# Patient Record
Sex: FEMALE | Race: WHITE | NOT HISPANIC OR LATINO | Employment: FULL TIME | RURAL
[De-identification: names, ages, dates, MRNs, and addresses within clinical notes are randomized per-mention and may not be internally consistent; named-entity substitution may affect disease eponyms.]

---

## 2020-06-15 ENCOUNTER — HISTORICAL (OUTPATIENT)
Dept: ADMINISTRATIVE | Facility: HOSPITAL | Age: 45
End: 2020-06-15

## 2020-06-18 LAB
LAB AP CLINICAL INFORMATION: NORMAL
LAB AP GENERAL CAT - HISTORICAL: NORMAL
LAB AP INTERPRETATION/RESULT - HISTORICAL: NEGATIVE
LAB AP SPECIMEN ADEQUACY - HISTORICAL: NORMAL
LAB AP SPECIMEN SUBMITTED - HISTORICAL: NORMAL

## 2020-11-20 ENCOUNTER — HISTORICAL (OUTPATIENT)
Dept: ADMINISTRATIVE | Facility: HOSPITAL | Age: 45
End: 2020-11-20

## 2021-03-14 VITALS — BODY MASS INDEX: 45.54 KG/M2 | WEIGHT: 257 LBS | HEIGHT: 63 IN

## 2021-03-14 RX ORDER — ATORVASTATIN CALCIUM 20 MG/1
20 TABLET, FILM COATED ORAL DAILY
COMMUNITY
End: 2021-11-05 | Stop reason: SDUPTHER

## 2021-03-14 RX ORDER — OMEPRAZOLE 40 MG/1
40 CAPSULE, DELAYED RELEASE ORAL DAILY
COMMUNITY
End: 2021-10-25

## 2021-03-14 RX ORDER — PAROXETINE HYDROCHLORIDE 20 MG/1
20 TABLET, FILM COATED ORAL NIGHTLY
COMMUNITY
End: 2022-05-11 | Stop reason: SDUPTHER

## 2021-03-14 RX ORDER — HYDROCODONE BITARTRATE AND ACETAMINOPHEN 10; 325 MG/1; MG/1
1 TABLET ORAL 2 TIMES DAILY
COMMUNITY

## 2021-03-14 RX ORDER — ALBUTEROL SULFATE 90 UG/1
2 AEROSOL, METERED RESPIRATORY (INHALATION) EVERY 6 HOURS PRN
COMMUNITY
End: 2021-04-05 | Stop reason: SDUPTHER

## 2021-03-14 RX ORDER — SUCRALFATE 1 G/1
1 TABLET ORAL 2 TIMES DAILY
COMMUNITY
End: 2022-08-05 | Stop reason: SDUPTHER

## 2021-03-14 RX ORDER — CYCLOBENZAPRINE HCL 10 MG
10 TABLET ORAL 3 TIMES DAILY PRN
COMMUNITY

## 2021-03-14 RX ORDER — MINERAL OIL
180 ENEMA (ML) RECTAL DAILY
COMMUNITY
End: 2022-11-14 | Stop reason: SDUPTHER

## 2021-03-14 RX ORDER — BUDESONIDE AND FORMOTEROL FUMARATE DIHYDRATE 160; 4.5 UG/1; UG/1
2 AEROSOL RESPIRATORY (INHALATION) EVERY 12 HOURS
COMMUNITY
End: 2023-04-06 | Stop reason: SDUPTHER

## 2021-03-14 RX ORDER — ONDANSETRON 4 MG/1
8 TABLET, FILM COATED ORAL 3 TIMES DAILY PRN
COMMUNITY
End: 2022-01-28 | Stop reason: ALTCHOICE

## 2021-03-14 RX ORDER — LISINOPRIL 5 MG/1
20 TABLET ORAL DAILY
COMMUNITY
End: 2021-10-11

## 2021-03-14 RX ORDER — POTASSIUM CHLORIDE 750 MG/1
20 CAPSULE, EXTENDED RELEASE ORAL 2 TIMES DAILY
COMMUNITY
End: 2023-03-03

## 2021-03-14 RX ORDER — HYDROCHLOROTHIAZIDE 25 MG/1
12.5 TABLET ORAL DAILY
COMMUNITY
End: 2021-09-10

## 2021-04-05 ENCOUNTER — OFFICE VISIT (OUTPATIENT)
Dept: PULMONOLOGY | Facility: CLINIC | Age: 46
End: 2021-04-05
Payer: COMMERCIAL

## 2021-04-05 VITALS
HEART RATE: 84 BPM | DIASTOLIC BLOOD PRESSURE: 90 MMHG | RESPIRATION RATE: 24 BRPM | OXYGEN SATURATION: 95 % | SYSTOLIC BLOOD PRESSURE: 120 MMHG | WEIGHT: 257 LBS | HEIGHT: 63 IN | BODY MASS INDEX: 45.54 KG/M2

## 2021-04-05 DIAGNOSIS — J45.30 MILD PERSISTENT ASTHMA WITHOUT COMPLICATION: ICD-10-CM

## 2021-04-05 DIAGNOSIS — G47.33 OSA (OBSTRUCTIVE SLEEP APNEA): ICD-10-CM

## 2021-04-05 DIAGNOSIS — E66.09 OBESITY DUE TO EXCESS CALORIES WITH SERIOUS COMORBIDITY, UNSPECIFIED CLASSIFICATION: ICD-10-CM

## 2021-04-05 PROCEDURE — 99215 OFFICE O/P EST HI 40 MIN: CPT | Mod: PBBFAC | Performed by: INTERNAL MEDICINE

## 2021-04-05 PROCEDURE — 99213 OFFICE O/P EST LOW 20 MIN: CPT | Mod: S$PBB,,, | Performed by: INTERNAL MEDICINE

## 2021-04-05 PROCEDURE — 99999 PR PBB SHADOW E&M-EST. PATIENT-LVL V: ICD-10-PCS | Mod: PBBFAC,,, | Performed by: INTERNAL MEDICINE

## 2021-04-05 PROCEDURE — 99213 PR OFFICE/OUTPT VISIT, EST, LEVL III, 20-29 MIN: ICD-10-PCS | Mod: S$PBB,,, | Performed by: INTERNAL MEDICINE

## 2021-04-05 PROCEDURE — 99999 PR PBB SHADOW E&M-EST. PATIENT-LVL V: CPT | Mod: PBBFAC,,, | Performed by: INTERNAL MEDICINE

## 2021-04-05 RX ORDER — ALBUTEROL SULFATE 90 UG/1
2 AEROSOL, METERED RESPIRATORY (INHALATION) EVERY 6 HOURS PRN
Qty: 18 G | Refills: 5 | Status: SHIPPED | OUTPATIENT
Start: 2021-04-05 | End: 2023-04-06 | Stop reason: SDUPTHER

## 2021-04-05 RX ORDER — FAMOTIDINE 40 MG/1
40 TABLET, FILM COATED ORAL 2 TIMES DAILY
COMMUNITY
End: 2023-03-08 | Stop reason: ALTCHOICE

## 2021-07-13 ENCOUNTER — OFFICE VISIT (OUTPATIENT)
Dept: FAMILY MEDICINE | Facility: CLINIC | Age: 46
End: 2021-07-13
Payer: COMMERCIAL

## 2021-07-13 VITALS
RESPIRATION RATE: 18 BRPM | WEIGHT: 261 LBS | BODY MASS INDEX: 46.25 KG/M2 | DIASTOLIC BLOOD PRESSURE: 71 MMHG | HEIGHT: 63 IN | HEART RATE: 81 BPM | OXYGEN SATURATION: 95 % | SYSTOLIC BLOOD PRESSURE: 139 MMHG

## 2021-07-13 DIAGNOSIS — B07.0 PLANTAR WART OF RIGHT FOOT: ICD-10-CM

## 2021-07-13 DIAGNOSIS — G47.33 OSA (OBSTRUCTIVE SLEEP APNEA): ICD-10-CM

## 2021-07-13 DIAGNOSIS — I10 ESSENTIAL HYPERTENSION: ICD-10-CM

## 2021-07-13 DIAGNOSIS — R60.0 BILATERAL LOWER EXTREMITY EDEMA: Primary | ICD-10-CM

## 2021-07-13 DIAGNOSIS — K21.9 GASTROESOPHAGEAL REFLUX DISEASE WITHOUT ESOPHAGITIS: ICD-10-CM

## 2021-07-13 DIAGNOSIS — J45.30 MILD PERSISTENT ASTHMA WITHOUT COMPLICATION: ICD-10-CM

## 2021-07-13 DIAGNOSIS — R73.03 PRE-DIABETES: ICD-10-CM

## 2021-07-13 DIAGNOSIS — E66.09 OBESITY DUE TO EXCESS CALORIES WITH SERIOUS COMORBIDITY, UNSPECIFIED CLASSIFICATION: ICD-10-CM

## 2021-07-13 LAB
ALBUMIN SERPL BCP-MCNC: 3.5 G/DL (ref 3.5–5)
ALBUMIN/GLOB SERPL: 0.9 {RATIO}
ALP SERPL-CCNC: 131 U/L (ref 37–98)
ALT SERPL W P-5'-P-CCNC: 53 U/L (ref 13–56)
ANION GAP SERPL CALCULATED.3IONS-SCNC: 12 MMOL/L (ref 7–16)
AST SERPL W P-5'-P-CCNC: 49 U/L (ref 15–37)
BASOPHILS # BLD AUTO: 0.1 K/UL (ref 0–0.2)
BASOPHILS NFR BLD AUTO: 1.2 % (ref 0–1)
BILIRUB SERPL-MCNC: 0.4 MG/DL (ref 0–1.2)
BUN SERPL-MCNC: 11 MG/DL (ref 7–18)
BUN/CREAT SERPL: 13 (ref 6–20)
CALCIUM SERPL-MCNC: 9.4 MG/DL (ref 8.5–10.1)
CHLORIDE SERPL-SCNC: 102 MMOL/L (ref 98–107)
CHOLEST SERPL-MCNC: 138 MG/DL (ref 0–200)
CHOLEST/HDLC SERPL: 2.3 {RATIO}
CO2 SERPL-SCNC: 30 MMOL/L (ref 21–32)
CREAT SERPL-MCNC: 0.87 MG/DL (ref 0.55–1.02)
CREAT UR-MCNC: 289 MG/DL (ref 28–219)
DIFFERENTIAL METHOD BLD: ABNORMAL
EOSINOPHIL # BLD AUTO: 0.23 K/UL (ref 0–0.5)
EOSINOPHIL NFR BLD AUTO: 2.8 % (ref 1–4)
ERYTHROCYTE [DISTWIDTH] IN BLOOD BY AUTOMATED COUNT: 13 % (ref 11.5–14.5)
EST. AVERAGE GLUCOSE BLD GHB EST-MCNC: 84 MG/DL
GLOBULIN SER-MCNC: 4 G/DL (ref 2–4)
GLUCOSE SERPL-MCNC: 112 MG/DL (ref 74–106)
HBA1C MFR BLD HPLC: 5.1 % (ref 4.5–6.6)
HCT VFR BLD AUTO: 44.3 % (ref 38–47)
HDLC SERPL-MCNC: 60 MG/DL (ref 40–60)
HGB BLD-MCNC: 15.2 G/DL (ref 12–16)
IMM GRANULOCYTES # BLD AUTO: 0.02 K/UL (ref 0–0.04)
IMM GRANULOCYTES NFR BLD: 0.2 % (ref 0–0.4)
LDLC SERPL CALC-MCNC: 55 MG/DL
LDLC/HDLC SERPL: 0.9 {RATIO}
LYMPHOCYTES # BLD AUTO: 2.46 K/UL (ref 1–4.8)
LYMPHOCYTES NFR BLD AUTO: 30.1 % (ref 27–41)
MCH RBC QN AUTO: 32.1 PG (ref 27–31)
MCHC RBC AUTO-ENTMCNC: 34.3 G/DL (ref 32–36)
MCV RBC AUTO: 93.5 FL (ref 80–96)
MICROALBUMIN UR-MCNC: 4.1 MG/DL (ref 0–2.8)
MICROALBUMIN/CREAT RATIO PNL UR: 14.2 MG/G (ref 0–30)
MONOCYTES # BLD AUTO: 0.86 K/UL (ref 0–0.8)
MONOCYTES NFR BLD AUTO: 10.5 % (ref 2–6)
MPC BLD CALC-MCNC: 12 FL (ref 9.4–12.4)
NEUTROPHILS # BLD AUTO: 4.5 K/UL (ref 1.8–7.7)
NEUTROPHILS NFR BLD AUTO: 55.2 % (ref 53–65)
NONHDLC SERPL-MCNC: 78 MG/DL
NRBC # BLD AUTO: 0 X10E3/UL
NRBC, AUTO (.00): 0 %
PLATELET # BLD AUTO: 193 K/UL (ref 150–400)
POTASSIUM SERPL-SCNC: 3.5 MMOL/L (ref 3.5–5.1)
PROT SERPL-MCNC: 7.5 G/DL (ref 6.4–8.2)
RBC # BLD AUTO: 4.74 M/UL (ref 4.2–5.4)
SODIUM SERPL-SCNC: 140 MMOL/L (ref 136–145)
TRIGL SERPL-MCNC: 114 MG/DL (ref 35–150)
VLDLC SERPL-MCNC: 23 MG/DL
WBC # BLD AUTO: 8.17 K/UL (ref 4.5–11)

## 2021-07-13 PROCEDURE — 82570 ASSAY OF URINE CREATININE: CPT | Mod: ,,, | Performed by: CLINICAL MEDICAL LABORATORY

## 2021-07-13 PROCEDURE — 99214 PR OFFICE/OUTPT VISIT, EST, LEVL IV, 30-39 MIN: ICD-10-PCS | Mod: ,,, | Performed by: NURSE PRACTITIONER

## 2021-07-13 PROCEDURE — 83036 HEMOGLOBIN GLYCOSYLATED A1C: CPT | Mod: ,,, | Performed by: CLINICAL MEDICAL LABORATORY

## 2021-07-13 PROCEDURE — 80053 COMPREHEN METABOLIC PANEL: CPT | Mod: ,,, | Performed by: CLINICAL MEDICAL LABORATORY

## 2021-07-13 PROCEDURE — 80061 LIPID PANEL: CPT | Mod: ,,, | Performed by: CLINICAL MEDICAL LABORATORY

## 2021-07-13 PROCEDURE — 83036 HEMOGLOBIN A1C: ICD-10-PCS | Mod: ,,, | Performed by: CLINICAL MEDICAL LABORATORY

## 2021-07-13 PROCEDURE — 99214 OFFICE O/P EST MOD 30 MIN: CPT | Mod: ,,, | Performed by: NURSE PRACTITIONER

## 2021-07-13 PROCEDURE — 80053 COMPREHENSIVE METABOLIC PANEL: ICD-10-PCS | Mod: ,,, | Performed by: CLINICAL MEDICAL LABORATORY

## 2021-07-13 PROCEDURE — 85025 CBC WITH DIFFERENTIAL: ICD-10-PCS | Mod: ,,, | Performed by: CLINICAL MEDICAL LABORATORY

## 2021-07-13 PROCEDURE — 82043 UR ALBUMIN QUANTITATIVE: CPT | Mod: ,,, | Performed by: CLINICAL MEDICAL LABORATORY

## 2021-07-13 PROCEDURE — 80061 LIPID PANEL: ICD-10-PCS | Mod: ,,, | Performed by: CLINICAL MEDICAL LABORATORY

## 2021-07-13 PROCEDURE — 85025 COMPLETE CBC W/AUTO DIFF WBC: CPT | Mod: ,,, | Performed by: CLINICAL MEDICAL LABORATORY

## 2021-07-13 PROCEDURE — 82570 MICROALBUMIN / CREATININE RATIO URINE: ICD-10-PCS | Mod: ,,, | Performed by: CLINICAL MEDICAL LABORATORY

## 2021-07-13 PROCEDURE — 82043 MICROALBUMIN / CREATININE RATIO URINE: ICD-10-PCS | Mod: ,,, | Performed by: CLINICAL MEDICAL LABORATORY

## 2021-07-14 ENCOUNTER — TELEPHONE (OUTPATIENT)
Dept: FAMILY MEDICINE | Facility: CLINIC | Age: 46
End: 2021-07-14

## 2021-07-26 ENCOUNTER — OFFICE VISIT (OUTPATIENT)
Dept: SLEEP MEDICINE | Facility: CLINIC | Age: 46
End: 2021-07-26
Payer: COMMERCIAL

## 2021-07-26 VITALS
OXYGEN SATURATION: 94 % | BODY MASS INDEX: 47.27 KG/M2 | SYSTOLIC BLOOD PRESSURE: 150 MMHG | HEART RATE: 82 BPM | HEIGHT: 63 IN | WEIGHT: 266.81 LBS | DIASTOLIC BLOOD PRESSURE: 99 MMHG

## 2021-07-26 DIAGNOSIS — E66.09 OBESITY DUE TO EXCESS CALORIES WITH SERIOUS COMORBIDITY, UNSPECIFIED CLASSIFICATION: ICD-10-CM

## 2021-07-26 DIAGNOSIS — I10 ESSENTIAL HYPERTENSION: ICD-10-CM

## 2021-07-26 DIAGNOSIS — G47.33 OSA ON CPAP: Primary | ICD-10-CM

## 2021-07-26 PROCEDURE — 99212 PR OFFICE/OUTPT VISIT, EST, LEVL II, 10-19 MIN: ICD-10-PCS | Mod: S$PBB,,, | Performed by: FAMILY MEDICINE

## 2021-07-26 PROCEDURE — 99214 OFFICE O/P EST MOD 30 MIN: CPT | Mod: PBBFAC | Performed by: FAMILY MEDICINE

## 2021-07-26 PROCEDURE — 99212 OFFICE O/P EST SF 10 MIN: CPT | Mod: S$PBB,,, | Performed by: FAMILY MEDICINE

## 2021-08-17 ENCOUNTER — INFUSION (OUTPATIENT)
Dept: INFECTIOUS DISEASES | Facility: HOSPITAL | Age: 46
End: 2021-08-17
Attending: NURSE PRACTITIONER
Payer: COMMERCIAL

## 2021-08-17 ENCOUNTER — OFFICE VISIT (OUTPATIENT)
Dept: FAMILY MEDICINE | Facility: CLINIC | Age: 46
End: 2021-08-17
Payer: COMMERCIAL

## 2021-08-17 VITALS
OXYGEN SATURATION: 96 % | WEIGHT: 260 LBS | RESPIRATION RATE: 22 BRPM | HEART RATE: 81 BPM | BODY MASS INDEX: 46.07 KG/M2 | TEMPERATURE: 98 F | SYSTOLIC BLOOD PRESSURE: 158 MMHG | HEIGHT: 63 IN | DIASTOLIC BLOOD PRESSURE: 93 MMHG

## 2021-08-17 VITALS
BODY MASS INDEX: 46.07 KG/M2 | SYSTOLIC BLOOD PRESSURE: 138 MMHG | OXYGEN SATURATION: 91 % | WEIGHT: 260 LBS | DIASTOLIC BLOOD PRESSURE: 73 MMHG | RESPIRATION RATE: 18 BRPM | HEIGHT: 63 IN | HEART RATE: 81 BPM

## 2021-08-17 DIAGNOSIS — U07.1 COVID-19: Primary | ICD-10-CM

## 2021-08-17 DIAGNOSIS — Z20.822 ENCOUNTER FOR LABORATORY TESTING FOR COVID-19 VIRUS: ICD-10-CM

## 2021-08-17 LAB
CTP QC/QA: YES
FLUAV AG NPH QL: NEGATIVE
FLUBV AG NPH QL: NEGATIVE
SARS-COV-2 AG RESP QL IA.RAPID: POSITIVE

## 2021-08-17 PROCEDURE — 87428 SARSCOV & INF VIR A&B AG IA: CPT | Mod: QW,,, | Performed by: NURSE PRACTITIONER

## 2021-08-17 PROCEDURE — 87428 POCT SARS-COV2 (COVID) WITH FLU ANTIGEN: ICD-10-PCS | Mod: QW,,, | Performed by: NURSE PRACTITIONER

## 2021-08-17 PROCEDURE — 99214 OFFICE O/P EST MOD 30 MIN: CPT | Mod: ,,, | Performed by: NURSE PRACTITIONER

## 2021-08-17 PROCEDURE — 99214 PR OFFICE/OUTPT VISIT, EST, LEVL IV, 30-39 MIN: ICD-10-PCS | Mod: ,,, | Performed by: NURSE PRACTITIONER

## 2021-08-17 PROCEDURE — 63600175 PHARM REV CODE 636 W HCPCS: Performed by: NURSE PRACTITIONER

## 2021-08-17 PROCEDURE — M0243 CASIRIVI AND IMDEVI INFUSION: HCPCS | Performed by: NURSE PRACTITIONER

## 2021-08-17 PROCEDURE — 25000003 PHARM REV CODE 250: Performed by: NURSE PRACTITIONER

## 2021-08-17 RX ORDER — ONDANSETRON 4 MG/1
4 TABLET, ORALLY DISINTEGRATING ORAL ONCE AS NEEDED
Status: DISCONTINUED | OUTPATIENT
Start: 2021-08-17 | End: 2022-01-28 | Stop reason: ALTCHOICE

## 2021-08-17 RX ORDER — ALBUTEROL SULFATE 90 UG/1
2 AEROSOL, METERED RESPIRATORY (INHALATION)
Status: DISCONTINUED | OUTPATIENT
Start: 2021-08-17 | End: 2022-11-14

## 2021-08-17 RX ORDER — ACETAMINOPHEN 325 MG/1
650 TABLET ORAL ONCE AS NEEDED
Status: DISCONTINUED | OUTPATIENT
Start: 2021-08-17 | End: 2022-01-28 | Stop reason: ALTCHOICE

## 2021-08-17 RX ORDER — SODIUM CHLORIDE 0.9 % (FLUSH) 0.9 %
10 SYRINGE (ML) INJECTION
Status: DISCONTINUED | OUTPATIENT
Start: 2021-08-17 | End: 2023-08-16

## 2021-08-17 RX ORDER — DIPHENHYDRAMINE HYDROCHLORIDE 50 MG/ML
25 INJECTION INTRAMUSCULAR; INTRAVENOUS ONCE AS NEEDED
Status: DISCONTINUED | OUTPATIENT
Start: 2021-08-17 | End: 2022-01-28 | Stop reason: ALTCHOICE

## 2021-08-17 RX ORDER — EPINEPHRINE 0.3 MG/.3ML
0.3 INJECTION SUBCUTANEOUS
Status: DISCONTINUED | OUTPATIENT
Start: 2021-08-17 | End: 2022-01-28 | Stop reason: ALTCHOICE

## 2021-08-17 RX ADMIN — CASIRIVIMAB AND IMDEVIMAB 600 MG: 600; 600 INJECTION, SOLUTION, CONCENTRATE INTRAVENOUS at 11:08

## 2021-09-10 ENCOUNTER — OFFICE VISIT (OUTPATIENT)
Dept: FAMILY MEDICINE | Facility: CLINIC | Age: 46
End: 2021-09-10
Payer: COMMERCIAL

## 2021-09-10 VITALS
HEART RATE: 79 BPM | RESPIRATION RATE: 18 BRPM | DIASTOLIC BLOOD PRESSURE: 62 MMHG | WEIGHT: 266 LBS | BODY MASS INDEX: 47.13 KG/M2 | OXYGEN SATURATION: 98 % | SYSTOLIC BLOOD PRESSURE: 146 MMHG | HEIGHT: 63 IN

## 2021-09-10 DIAGNOSIS — I73.9 PERIPHERAL VASCULAR DISEASE: ICD-10-CM

## 2021-09-10 DIAGNOSIS — I10 ESSENTIAL HYPERTENSION: Primary | ICD-10-CM

## 2021-09-10 PROCEDURE — 99214 PR OFFICE/OUTPT VISIT, EST, LEVL IV, 30-39 MIN: ICD-10-PCS | Mod: ,,, | Performed by: NURSE PRACTITIONER

## 2021-09-10 PROCEDURE — 99214 OFFICE O/P EST MOD 30 MIN: CPT | Mod: ,,, | Performed by: NURSE PRACTITIONER

## 2021-09-13 DIAGNOSIS — R60.0 LOCALIZED EDEMA: Primary | ICD-10-CM

## 2021-09-13 RX ORDER — FUROSEMIDE 20 MG/1
TABLET ORAL
Qty: 60 TABLET | Refills: 11 | Status: SHIPPED | OUTPATIENT
Start: 2021-09-13 | End: 2024-02-27

## 2021-10-11 ENCOUNTER — OFFICE VISIT (OUTPATIENT)
Dept: FAMILY MEDICINE | Facility: CLINIC | Age: 46
End: 2021-10-11
Payer: COMMERCIAL

## 2021-10-11 VITALS
HEART RATE: 90 BPM | RESPIRATION RATE: 18 BRPM | HEIGHT: 63 IN | OXYGEN SATURATION: 98 % | WEIGHT: 264 LBS | DIASTOLIC BLOOD PRESSURE: 88 MMHG | SYSTOLIC BLOOD PRESSURE: 128 MMHG | BODY MASS INDEX: 46.78 KG/M2

## 2021-10-11 DIAGNOSIS — Z23 NEED FOR INFLUENZA VACCINATION: ICD-10-CM

## 2021-10-11 DIAGNOSIS — I10 PRIMARY HYPERTENSION: Primary | ICD-10-CM

## 2021-10-11 DIAGNOSIS — I73.9 PERIPHERAL VASCULAR DISEASE: ICD-10-CM

## 2021-10-11 PROCEDURE — 99213 PR OFFICE/OUTPT VISIT, EST, LEVL III, 20-29 MIN: ICD-10-PCS | Mod: 25,,, | Performed by: NURSE PRACTITIONER

## 2021-10-11 PROCEDURE — 90686 FLU VACCINE (QUAD) GREATER THAN OR EQUAL TO 3YO PRESERVATIVE FREE IM: ICD-10-PCS | Mod: ,,, | Performed by: NURSE PRACTITIONER

## 2021-10-11 PROCEDURE — 90686 IIV4 VACC NO PRSV 0.5 ML IM: CPT | Mod: ,,, | Performed by: NURSE PRACTITIONER

## 2021-10-11 PROCEDURE — 99213 OFFICE O/P EST LOW 20 MIN: CPT | Mod: 25,,, | Performed by: NURSE PRACTITIONER

## 2021-10-11 PROCEDURE — 90471 IMMUNIZATION ADMIN: CPT | Mod: ,,, | Performed by: NURSE PRACTITIONER

## 2021-10-11 PROCEDURE — 90471 FLU VACCINE (QUAD) GREATER THAN OR EQUAL TO 3YO PRESERVATIVE FREE IM: ICD-10-PCS | Mod: ,,, | Performed by: NURSE PRACTITIONER

## 2021-10-11 RX ORDER — LISINOPRIL 20 MG/1
20 TABLET ORAL DAILY
Qty: 90 TABLET | Refills: 3 | Status: SHIPPED | OUTPATIENT
Start: 2021-10-11 | End: 2023-07-12 | Stop reason: SDUPTHER

## 2021-10-11 RX ORDER — LISINOPRIL 5 MG/1
20 TABLET ORAL DAILY
Qty: 30 TABLET | Refills: 2 | Status: CANCELLED | OUTPATIENT
Start: 2021-10-11

## 2021-10-25 ENCOUNTER — OFFICE VISIT (OUTPATIENT)
Dept: SLEEP MEDICINE | Facility: CLINIC | Age: 46
End: 2021-10-25
Attending: FAMILY MEDICINE
Payer: COMMERCIAL

## 2021-10-25 VITALS
SYSTOLIC BLOOD PRESSURE: 159 MMHG | DIASTOLIC BLOOD PRESSURE: 93 MMHG | WEIGHT: 261 LBS | BODY MASS INDEX: 46.25 KG/M2 | OXYGEN SATURATION: 96 % | HEART RATE: 75 BPM | HEIGHT: 63 IN

## 2021-10-25 DIAGNOSIS — G47.33 OSA ON CPAP: Primary | ICD-10-CM

## 2021-10-25 PROCEDURE — 99212 PR OFFICE/OUTPT VISIT, EST, LEVL II, 10-19 MIN: ICD-10-PCS | Mod: S$PBB,,, | Performed by: FAMILY MEDICINE

## 2021-10-25 PROCEDURE — 99212 OFFICE O/P EST SF 10 MIN: CPT | Mod: S$PBB,,, | Performed by: FAMILY MEDICINE

## 2021-10-25 PROCEDURE — 99215 OFFICE O/P EST HI 40 MIN: CPT | Mod: PBBFAC | Performed by: FAMILY MEDICINE

## 2021-11-03 DIAGNOSIS — Z12.31 SCREENING MAMMOGRAM FOR BREAST CANCER: Primary | ICD-10-CM

## 2021-11-05 ENCOUNTER — OFFICE VISIT (OUTPATIENT)
Dept: FAMILY MEDICINE | Facility: CLINIC | Age: 46
End: 2021-11-05
Payer: COMMERCIAL

## 2021-11-05 VITALS
WEIGHT: 259 LBS | SYSTOLIC BLOOD PRESSURE: 145 MMHG | OXYGEN SATURATION: 98 % | HEIGHT: 63 IN | HEART RATE: 81 BPM | BODY MASS INDEX: 45.89 KG/M2 | RESPIRATION RATE: 18 BRPM | DIASTOLIC BLOOD PRESSURE: 81 MMHG

## 2021-11-05 DIAGNOSIS — E66.01 CLASS 3 SEVERE OBESITY DUE TO EXCESS CALORIES WITH SERIOUS COMORBIDITY AND BODY MASS INDEX (BMI) OF 45.0 TO 49.9 IN ADULT: ICD-10-CM

## 2021-11-05 DIAGNOSIS — N76.0 ACUTE VAGINITIS: ICD-10-CM

## 2021-11-05 DIAGNOSIS — G47.33 OSA ON CPAP: ICD-10-CM

## 2021-11-05 DIAGNOSIS — Z13.1 SCREENING FOR DIABETES MELLITUS: ICD-10-CM

## 2021-11-05 DIAGNOSIS — Z12.72 SCREENING FOR VAGINAL CANCER: Primary | ICD-10-CM

## 2021-11-05 DIAGNOSIS — Z13.220 SCREENING FOR LIPOID DISORDERS: ICD-10-CM

## 2021-11-05 LAB
CHOLEST SERPL-MCNC: 146 MG/DL (ref 0–200)
CHOLEST/HDLC SERPL: 2.1 {RATIO}
EST. AVERAGE GLUCOSE BLD GHB EST-MCNC: 104 MG/DL
GLUCOSE SERPL-MCNC: 160 MG/DL (ref 74–106)
HBA1C MFR BLD HPLC: 5.7 % (ref 4.5–6.6)
HDLC SERPL-MCNC: 70 MG/DL (ref 40–60)
LDLC SERPL CALC-MCNC: 52 MG/DL
LDLC/HDLC SERPL: 0.7 {RATIO}
NONHDLC SERPL-MCNC: 76 MG/DL
TRIGL SERPL-MCNC: 121 MG/DL (ref 35–150)
VLDLC SERPL-MCNC: 24 MG/DL

## 2021-11-05 PROCEDURE — 88142 CYTOPATH C/V THIN LAYER: CPT | Mod: GCY | Performed by: NURSE PRACTITIONER

## 2021-11-05 PROCEDURE — 80061 LIPID PANEL: CPT | Mod: GZ,,, | Performed by: CLINICAL MEDICAL LABORATORY

## 2021-11-05 PROCEDURE — 82947 GLUCOSE, FASTING: ICD-10-PCS | Mod: ,,, | Performed by: CLINICAL MEDICAL LABORATORY

## 2021-11-05 PROCEDURE — 87624 HUMAN PAPILLOMAVIRUS (HPV): ICD-10-PCS | Mod: ,,, | Performed by: CLINICAL MEDICAL LABORATORY

## 2021-11-05 PROCEDURE — 99396 PR PREVENTIVE VISIT,EST,40-64: ICD-10-PCS | Mod: ,,, | Performed by: NURSE PRACTITIONER

## 2021-11-05 PROCEDURE — 82947 ASSAY GLUCOSE BLOOD QUANT: CPT | Mod: ,,, | Performed by: CLINICAL MEDICAL LABORATORY

## 2021-11-05 PROCEDURE — 83036 HEMOGLOBIN A1C: ICD-10-PCS | Mod: GZ,,, | Performed by: CLINICAL MEDICAL LABORATORY

## 2021-11-05 PROCEDURE — 83036 HEMOGLOBIN GLYCOSYLATED A1C: CPT | Mod: GZ,,, | Performed by: CLINICAL MEDICAL LABORATORY

## 2021-11-05 PROCEDURE — 80061 LIPID PANEL: ICD-10-PCS | Mod: GZ,,, | Performed by: CLINICAL MEDICAL LABORATORY

## 2021-11-05 PROCEDURE — 87624 HPV HI-RISK TYP POOLED RSLT: CPT | Mod: ,,, | Performed by: CLINICAL MEDICAL LABORATORY

## 2021-11-05 PROCEDURE — 99396 PREV VISIT EST AGE 40-64: CPT | Mod: ,,, | Performed by: NURSE PRACTITIONER

## 2021-11-05 RX ORDER — FLUCONAZOLE 150 MG/1
150 TABLET ORAL DAILY
Qty: 1 TABLET | Refills: 0 | Status: SHIPPED | OUTPATIENT
Start: 2021-11-05 | End: 2021-11-06

## 2021-11-05 RX ORDER — ATORVASTATIN CALCIUM 20 MG/1
20 TABLET, FILM COATED ORAL DAILY
Qty: 90 TABLET | Refills: 3 | Status: SHIPPED | OUTPATIENT
Start: 2021-11-05 | End: 2022-11-14 | Stop reason: CLARIF

## 2021-11-08 DIAGNOSIS — R73.03 PREDIABETES: ICD-10-CM

## 2021-11-08 DIAGNOSIS — E66.01 CLASS 3 SEVERE OBESITY DUE TO EXCESS CALORIES WITH SERIOUS COMORBIDITY AND BODY MASS INDEX (BMI) OF 40.0 TO 44.9 IN ADULT: Primary | ICD-10-CM

## 2021-11-08 RX ORDER — SEMAGLUTIDE 1.34 MG/ML
0.25 INJECTION, SOLUTION SUBCUTANEOUS
COMMUNITY
End: 2021-11-08 | Stop reason: SDUPTHER

## 2021-11-08 RX ORDER — SEMAGLUTIDE 1.34 MG/ML
0.25 INJECTION, SOLUTION SUBCUTANEOUS
Qty: 1 PEN | Refills: 0 | Status: SHIPPED | OUTPATIENT
Start: 2021-11-08 | End: 2021-12-07 | Stop reason: CLARIF

## 2021-11-10 LAB
GH SERPL-MCNC: NORMAL NG/ML
INSULIN SERPL-ACNC: NORMAL U[IU]/ML
LAB AP CLINICAL INFORMATION: NORMAL
LAB AP GYN INTERPRETATION: NEGATIVE
LAB AP PAP DISCLAIMER COMMENTS: NORMAL
RENIN PLAS-CCNC: NORMAL NG/ML/H

## 2021-11-12 LAB
HPV 16: NEGATIVE
HPV 18: NEGATIVE
HPV OTHER: NEGATIVE

## 2021-12-07 ENCOUNTER — OFFICE VISIT (OUTPATIENT)
Dept: FAMILY MEDICINE | Facility: CLINIC | Age: 46
End: 2021-12-07
Payer: COMMERCIAL

## 2021-12-07 VITALS
DIASTOLIC BLOOD PRESSURE: 69 MMHG | BODY MASS INDEX: 46.07 KG/M2 | SYSTOLIC BLOOD PRESSURE: 131 MMHG | WEIGHT: 260 LBS | RESPIRATION RATE: 18 BRPM | OXYGEN SATURATION: 98 % | HEART RATE: 87 BPM | HEIGHT: 63 IN

## 2021-12-07 DIAGNOSIS — R30.0 DYSURIA: Primary | ICD-10-CM

## 2021-12-07 LAB
BILIRUB SERPL-MCNC: NORMAL MG/DL
BLOOD URINE, POC: NORMAL
COLOR, POC UA: NORMAL
GLUCOSE UR QL STRIP: NORMAL
KETONES UR QL STRIP: NORMAL
LEUKOCYTE ESTERASE URINE, POC: NORMAL
NITRITE, POC UA: NORMAL
PH, POC UA: 6.5
PROTEIN, POC: NORMAL
SPECIFIC GRAVITY, POC UA: 1.01
UROBILINOGEN, POC UA: 1

## 2021-12-07 PROCEDURE — 81003 URINALYSIS AUTO W/O SCOPE: CPT | Mod: QW,,, | Performed by: NURSE PRACTITIONER

## 2021-12-07 PROCEDURE — 87086 CULTURE, URINE: ICD-10-PCS | Mod: ,,, | Performed by: CLINICAL MEDICAL LABORATORY

## 2021-12-07 PROCEDURE — 4010F PR ACE/ARB THEARPY RXD/TAKEN: ICD-10-PCS | Mod: CPTII,,, | Performed by: NURSE PRACTITIONER

## 2021-12-07 PROCEDURE — 3066F PR DOCUMENTATION OF TREATMENT FOR NEPHROPATHY: ICD-10-PCS | Mod: CPTII,,, | Performed by: NURSE PRACTITIONER

## 2021-12-07 PROCEDURE — 3061F PR NEG MICROALBUMINURIA RESULT DOCUMENTED/REVIEW: ICD-10-PCS | Mod: CPTII,,, | Performed by: NURSE PRACTITIONER

## 2021-12-07 PROCEDURE — 87086 URINE CULTURE/COLONY COUNT: CPT | Mod: ,,, | Performed by: CLINICAL MEDICAL LABORATORY

## 2021-12-07 PROCEDURE — 81003 POCT URINALYSIS W/O SCOPE: ICD-10-PCS | Mod: QW,,, | Performed by: NURSE PRACTITIONER

## 2021-12-07 PROCEDURE — 99213 PR OFFICE/OUTPT VISIT, EST, LEVL III, 20-29 MIN: ICD-10-PCS | Mod: ,,, | Performed by: NURSE PRACTITIONER

## 2021-12-07 PROCEDURE — 99213 OFFICE O/P EST LOW 20 MIN: CPT | Mod: ,,, | Performed by: NURSE PRACTITIONER

## 2021-12-07 PROCEDURE — 4010F ACE/ARB THERAPY RXD/TAKEN: CPT | Mod: CPTII,,, | Performed by: NURSE PRACTITIONER

## 2021-12-07 PROCEDURE — 3061F NEG MICROALBUMINURIA REV: CPT | Mod: CPTII,,, | Performed by: NURSE PRACTITIONER

## 2021-12-07 PROCEDURE — 3066F NEPHROPATHY DOC TX: CPT | Mod: CPTII,,, | Performed by: NURSE PRACTITIONER

## 2021-12-07 RX ORDER — PHENAZOPYRIDINE HYDROCHLORIDE 200 MG/1
200 TABLET, FILM COATED ORAL 3 TIMES DAILY PRN
Qty: 12 TABLET | Refills: 2 | Status: SHIPPED | OUTPATIENT
Start: 2021-12-07 | End: 2021-12-17

## 2021-12-09 LAB — UA COMPLETE W REFLEX CULTURE PNL UR: NORMAL

## 2021-12-14 ENCOUNTER — TELEPHONE (OUTPATIENT)
Dept: FAMILY MEDICINE | Facility: CLINIC | Age: 46
End: 2021-12-14
Payer: COMMERCIAL

## 2021-12-14 ENCOUNTER — HOSPITAL ENCOUNTER (OUTPATIENT)
Dept: RADIOLOGY | Facility: HOSPITAL | Age: 46
Discharge: HOME | End: 2021-12-14
Attending: SURGERY
Payer: COMMERCIAL

## 2021-12-14 ENCOUNTER — OFFICE VISIT (OUTPATIENT)
Dept: SURGERY | Facility: CLINIC | Age: 46
End: 2021-12-14
Payer: COMMERCIAL

## 2021-12-14 DIAGNOSIS — E11.9 DIABETES MELLITUS WITHOUT COMPLICATION: Primary | ICD-10-CM

## 2021-12-14 DIAGNOSIS — E04.1 THYROID NODULE: ICD-10-CM

## 2021-12-14 DIAGNOSIS — E04.1 THYROID NODULE: Primary | ICD-10-CM

## 2021-12-14 PROCEDURE — 99214 OFFICE O/P EST MOD 30 MIN: CPT | Mod: PBBFAC | Performed by: SURGERY

## 2021-12-14 PROCEDURE — 3066F NEPHROPATHY DOC TX: CPT | Mod: CPTII,,, | Performed by: SURGERY

## 2021-12-14 PROCEDURE — 99214 OFFICE O/P EST MOD 30 MIN: CPT | Mod: S$PBB,,, | Performed by: SURGERY

## 2021-12-14 PROCEDURE — 76536 US THYROID: ICD-10-PCS | Mod: 26,,, | Performed by: RADIOLOGY

## 2021-12-14 PROCEDURE — 4010F PR ACE/ARB THEARPY RXD/TAKEN: ICD-10-PCS | Mod: CPTII,,, | Performed by: SURGERY

## 2021-12-14 PROCEDURE — 4010F ACE/ARB THERAPY RXD/TAKEN: CPT | Mod: CPTII,,, | Performed by: SURGERY

## 2021-12-14 PROCEDURE — 3061F PR NEG MICROALBUMINURIA RESULT DOCUMENTED/REVIEW: ICD-10-PCS | Mod: CPTII,,, | Performed by: SURGERY

## 2021-12-14 PROCEDURE — 99214 PR OFFICE/OUTPT VISIT, EST, LEVL IV, 30-39 MIN: ICD-10-PCS | Mod: S$PBB,,, | Performed by: SURGERY

## 2021-12-14 PROCEDURE — 76536 US EXAM OF HEAD AND NECK: CPT | Mod: 26,,, | Performed by: RADIOLOGY

## 2021-12-14 PROCEDURE — 76536 US EXAM OF HEAD AND NECK: CPT | Mod: TC

## 2021-12-14 PROCEDURE — 3061F NEG MICROALBUMINURIA REV: CPT | Mod: CPTII,,, | Performed by: SURGERY

## 2021-12-14 PROCEDURE — 3066F PR DOCUMENTATION OF TREATMENT FOR NEPHROPATHY: ICD-10-PCS | Mod: CPTII,,, | Performed by: SURGERY

## 2021-12-14 RX ORDER — METFORMIN HYDROCHLORIDE 500 MG/1
500 TABLET ORAL 2 TIMES DAILY WITH MEALS
Qty: 180 TABLET | Refills: 3 | Status: SHIPPED | OUTPATIENT
Start: 2021-12-14 | End: 2023-01-04

## 2022-01-28 ENCOUNTER — OFFICE VISIT (OUTPATIENT)
Dept: FAMILY MEDICINE | Facility: CLINIC | Age: 47
End: 2022-01-28
Payer: COMMERCIAL

## 2022-01-28 VITALS
OXYGEN SATURATION: 96 % | BODY MASS INDEX: 46.07 KG/M2 | HEART RATE: 82 BPM | DIASTOLIC BLOOD PRESSURE: 86 MMHG | WEIGHT: 260 LBS | SYSTOLIC BLOOD PRESSURE: 147 MMHG | RESPIRATION RATE: 18 BRPM | TEMPERATURE: 98 F | HEIGHT: 63 IN

## 2022-01-28 DIAGNOSIS — R07.89 CHEST WALL PAIN: ICD-10-CM

## 2022-01-28 DIAGNOSIS — Z11.52 ENCOUNTER FOR SCREENING LABORATORY TESTING FOR SEVERE ACUTE RESPIRATORY SYNDROME CORONAVIRUS 2 (SARS-COV-2): Primary | ICD-10-CM

## 2022-01-28 LAB
CTP QC/QA: YES
SARS-COV-2 AG RESP QL IA.RAPID: NEGATIVE

## 2022-01-28 PROCEDURE — 3077F SYST BP >= 140 MM HG: CPT | Mod: CPTII,,, | Performed by: NURSE PRACTITIONER

## 2022-01-28 PROCEDURE — 99213 OFFICE O/P EST LOW 20 MIN: CPT | Mod: ,,, | Performed by: NURSE PRACTITIONER

## 2022-01-28 PROCEDURE — 3077F PR MOST RECENT SYSTOLIC BLOOD PRESSURE >= 140 MM HG: ICD-10-PCS | Mod: CPTII,,, | Performed by: NURSE PRACTITIONER

## 2022-01-28 PROCEDURE — 1159F PR MEDICATION LIST DOCUMENTED IN MEDICAL RECORD: ICD-10-PCS | Mod: CPTII,,, | Performed by: NURSE PRACTITIONER

## 2022-01-28 PROCEDURE — 3008F PR BODY MASS INDEX (BMI) DOCUMENTED: ICD-10-PCS | Mod: CPTII,,, | Performed by: NURSE PRACTITIONER

## 2022-01-28 PROCEDURE — 3079F PR MOST RECENT DIASTOLIC BLOOD PRESSURE 80-89 MM HG: ICD-10-PCS | Mod: CPTII,,, | Performed by: NURSE PRACTITIONER

## 2022-01-28 PROCEDURE — 87426 SARS CORONAVIRUS 2 ANTIGEN POCT: ICD-10-PCS | Mod: QW,,, | Performed by: NURSE PRACTITIONER

## 2022-01-28 PROCEDURE — 1159F MED LIST DOCD IN RCRD: CPT | Mod: CPTII,,, | Performed by: NURSE PRACTITIONER

## 2022-01-28 PROCEDURE — 3008F BODY MASS INDEX DOCD: CPT | Mod: CPTII,,, | Performed by: NURSE PRACTITIONER

## 2022-01-28 PROCEDURE — 99213 PR OFFICE/OUTPT VISIT, EST, LEVL III, 20-29 MIN: ICD-10-PCS | Mod: ,,, | Performed by: NURSE PRACTITIONER

## 2022-01-28 PROCEDURE — 3079F DIAST BP 80-89 MM HG: CPT | Mod: CPTII,,, | Performed by: NURSE PRACTITIONER

## 2022-01-28 PROCEDURE — 87426 SARSCOV CORONAVIRUS AG IA: CPT | Mod: QW,,, | Performed by: NURSE PRACTITIONER

## 2022-01-28 RX ORDER — METHOCARBAMOL 500 MG/1
500 TABLET, FILM COATED ORAL 3 TIMES DAILY
Qty: 30 TABLET | Refills: 0 | Status: SHIPPED | OUTPATIENT
Start: 2022-01-28 | End: 2022-02-07

## 2022-01-28 NOTE — PROGRESS NOTES
SUNI Cruz   [facility]  [address]   [place] [address]     PATIENT NAME: Glendy Engle  : 1975  DATE: 22  MRN: [id number]      Billing Provider: SUNI Cruz  Level of Service: CT OFFICE/OUTPT VISIT, EST, LEVL III, 20-29 MIN  Patient PCP Information     Provider PCP Type    SUNI Cruz General          Reason for Visit / Chief Complaint: Chest Congestion       Update PCP  Update Chief Complaint         History of Present Illness / Problem Focused Workflow     Glendy Engle is a 46 y.o. female presents to the clinic  With onset last pm with  Chest discomfort, headache, and feels a lttle more short of breath.  She has been taking her GERD meds as directed.  She notes that the birds she is picking up are heavier now than prevoiusly.    Visit conducted in patients car due to covid with limited exam      Review of Systems     Review of Systems   Constitutional: Negative for fatigue.   HENT: Negative for nasal congestion and sore throat.    Respiratory: Positive for chest tightness and shortness of breath. Negative for cough.    Cardiovascular: Negative for chest pain, palpitations and leg swelling.   Gastrointestinal: Negative for nausea, vomiting and reflux.   Neurological: Positive for headaches. Negative for weakness and memory loss.   Psychiatric/Behavioral: Negative for confusion and sleep disturbance.        Medical / Social / Family History     Past Medical History:   Diagnosis Date    Abnormal pulmonary function test 2019    Abnormal radiograph     Asthma     GERD (gastroesophageal reflux disease)     Hypertension     SHERLYN on CPAP     Thyroid nodule        Past Surgical History:   Procedure Laterality Date    CHOLECYSTECTOMY      COLONOSCOPY      ESOPHAGOGASTRODUODENOSCOPY      HYSTERECTOMY      LEG SURGERY      vein surgery on right leg    REDUCTION OF BOTH BREASTS      VASCULAR SURGERY      Rt GSV Laser Ablation Phlebectomy: Dr. Sergey Esteves    "      Social History  Ms.  reports that she has never smoked. She has never used smokeless tobacco. She reports previous alcohol use. She reports that she does not use drugs.    Family History  Ms.'s family history includes Cancer in her father and maternal aunt; Diabetes in her maternal grandmother and paternal grandmother; Heart disease in her mother and paternal grandfather; Heart failure in her mother; Hypertension in her mother.    Medications and Allergies     Medications  No outpatient medications have been marked as taking for the 1/28/22 encounter (Office Visit) with SUNI Cruz.     Current Facility-Administered Medications for the 1/28/22 encounter (Office Visit) with Kelli Paez FNP   Medication Dose Route Frequency Provider Last Rate Last Admin    albuterol inhaler 2 puff  2 puff Inhalation Q20 Min PRN Kelli Philippe, FNP        sodium chloride 0.9% flush 10 mL  10 mL Intravenous PRN Kelli Philippe, FNP        [DISCONTINUED] acetaminophen tablet 650 mg  650 mg Oral Once PRN Kelli Philippe, FNP        [DISCONTINUED] diphenhydrAMINE injection 25 mg  25 mg Intravenous Once PRN Kelli Philippe, FNP        [DISCONTINUED] EPINEPHrine (EPIPEN) 0.3 mg/0.3 mL pen injection 0.3 mg  0.3 mg Intramuscular PRN Kelli Philippe, FNP        [DISCONTINUED] methylPREDNISolone sodium succinate injection 40 mg  40 mg Intravenous Once PRN Kelli Philippe, FNP        [DISCONTINUED] ondansetron disintegrating tablet 4 mg  4 mg Oral Once PRN Kelli Philippe, FNP        [DISCONTINUED] sodium chloride 0.9% 500 mL flush bag   Intravenous PRN Kelli Philippe, FNP           Allergies  Review of patient's allergies indicates:   Allergen Reactions    Bactrim [sulfamethoxazole-trimethoprim] Hives       Physical Examination     Vitals:    01/28/22 1435   BP: (!) 147/86   Pulse: 82   Resp: 18   Temp: 98.2 °F (36.8 °C)   SpO2: 96%   Weight: 117.9 kg (260 lb)   Height: 5' 3" (1.6 m)      Physical Exam  Constitutional:       Appearance: She is obese. "   Cardiovascular:      Rate and Rhythm: Normal rate and regular rhythm.   Pulmonary:      Effort: Pulmonary effort is normal.      Breath sounds: Normal breath sounds.   Musculoskeletal:      Comments: Very tender to palp right and left chest wall    Lymphadenopathy:      Cervical: No cervical adenopathy.   Skin:     General: Skin is warm and dry.   Neurological:      Mental Status: She is alert and oriented to person, place, and time.          Assessment and Plan (including Health Maintenance)      Problem List  Smart Sets  Document Outside HM   :    Plan:  Advised to continue with GERD meds, monitor bp at home.  Discussed that this is most likely chest wall strain/inflammation from picking up heavier birds. Recommended tylenol and robaxin for pain. Follow up if symptoms worsen        Health Maintenance Due   Topic Date Due    Hepatitis C Screening  Never done    Pneumococcal Vaccines (Age 0-64) (1 of 2 - PPSV23) Never done    HIV Screening  Never done       Problem List Items Addressed This Visit    None     Visit Diagnoses     Encounter for screening laboratory testing for severe acute respiratory syndrome coronavirus 2 (SARS-CoV-2)    -  Primary    Relevant Orders    SARS Coronavirus 2 Antigen, POCT (Completed)    Chest wall pain            Encounter for screening laboratory testing for severe acute respiratory syndrome coronavirus 2 (SARS-CoV-2)  -     SARS Coronavirus 2 Antigen, POCT    Chest wall pain       Health Maintenance Topics with due status: Not Due       Topic Last Completion Date    TETANUS VACCINE 05/30/2017    Colonoscopy 07/08/2020    Mammogram 11/15/2021       Procedures     Future Appointments   Date Time Provider Department Center   4/6/2022  1:30 PM Hugo Alvarado MD [facility]   11/14/2022  9:00 AM SUNI Cruz [facility]   12/13/2022  9:00 AM [facility] VAS [address] [facility]         No follow-ups on file.     Signature:  SUNI Cruz    Date  of encounter: 1/28/22

## 2022-02-07 PROBLEM — Z13.1 SCREENING FOR DIABETES MELLITUS: Status: RESOLVED | Noted: 2021-11-05 | Resolved: 2022-02-07

## 2022-02-11 ENCOUNTER — OFFICE VISIT (OUTPATIENT)
Dept: FAMILY MEDICINE | Facility: CLINIC | Age: 47
End: 2022-02-11
Payer: COMMERCIAL

## 2022-02-11 VITALS
HEIGHT: 63 IN | BODY MASS INDEX: 46.25 KG/M2 | RESPIRATION RATE: 18 BRPM | OXYGEN SATURATION: 98 % | HEART RATE: 73 BPM | SYSTOLIC BLOOD PRESSURE: 139 MMHG | WEIGHT: 261 LBS | DIASTOLIC BLOOD PRESSURE: 69 MMHG

## 2022-02-11 DIAGNOSIS — Z11.52 ENCOUNTER FOR SCREENING LABORATORY TESTING FOR SEVERE ACUTE RESPIRATORY SYNDROME CORONAVIRUS 2 (SARS-COV-2): ICD-10-CM

## 2022-02-11 DIAGNOSIS — R51.9 HEADACHE IN BACK OF HEAD: Primary | ICD-10-CM

## 2022-02-11 PROBLEM — R30.0 DYSURIA: Status: RESOLVED | Noted: 2021-12-07 | Resolved: 2022-02-11

## 2022-02-11 PROBLEM — N76.0 ACUTE VAGINITIS: Status: RESOLVED | Noted: 2021-11-05 | Resolved: 2022-02-11

## 2022-02-11 PROCEDURE — 1159F MED LIST DOCD IN RCRD: CPT | Mod: CPTII,,, | Performed by: NURSE PRACTITIONER

## 2022-02-11 PROCEDURE — 3008F PR BODY MASS INDEX (BMI) DOCUMENTED: ICD-10-PCS | Mod: CPTII,,, | Performed by: NURSE PRACTITIONER

## 2022-02-11 PROCEDURE — 99214 PR OFFICE/OUTPT VISIT, EST, LEVL IV, 30-39 MIN: ICD-10-PCS | Mod: 25,,, | Performed by: NURSE PRACTITIONER

## 2022-02-11 PROCEDURE — 3008F BODY MASS INDEX DOCD: CPT | Mod: CPTII,,, | Performed by: NURSE PRACTITIONER

## 2022-02-11 PROCEDURE — 96372 THER/PROPH/DIAG INJ SC/IM: CPT | Mod: ,,, | Performed by: NURSE PRACTITIONER

## 2022-02-11 PROCEDURE — 3078F DIAST BP <80 MM HG: CPT | Mod: CPTII,,, | Performed by: NURSE PRACTITIONER

## 2022-02-11 PROCEDURE — 99214 OFFICE O/P EST MOD 30 MIN: CPT | Mod: 25,,, | Performed by: NURSE PRACTITIONER

## 2022-02-11 PROCEDURE — 3075F PR MOST RECENT SYSTOLIC BLOOD PRESS GE 130-139MM HG: ICD-10-PCS | Mod: CPTII,,, | Performed by: NURSE PRACTITIONER

## 2022-02-11 PROCEDURE — 1159F PR MEDICATION LIST DOCUMENTED IN MEDICAL RECORD: ICD-10-PCS | Mod: CPTII,,, | Performed by: NURSE PRACTITIONER

## 2022-02-11 PROCEDURE — 3075F SYST BP GE 130 - 139MM HG: CPT | Mod: CPTII,,, | Performed by: NURSE PRACTITIONER

## 2022-02-11 PROCEDURE — 96372 PR INJECTION,THERAP/PROPH/DIAG2ST, IM OR SUBCUT: ICD-10-PCS | Mod: ,,, | Performed by: NURSE PRACTITIONER

## 2022-02-11 PROCEDURE — 3078F PR MOST RECENT DIASTOLIC BLOOD PRESSURE < 80 MM HG: ICD-10-PCS | Mod: CPTII,,, | Performed by: NURSE PRACTITIONER

## 2022-02-11 RX ORDER — KETOROLAC TROMETHAMINE 30 MG/ML
30 INJECTION, SOLUTION INTRAMUSCULAR; INTRAVENOUS
Status: COMPLETED | OUTPATIENT
Start: 2022-02-11 | End: 2022-02-11

## 2022-02-11 RX ADMIN — KETOROLAC TROMETHAMINE 30 MG: 30 INJECTION, SOLUTION INTRAMUSCULAR; INTRAVENOUS at 03:02

## 2022-02-11 NOTE — PROGRESS NOTES
SUNI Cruz   [facility]  [address]   [place] [address]     PATIENT NAME: Glendy Engle  : 1975  DATE: 22  MRN: [id number]      Billing Provider: SUNI Cruz  Level of Service:   Patient PCP Information     Provider PCP Type    SUNI Cruz General          Reason for Visit / Chief Complaint: Headache       Update PCP  Update Chief Complaint         History of Present Illness / Problem Focused Workflow     Glendy Engle is a 46 y.o. female presents to the clinic  With 2 day history of persistent headache that is at crown and feels like something thumping and is different that normal headaches.  She has had some nausea  And is taking zofran.  She notes that bright light makes her head  Hurt worse but no problems with  Loud noises.    She has had little sinus congestion  Off an on for the past week. No fever but has had  Some chills and dry cough.  Unable to sleep due to headache and is not using cpap due to recall. 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  She has  History of chronic back pain and follows with pain management.  She was negative for Covid 2 weeks ago.      Review of Systems     Review of Systems   Constitutional: Negative for fatigue.   HENT: Negative for nasal congestion and sore throat.    Respiratory: Negative for cough, chest tightness and shortness of breath.    Cardiovascular: Negative for chest pain, palpitations and leg swelling.   Gastrointestinal: Positive for nausea. Negative for vomiting and reflux.   Neurological: Positive for headaches. Negative for weakness and memory loss.   Psychiatric/Behavioral: Negative for confusion and sleep disturbance.        Medical / Social / Family History     Past Medical History:  "  Diagnosis Date    Abnormal pulmonary function test 12/2019    Abnormal radiograph     Asthma     GERD (gastroesophageal reflux disease)     Hypertension     SHERLYN on CPAP     Thyroid nodule        Past Surgical History:   Procedure Laterality Date    CHOLECYSTECTOMY      COLONOSCOPY      ESOPHAGOGASTRODUODENOSCOPY      HYSTERECTOMY      LEG SURGERY      vein surgery on right leg    REDUCTION OF BOTH BREASTS      VASCULAR SURGERY      Rt GSV Laser Ablation Phlebectomy: Dr. Sergey Esteves  2017       Social History  Ms.  reports that she has never smoked. She has never used smokeless tobacco. She reports previous alcohol use. She reports that she does not use drugs.    Family History  Ms.'s family history includes Cancer in her father and maternal aunt; Diabetes in her maternal grandmother and paternal grandmother; Heart disease in her mother and paternal grandfather; Heart failure in her mother; Hypertension in her mother.    Medications and Allergies     Medications  No outpatient medications have been marked as taking for the 2/11/22 encounter (Office Visit) with SUNI Cruz.     Current Facility-Administered Medications for the 2/11/22 encounter (Office Visit) with SUNI Cruz   Medication Dose Route Frequency Provider Last Rate Last Admin    albuterol inhaler 2 puff  2 puff Inhalation Q20 Min PRN SUNI Cruz        sodium chloride 0.9% flush 10 mL  10 mL Intravenous PRN SUNI Cruz           Allergies  Review of patient's allergies indicates:   Allergen Reactions    Bactrim [sulfamethoxazole-trimethoprim] Hives       Physical Examination     Vitals:    02/11/22 1428   BP: 139/69   Pulse: 73   Resp: 18   SpO2: 98%   Weight: 118.4 kg (261 lb)   Height: 5' 3" (1.6 m)      Physical Exam  Constitutional:       Appearance: She is obese.   Cardiovascular:      Rate and Rhythm: Normal rate and regular rhythm.      Pulses: Normal pulses.   Pulmonary:      Breath sounds: Normal breath sounds. "   Musculoskeletal:      Comments: Tender to palp along crown of head and neck and along shoulder muscles.  NO discomfort with range of motion of neck.   Skin:     General: Skin is warm and dry.   Neurological:      General: No focal deficit present.      Mental Status: She is alert and oriented to person, place, and time.          Assessment and Plan (including Health Maintenance)      Problem List  Smart Sets  Document Outside HM   :    Plan:  Will continue with current meds.  Take muscle relaxer  Twice daily, stretching exercises, heat to  Neck muscles prn.  Will get shot of toradol  For pain today and continue with current pain meds.        Health Maintenance Due   Topic Date Due    Hepatitis C Screening  Never done    Pneumococcal Vaccines (Age 0-64) (1 of 2 - PPSV23) Never done    HIV Screening  Never done       Problem List Items Addressed This Visit    None       There are no diagnoses linked to this encounter.   Health Maintenance Topics with due status: Not Due       Topic Last Completion Date    TETANUS VACCINE 05/30/2017    Colonoscopy 07/08/2020    Mammogram 11/15/2021       Procedures     Future Appointments   Date Time Provider Department Center   4/6/2022  1:30 PM Hugo Alvarado MD [facility]   11/14/2022  9:00 AM SUNI Cruz [facility]   12/13/2022  9:00 AM [facility] [address] [facility]         No follow-ups on file.     Signature:  SUNI Cruz    Date of encounter: 2/11/22

## 2022-04-05 ENCOUNTER — HOSPITAL ENCOUNTER (EMERGENCY)
Facility: HOSPITAL | Age: 47
Discharge: HOME | End: 2022-04-05
Payer: COMMERCIAL

## 2022-04-05 VITALS
HEIGHT: 63 IN | BODY MASS INDEX: 44.83 KG/M2 | HEART RATE: 94 BPM | DIASTOLIC BLOOD PRESSURE: 94 MMHG | WEIGHT: 253 LBS | TEMPERATURE: 98 F | OXYGEN SATURATION: 96 % | RESPIRATION RATE: 20 BRPM | SYSTOLIC BLOOD PRESSURE: 158 MMHG

## 2022-04-05 DIAGNOSIS — R10.9 FLANK PAIN: Primary | ICD-10-CM

## 2022-04-05 LAB
BILIRUB UR QL STRIP: NEGATIVE
CLARITY UR: CLEAR
COLOR UR: YELLOW
GLUCOSE UR STRIP-MCNC: NEGATIVE MG/DL
HCG UR QL IA.RAPID: NEGATIVE
KETONES UR STRIP-SCNC: NEGATIVE MG/DL
LEUKOCYTE ESTERASE UR QL STRIP: NEGATIVE
NITRITE UR QL STRIP: NEGATIVE
PH UR STRIP: 6 PH UNITS
PROT UR QL STRIP: NEGATIVE
RBC # UR STRIP: NEGATIVE /UL
SP GR UR STRIP: 1.02
UROBILINOGEN UR STRIP-ACNC: 0.2 MG/DL

## 2022-04-05 PROCEDURE — 63600175 PHARM REV CODE 636 W HCPCS: Performed by: REGISTERED NURSE

## 2022-04-05 PROCEDURE — 99283 PR EMERGENCY DEPT VISIT,LEVEL III: ICD-10-PCS | Mod: ,,, | Performed by: REGISTERED NURSE

## 2022-04-05 PROCEDURE — 81025 URINE PREGNANCY TEST: CPT | Performed by: REGISTERED NURSE

## 2022-04-05 PROCEDURE — 96372 THER/PROPH/DIAG INJ SC/IM: CPT

## 2022-04-05 PROCEDURE — 81003 URINALYSIS AUTO W/O SCOPE: CPT | Performed by: REGISTERED NURSE

## 2022-04-05 PROCEDURE — 99283 EMERGENCY DEPT VISIT LOW MDM: CPT | Mod: ,,, | Performed by: REGISTERED NURSE

## 2022-04-05 PROCEDURE — 99284 EMERGENCY DEPT VISIT MOD MDM: CPT

## 2022-04-05 RX ORDER — KETOROLAC TROMETHAMINE 30 MG/ML
30 INJECTION, SOLUTION INTRAMUSCULAR; INTRAVENOUS
Status: COMPLETED | OUTPATIENT
Start: 2022-04-05 | End: 2022-04-05

## 2022-04-05 RX ORDER — PANTOPRAZOLE SODIUM 40 MG/1
40 TABLET, DELAYED RELEASE ORAL DAILY
COMMUNITY
End: 2023-03-08 | Stop reason: ALTCHOICE

## 2022-04-05 RX ORDER — CIPROFLOXACIN 500 MG/1
500 TABLET ORAL 2 TIMES DAILY
Qty: 20 TABLET | Refills: 0 | Status: SHIPPED | OUTPATIENT
Start: 2022-04-05 | End: 2022-04-15

## 2022-04-05 RX ORDER — PHENAZOPYRIDINE HYDROCHLORIDE 100 MG/1
100 TABLET, FILM COATED ORAL 3 TIMES DAILY PRN
COMMUNITY
End: 2023-01-27

## 2022-04-05 RX ORDER — NITROFURANTOIN 25; 75 MG/1; MG/1
100 CAPSULE ORAL
COMMUNITY
End: 2022-04-05 | Stop reason: ALTCHOICE

## 2022-04-05 RX ADMIN — KETOROLAC TROMETHAMINE 30 MG: 30 INJECTION, SOLUTION INTRAMUSCULAR; INTRAVENOUS at 11:04

## 2022-04-06 ENCOUNTER — OFFICE VISIT (OUTPATIENT)
Dept: PULMONOLOGY | Facility: CLINIC | Age: 47
End: 2022-04-06
Payer: COMMERCIAL

## 2022-04-06 ENCOUNTER — TELEPHONE (OUTPATIENT)
Dept: EMERGENCY MEDICINE | Facility: HOSPITAL | Age: 47
End: 2022-04-06
Payer: COMMERCIAL

## 2022-04-06 VITALS
SYSTOLIC BLOOD PRESSURE: 126 MMHG | WEIGHT: 253 LBS | OXYGEN SATURATION: 94 % | DIASTOLIC BLOOD PRESSURE: 80 MMHG | HEIGHT: 63 IN | HEART RATE: 75 BPM | RESPIRATION RATE: 16 BRPM | BODY MASS INDEX: 44.83 KG/M2

## 2022-04-06 DIAGNOSIS — J45.30 MILD PERSISTENT ASTHMA WITHOUT COMPLICATION: ICD-10-CM

## 2022-04-06 PROCEDURE — 99215 OFFICE O/P EST HI 40 MIN: CPT | Mod: PBBFAC | Performed by: INTERNAL MEDICINE

## 2022-04-06 PROCEDURE — 3074F PR MOST RECENT SYSTOLIC BLOOD PRESSURE < 130 MM HG: ICD-10-PCS | Mod: CPTII,,, | Performed by: INTERNAL MEDICINE

## 2022-04-06 PROCEDURE — 3008F PR BODY MASS INDEX (BMI) DOCUMENTED: ICD-10-PCS | Mod: CPTII,,, | Performed by: INTERNAL MEDICINE

## 2022-04-06 PROCEDURE — 99213 OFFICE O/P EST LOW 20 MIN: CPT | Mod: S$PBB,,, | Performed by: INTERNAL MEDICINE

## 2022-04-06 PROCEDURE — 1159F PR MEDICATION LIST DOCUMENTED IN MEDICAL RECORD: ICD-10-PCS | Mod: CPTII,,, | Performed by: INTERNAL MEDICINE

## 2022-04-06 PROCEDURE — 3079F DIAST BP 80-89 MM HG: CPT | Mod: CPTII,,, | Performed by: INTERNAL MEDICINE

## 2022-04-06 PROCEDURE — 3074F SYST BP LT 130 MM HG: CPT | Mod: CPTII,,, | Performed by: INTERNAL MEDICINE

## 2022-04-06 PROCEDURE — 3079F PR MOST RECENT DIASTOLIC BLOOD PRESSURE 80-89 MM HG: ICD-10-PCS | Mod: CPTII,,, | Performed by: INTERNAL MEDICINE

## 2022-04-06 PROCEDURE — 3008F BODY MASS INDEX DOCD: CPT | Mod: CPTII,,, | Performed by: INTERNAL MEDICINE

## 2022-04-06 PROCEDURE — 1159F MED LIST DOCD IN RCRD: CPT | Mod: CPTII,,, | Performed by: INTERNAL MEDICINE

## 2022-04-06 PROCEDURE — 99213 PR OFFICE/OUTPT VISIT, EST, LEVL III, 20-29 MIN: ICD-10-PCS | Mod: S$PBB,,, | Performed by: INTERNAL MEDICINE

## 2022-04-06 NOTE — ED PROVIDER NOTES
Encounter Date: 4/5/2022       History     Chief Complaint   Patient presents with    Flank Pain    Abdominal Pain     Bilateral flank pain radiating to low abd.  Dx'ed with UTI on 4/2/22 but pain is unresponsive to antibiotics     47y-old  female received ambulatory to room 2 with complaint of bilateral flank pain rated as a 8 on 0-10 point scale. Patient also c/o dysuria, and urinary frequency. No other complaints voiced. Patient states that symptoms started on 04/01/2022 and she was seen at a local urgent care on 04/02/2022 and was started on macrobid.         Review of patient's allergies indicates:   Allergen Reactions    Bactrim [sulfamethoxazole-trimethoprim] Hives     Past Medical History:   Diagnosis Date    Abnormal pulmonary function test 12/2019    Abnormal radiograph     Asthma     GERD (gastroesophageal reflux disease)     Hypertension     SHERLYN on CPAP     Thyroid nodule      Past Surgical History:   Procedure Laterality Date    CHOLECYSTECTOMY      COLONOSCOPY      ESOPHAGOGASTRODUODENOSCOPY      HYSTERECTOMY      LEG SURGERY      vein surgery on right leg    REDUCTION OF BOTH BREASTS      VASCULAR SURGERY      Rt GSV Laser Ablation Phlebectomy: Dr. Sergey Esteves  2017     Family History   Problem Relation Age of Onset    Heart failure Mother     Heart disease Mother     Hypertension Mother     Cancer Father     Cancer Maternal Aunt     Diabetes Maternal Grandmother     Diabetes Paternal Grandmother     Heart disease Paternal Grandfather      Social History     Tobacco Use    Smoking status: Never Smoker    Smokeless tobacco: Never Used   Substance Use Topics    Alcohol use: Not Currently    Drug use: Never     Review of Systems   Constitutional: Negative.    HENT: Negative.    Eyes: Negative.    Respiratory: Negative.    Cardiovascular: Negative.    Gastrointestinal: Negative.    Endocrine: Negative.    Genitourinary: Positive for decreased urine volume, difficulty  urinating, dysuria, flank pain, frequency and urgency.   Skin: Negative.    Allergic/Immunologic: Negative.    Neurological: Negative.    Hematological: Negative.    Psychiatric/Behavioral: Negative.        Physical Exam     Initial Vitals [04/05/22 2150]   BP Pulse Resp Temp SpO2   (!) 159/121 97 20 98.2 °F (36.8 °C) (!) 93 %      MAP       --         Physical Exam    Constitutional: She appears well-developed and well-nourished. She is not diaphoretic. No distress.   HENT:   Head: Normocephalic and atraumatic.   Right Ear: External ear normal.   Left Ear: External ear normal.   Nose: Nose normal.   Mouth/Throat: Oropharynx is clear and moist. No oropharyngeal exudate.   Neck: Neck supple.   Normal range of motion.  Cardiovascular: Normal rate, regular rhythm, normal heart sounds and intact distal pulses. Exam reveals no gallop and no friction rub.    No murmur heard.  Pulmonary/Chest: Breath sounds normal.   Abdominal: Abdomen is soft. Bowel sounds are normal. There is abdominal tenderness (Pelvic/bladder).   Musculoskeletal:         General: Normal range of motion.      Cervical back: Normal, normal range of motion and neck supple.      Thoracic back: Tenderness present.      Comments: CV tenderness worse on the left side.      Neurological: She is alert and oriented to person, place, and time. GCS score is 15. GCS eye subscore is 4. GCS verbal subscore is 5. GCS motor subscore is 6.   Skin: Skin is warm and dry. Capillary refill takes less than 2 seconds.   Psychiatric: She has a normal mood and affect. Her behavior is normal. Judgment and thought content normal.         Medical Screening Exam   See Full Note    ED Course   Procedures  Labs Reviewed   URINALYSIS, REFLEX TO URINE CULTURE - Normal   HCG QUALITATIVE URINE - Normal          Imaging Results    None          Medications - No data to display  Medical Decision Making:   ED Management:  Urinalysis negative for infectious process. Patient does have CV  tenderness that is worse on Left side. I discussed the lab findings with patient at length. I also discussed the possibility that patient may be experiencing interstitial cystitis. Will treat with Cipro for possible pyelo and refer to urology for further evaluation.                    Clinical Impression:   Final diagnoses:  [R10.9] Flank pain (Primary)          ED Disposition Condition    Discharge Stable        ED Prescriptions     Medication Sig Dispense Start Date End Date Auth. Provider    ciprofloxacin HCl (CIPRO) 500 MG tablet Take 1 tablet (500 mg total) by mouth 2 (two) times daily. for 10 days 20 tablet 4/5/2022 4/15/2022 ELICIA Jensen        Follow-up Information    None          ELICIA Jensen  04/05/22 5129

## 2022-04-06 NOTE — ASSESSMENT & PLAN NOTE
Asthma has been under great control over the last year patient is getting her flu vaccinations as well as her COVID vaccination she is doing well without complaints or problems.  Markedly stable.  Needs to lose weight increase activity

## 2022-04-06 NOTE — TELEPHONE ENCOUNTER
Pt states that she is still hurting, but less severe than last night. Encouraged pt to follow up with PCP for continued treatment.

## 2022-04-06 NOTE — PROGRESS NOTES
Subjective:       Patient ID: Glendy Engle is a 47 y.o. female.    Chief Complaint: Asthma (1 year follow up)    Asthma  She complains of shortness of breath. This is a recurrent problem. The current episode started more than 1 year ago. Asthma causes daytime symptoms monthly. Asthma causes nighttime symptoms never. The problem has been gradually improving. Pertinent negatives include no chest pain, ear pain or headaches. Her symptoms are aggravated by exercise. Her past medical history is significant for asthma.     Past Medical History:   Diagnosis Date    Abnormal pulmonary function test 12/2019    Abnormal radiograph     Asthma     GERD (gastroesophageal reflux disease)     Hypertension     SHERLYN on CPAP     Thyroid nodule      Past Surgical History:   Procedure Laterality Date    CHOLECYSTECTOMY      COLONOSCOPY      ESOPHAGOGASTRODUODENOSCOPY      HYSTERECTOMY      LEG SURGERY      vein surgery on right leg    REDUCTION OF BOTH BREASTS      VASCULAR SURGERY      Rt GSV Laser Ablation Phlebectomy: Dr. Sergey Esteves  2017     Family History   Problem Relation Age of Onset    Heart failure Mother     Heart disease Mother     Hypertension Mother     Cancer Father     Cancer Maternal Aunt     Diabetes Maternal Grandmother     Diabetes Paternal Grandmother     Heart disease Paternal Grandfather      Review of patient's allergies indicates:   Allergen Reactions    Bactrim [sulfamethoxazole-trimethoprim] Hives      Social History     Tobacco Use    Smoking status: Never Smoker    Smokeless tobacco: Never Used   Substance Use Topics    Alcohol use: Not Currently    Drug use: Never      Review of Systems   Constitutional: Negative for chills, activity change and night sweats.   HENT: Negative for congestion and ear pain.    Eyes: Negative for redness and itching.   Respiratory: Positive for shortness of breath.    Cardiovascular: Negative for chest pain and palpitations.   Musculoskeletal:  Negative for arthralgias and back pain.   Skin: Negative for rash.   Gastrointestinal: Negative for abdominal pain and abdominal distention.   Neurological: Negative for dizziness and headaches.   Hematological: Negative for adenopathy. Does not bruise/bleed easily.   Psychiatric/Behavioral: Negative for confusion. The patient is not nervous/anxious.        Objective:      Physical Exam   Constitutional: She is oriented to person, place, and time. She appears well-developed and well-nourished.   HENT:   Head: Normocephalic.   Nose: Nose normal.   Mouth/Throat: Oropharynx is clear and moist.   Neck: No JVD present. No thyromegaly present.   Cardiovascular: Normal rate, regular rhythm, normal heart sounds and intact distal pulses.   Pulmonary/Chest: Normal expansion, hyperinflation, symmetric chest wall expansion, effort normal and breath sounds normal.   Abdominal: Soft. Bowel sounds are normal.   Musculoskeletal:         General: Normal range of motion.      Cervical back: Normal range of motion and neck supple.   Lymphadenopathy: No supraclavicular adenopathy is present.     She has no cervical adenopathy.     She has no axillary adenopathy.   Neurological: She is alert and oriented to person, place, and time. She has normal reflexes.   Skin: Skin is warm and dry.   Psychiatric: She has a normal mood and affect. Her behavior is normal.     Personal Diagnostic Review  none pertinent    No flowsheet data found.      Assessment:       1. Mild persistent asthma without complication        Outpatient Encounter Medications as of 4/6/2022   Medication Sig Dispense Refill    albuterol (PROVENTIL/VENTOLIN HFA) 90 mcg/actuation inhaler Inhale 2 puffs into the lungs every 6 (six) hours as needed for Wheezing. Rescue 18 g 5    atorvastatin (LIPITOR) 20 MG tablet Take 1 tablet (20 mg total) by mouth once daily. 90 tablet 3    budesonide-formoterol 160-4.5 mcg (SYMBICORT) 160-4.5 mcg/actuation HFAA Inhale 2 puffs into the  lungs every 12 (twelve) hours. Controller      ciprofloxacin HCl (CIPRO) 500 MG tablet Take 1 tablet (500 mg total) by mouth 2 (two) times daily. for 10 days 20 tablet 0    cyclobenzaprine (FLEXERIL) 10 MG tablet Take 10 mg by mouth 3 (three) times daily as needed for Muscle spasms.      famotidine (PEPCID) 40 MG tablet Take 40 mg by mouth 2 (two) times daily.      fexofenadine (ALLEGRA) 180 MG tablet Take 180 mg by mouth once daily.      furosemide (LASIX) 20 MG tablet richar 1-2 tabs daily as needed for swelling.  Take potassium with Lasix/tm 60 tablet 11    HYDROcodone-acetaminophen (NORCO)  mg per tablet Take 1 tablet by mouth 2 (two) times daily.      lisinopriL (PRINIVIL,ZESTRIL) 20 MG tablet Take 1 tablet (20 mg total) by mouth once daily. 90 tablet 3    metFORMIN (GLUCOPHAGE) 500 MG tablet Take 1 tablet (500 mg total) by mouth 2 (two) times daily with meals. 180 tablet 3    pantoprazole (PROTONIX) 40 MG tablet Take 40 mg by mouth once daily.      paroxetine (PAXIL) 20 MG tablet Take 20 mg by mouth nightly.      phenazopyridine (PYRIDIUM) 100 MG tablet Take 100 mg by mouth 3 (three) times daily as needed for Pain.      potassium chloride (MICRO-K) 10 MEQ CpSR Take 20 mEq by mouth 2 (two) times daily. 10 mEq    2 caps   BID      sucralfate (CARAFATE) 1 gram tablet Take 1 g by mouth 2 (two) times daily. AM & HS       Facility-Administered Encounter Medications as of 4/6/2022   Medication Dose Route Frequency Provider Last Rate Last Admin    albuterol inhaler 2 puff  2 puff Inhalation Q20 Min PRN Kelli Philippe, FNP        sodium chloride 0.9% flush 10 mL  10 mL Intravenous PRN Kelli Philippe, FNP         No orders of the defined types were placed in this encounter.      Plan:       Problem List Items Addressed This Visit        Pulmonary    Mild persistent asthma without complication     Asthma has been under great control over the last year patient is getting her flu vaccinations as well as her COVID  vaccination she is doing well without complaints or problems.  Markedly stable.  Needs to lose weight increase activity

## 2022-05-11 ENCOUNTER — OFFICE VISIT (OUTPATIENT)
Dept: FAMILY MEDICINE | Facility: CLINIC | Age: 47
End: 2022-05-11
Payer: COMMERCIAL

## 2022-05-11 VITALS
HEIGHT: 63 IN | RESPIRATION RATE: 18 BRPM | BODY MASS INDEX: 45 KG/M2 | WEIGHT: 254 LBS | HEART RATE: 76 BPM | OXYGEN SATURATION: 98 % | SYSTOLIC BLOOD PRESSURE: 127 MMHG | DIASTOLIC BLOOD PRESSURE: 74 MMHG

## 2022-05-11 DIAGNOSIS — M54.16 LUMBAR BACK PAIN WITH RADICULOPATHY AFFECTING RIGHT LOWER EXTREMITY: ICD-10-CM

## 2022-05-11 DIAGNOSIS — N95.1 HOT FLASHES DUE TO MENOPAUSE: Primary | ICD-10-CM

## 2022-05-11 PROBLEM — G47.33 OSA ON CPAP: Chronic | Status: ACTIVE | Noted: 2021-04-05

## 2022-05-11 PROBLEM — J45.30 MILD PERSISTENT ASTHMA WITHOUT COMPLICATION: Chronic | Status: ACTIVE | Noted: 2021-04-05

## 2022-05-11 PROBLEM — E66.09 OBESITY DUE TO EXCESS CALORIES WITH SERIOUS COMORBIDITY: Chronic | Status: ACTIVE | Noted: 2021-04-05

## 2022-05-11 PROBLEM — I73.9 PERIPHERAL VASCULAR DISEASE: Chronic | Status: ACTIVE | Noted: 2021-09-10

## 2022-05-11 PROCEDURE — 3074F PR MOST RECENT SYSTOLIC BLOOD PRESSURE < 130 MM HG: ICD-10-PCS | Mod: CPTII,,, | Performed by: NURSE PRACTITIONER

## 2022-05-11 PROCEDURE — 1159F PR MEDICATION LIST DOCUMENTED IN MEDICAL RECORD: ICD-10-PCS | Mod: CPTII,,, | Performed by: NURSE PRACTITIONER

## 2022-05-11 PROCEDURE — 3074F SYST BP LT 130 MM HG: CPT | Mod: CPTII,,, | Performed by: NURSE PRACTITIONER

## 2022-05-11 PROCEDURE — 1159F MED LIST DOCD IN RCRD: CPT | Mod: CPTII,,, | Performed by: NURSE PRACTITIONER

## 2022-05-11 PROCEDURE — 3008F BODY MASS INDEX DOCD: CPT | Mod: CPTII,,, | Performed by: NURSE PRACTITIONER

## 2022-05-11 PROCEDURE — 3078F PR MOST RECENT DIASTOLIC BLOOD PRESSURE < 80 MM HG: ICD-10-PCS | Mod: CPTII,,, | Performed by: NURSE PRACTITIONER

## 2022-05-11 PROCEDURE — 3008F PR BODY MASS INDEX (BMI) DOCUMENTED: ICD-10-PCS | Mod: CPTII,,, | Performed by: NURSE PRACTITIONER

## 2022-05-11 PROCEDURE — 99214 PR OFFICE/OUTPT VISIT, EST, LEVL IV, 30-39 MIN: ICD-10-PCS | Mod: ,,, | Performed by: NURSE PRACTITIONER

## 2022-05-11 PROCEDURE — 99214 OFFICE O/P EST MOD 30 MIN: CPT | Mod: ,,, | Performed by: NURSE PRACTITIONER

## 2022-05-11 PROCEDURE — 3078F DIAST BP <80 MM HG: CPT | Mod: CPTII,,, | Performed by: NURSE PRACTITIONER

## 2022-05-11 RX ORDER — PAROXETINE HYDROCHLORIDE 20 MG/1
20 TABLET, FILM COATED ORAL NIGHTLY
Qty: 90 TABLET | Refills: 1 | Status: SHIPPED | OUTPATIENT
Start: 2022-05-11 | End: 2022-08-05 | Stop reason: SDUPTHER

## 2022-05-11 NOTE — PROGRESS NOTES
"   SUNI Cruz   [facility]  [address]   [place] [address]     PATIENT NAME: Glendy Engle  : 1975  DATE: 22  MRN: [id number]      Billing Provider: SUNI Cruz  Level of Service:   Patient PCP Information     Provider PCP Type    SUNI Cruz General          Reason for Visit / Chief Complaint: Follow-up and Blood Pressure Check       Update PCP  Update Chief Complaint         History of Present Illness / Problem Focused Workflow     Glendy Engle is a 47 y.o. female presents to the clinic  With persistent lower abdominal pain x 3 weeks. She went to Urgent care and was treated for UTI with Macrodantin and 3 days later the abdominal and back pain was worse and went to [place] [facility] and   Told they thougth she may have IC and no meds changed and advised to continue with AZO and gave her a pain shot.  Pain got a little better and then on  the pain in her lower abdomen/back got much worse an she went to [place] [facility].  She had CT scan and no stones and urine was clear.  She was supposed to be referrred to the Urology clinic but has not heard from them yet.    Since the weekend her pain has gotten better but sometimes will have pain pop up in her right  Kidney area and radiates around into her right side and is much worse. She has chronic lower back pain and is under care of Shanthi March NP in [place] and is taking Norco 10/325 three times daily with minimal relief.  She has her next appt one month.  Her last MRI was " a long time ago".      Review of Systems     Review of Systems   Constitutional: Negative for fatigue.   HENT: Negative for nasal congestion and sore throat.    Respiratory: Negative for cough, chest tightness and shortness of breath.    Cardiovascular: Negative for chest pain, palpitations and leg swelling.   Gastrointestinal: Positive for abdominal pain. Negative for nausea, vomiting and reflux.   Musculoskeletal: Positive for back pain.   Neurological: " Negative for weakness and memory loss.   Psychiatric/Behavioral: Negative for confusion and sleep disturbance.        Medical / Social / Family History     Past Medical History:   Diagnosis Date    Abnormal pulmonary function test 12/2019    Abnormal radiograph     Asthma     GERD (gastroesophageal reflux disease)     Hypertension     SHERLYN on CPAP     Thyroid nodule        Past Surgical History:   Procedure Laterality Date    CHOLECYSTECTOMY      COLONOSCOPY      ESOPHAGOGASTRODUODENOSCOPY      HYSTERECTOMY      LEG SURGERY      vein surgery on right leg    REDUCTION OF BOTH BREASTS      VASCULAR SURGERY      Rt GSV Laser Ablation Phlebectomy: Dr. Sergey Esteves  2017       Social History  Ms.  reports that she has never smoked. She has never used smokeless tobacco. She reports previous alcohol use. She reports that she does not use drugs.    Family History  Ms.'s family history includes Cancer in her father and maternal aunt; Diabetes in her maternal grandmother and paternal grandmother; Heart disease in her mother and paternal grandfather; Heart failure in her mother; Hypertension in her mother.    Medications and Allergies     Medications  Outpatient Medications Marked as Taking for the 5/11/22 encounter (Office Visit) with SUNI Cruz   Medication Sig Dispense Refill    albuterol (PROVENTIL/VENTOLIN HFA) 90 mcg/actuation inhaler Inhale 2 puffs into the lungs every 6 (six) hours as needed for Wheezing. Rescue 18 g 5    atorvastatin (LIPITOR) 20 MG tablet Take 1 tablet (20 mg total) by mouth once daily. 90 tablet 3    budesonide-formoterol 160-4.5 mcg (SYMBICORT) 160-4.5 mcg/actuation HFAA Inhale 2 puffs into the lungs every 12 (twelve) hours. Controller      cyclobenzaprine (FLEXERIL) 10 MG tablet Take 10 mg by mouth 3 (three) times daily as needed for Muscle spasms.      famotidine (PEPCID) 40 MG tablet Take 40 mg by mouth 2 (two) times daily.      fexofenadine (ALLEGRA) 180 MG tablet Take 180  "mg by mouth once daily.      furosemide (LASIX) 20 MG tablet richar 1-2 tabs daily as needed for swelling.  Take potassium with Lasix/tm 60 tablet 11    HYDROcodone-acetaminophen (NORCO)  mg per tablet Take 1 tablet by mouth 2 (two) times daily.      lisinopriL (PRINIVIL,ZESTRIL) 20 MG tablet Take 1 tablet (20 mg total) by mouth once daily. 90 tablet 3    metFORMIN (GLUCOPHAGE) 500 MG tablet Take 1 tablet (500 mg total) by mouth 2 (two) times daily with meals. 180 tablet 3    pantoprazole (PROTONIX) 40 MG tablet Take 40 mg by mouth once daily.      paroxetine (PAXIL) 20 MG tablet Take 20 mg by mouth nightly.      phenazopyridine (PYRIDIUM) 100 MG tablet Take 100 mg by mouth 3 (three) times daily as needed for Pain.      potassium chloride (MICRO-K) 10 MEQ CpSR Take 20 mEq by mouth 2 (two) times daily. 10 mEq    2 caps   BID      sucralfate (CARAFATE) 1 gram tablet Take 1 g by mouth 2 (two) times daily. AM & HS       Current Facility-Administered Medications for the 5/11/22 encounter (Office Visit) with SUNI Cruz   Medication Dose Route Frequency Provider Last Rate Last Admin    albuterol inhaler 2 puff  2 puff Inhalation Q20 Min PRN SUNI Cruz        sodium chloride 0.9% flush 10 mL  10 mL Intravenous PRN SUNI Cruz           Allergies  Review of patient's allergies indicates:   Allergen Reactions    Bactrim [sulfamethoxazole-trimethoprim] Hives       Physical Examination     Vitals:    05/11/22 1351   BP: 127/74   Pulse: 76   Resp: 18   SpO2: 98%   Weight: 115.2 kg (254 lb)   Height: 5' 3" (1.6 m)      Physical Exam  Constitutional:       Appearance: She is obese.   Cardiovascular:      Rate and Rhythm: Normal rate and regular rhythm.      Pulses: Normal pulses.      Heart sounds: Normal heart sounds.   Pulmonary:      Effort: Pulmonary effort is normal.      Breath sounds: Normal breath sounds.   Musculoskeletal:      Comments: Very tender to palp along lumbar spine but notes the " pain feels differenet when palpated right lower back from SI joint around and up into flank.  She has discomfort with hyperextension, rotation to the right. She has minimal discomfort with flexion at 75  Degrees but pain is worse with coming erect.  She has positive right straight leg raise.   Neurological:      Mental Status: She is alert.          Assessment and Plan (including Health Maintenance)      Problem List  Smart Sets  Document Outside HM   :    Plan:  Discussed with patient that the most likely etiology is her back--recommend updating MRI and will attempt to get this approved to document source of pain.   Continue with  Current pain meds.   Follow up prn.        Health Maintenance Due   Topic Date Due    Hepatitis C Screening  Never done    HIV Screening  Never done       Problem List Items Addressed This Visit    None       There are no diagnoses linked to this encounter.   Health Maintenance Topics with due status: Not Due       Topic Last Completion Date    TETANUS VACCINE 05/30/2017    Colorectal Cancer Screening 07/08/2020    Lipid Panel 11/05/2021    Mammogram 11/15/2021       Procedures     Future Appointments   Date Time Provider Department Center   11/14/2022  9:00 AM SUNI Cruz [facility]   12/13/2022  9:00 AM [facility] ILEANA CARLOS [facility]    4/6/2023  1:00 PM Hugo Alvarado MD [facility]  [facility]        No follow-ups on file.     Signature:  SUNI Cruz    Date of encounter: 5/11/22

## 2022-05-23 ENCOUNTER — TELEPHONE (OUTPATIENT)
Dept: FAMILY MEDICINE | Facility: CLINIC | Age: 47
End: 2022-05-23
Payer: COMMERCIAL

## 2022-05-23 NOTE — TELEPHONE ENCOUNTER
Received notice from Sarah Marsh that patient's insurance will not pay for MRI of spine until she has physical therapy.  Will notify pt and advise her to discuss with her  Pain treatment team and refer to physical therapy with Mehdi Forde if desired/tm

## 2022-05-25 DIAGNOSIS — M54.16 LUMBAR RADICULOPATHY: Primary | ICD-10-CM

## 2022-06-03 ENCOUNTER — CLINICAL SUPPORT (OUTPATIENT)
Dept: REHABILITATION | Facility: HOSPITAL | Age: 47
End: 2022-06-03
Payer: COMMERCIAL

## 2022-06-03 DIAGNOSIS — M54.16 LUMBAR BACK PAIN WITH RADICULOPATHY AFFECTING RIGHT LOWER EXTREMITY: Primary | ICD-10-CM

## 2022-06-03 DIAGNOSIS — M54.16 LUMBAR RADICULOPATHY: ICD-10-CM

## 2022-06-03 PROCEDURE — 97161 PT EVAL LOW COMPLEX 20 MIN: CPT | Mod: PN

## 2022-06-03 PROCEDURE — 97110 THERAPEUTIC EXERCISES: CPT | Mod: PN

## 2022-06-03 NOTE — PLAN OF CARE
[facility]   Physical Therapy Initial Evaluation    Name: Glendy Engle  Clinic Number: [id number]    Therapy Diagnosis:   Encounter Diagnoses   Name Primary?    Lumbar radiculopathy     Lumbar back pain with radiculopathy affecting right lower extremity Yes     Physician: Kelli Paez FNP    Physician Orders: PT Eval and Treat    Medical Diagnosis from Referral: lumbar radiculopathy   Evaluation Date: 6/3/2022  Authorization Period Expiration: 7/2/2022  Plan of Care Expiration: kendra approved 10 visits plus eval until 8/2/2022  Visit # / Visits authorized: 1/11    Time In: 1350  Time Out: 1440  Total Appointment Time (timed & untimed codes): 50 minutes    Precautions: Standard    Subjective   Date of onset: pt states about 3 months ago she went to emergency room but has had chronic pain for a while. Pt states she has been having right sciatica down to her mid thigh. Pt voices increased pain with sitting and with internal rotation on the right side ( all symptoms of right si joint dysfunciton).        History of current condition - Glendy reports: hx below      Medical History:   Past Medical History:   Diagnosis Date    Abnormal pulmonary function test 12/2019    Abnormal radiograph     Asthma     GERD (gastroesophageal reflux disease)     Hypertension     SHERLYN on CPAP     Thyroid nodule        Surgical History:   Glendy Engle  has a past surgical history that includes Esophagogastroduodenoscopy; Colonoscopy; Hysterectomy; Vascular surgery; Cholecystectomy; Leg Surgery; and Reduction of both breasts.    Medications:   Glendy has a current medication list which includes the following prescription(s): albuterol, atorvastatin, budesonide-formoterol 160-4.5 mcg, cyclobenzaprine, famotidine, fexofenadine, furosemide, hydrocodone-acetaminophen, lisinopril, metformin, pantoprazole, paroxetine, phenazopyridine, potassium chloride, and sucralfate, and the following Facility-Administered  Medications: albuterol and sodium chloride 0.9%.    Allergies:   Review of patient's allergies indicates:   Allergen Reactions    Bactrim [sulfamethoxazole-trimethoprim] Hives        Imaging, none:  Pt needs an mri (awaiting insurance approval )    Prior Therapy: none   Social History:   lives with their spouse  Occupation: picks up eggs for local farmer   Prior Level of Function: indepdnent   Current Level of Function: pain with activity     Pain:  Current 7/10, worst 10/10, best 5/10   Location: right low back right si joint   Description: Aching, Dull, Burning, Throbbing, Grabbing and Tight  Aggravating Factors: Sitting, Standing and Lifting  Easing Factors: rest    Pts goals: be able to work pain free and sit without pain     Objective     Posture:  1. Standing lordosis: Increased  2. Sitting lordosis: Increased  3. Iliac crest height: right increased  4. PSIS height: right increased  5. Pelvic rotation/torsion: yes  6. Scoliosis: no  7. Lateral shift: right  8. Comments:    Forward bend:  Back bend:   Lateral trunk lean: right 30 left 15      MANUAL MUSCLE TEST  Right left   Hip flexion       L1-2 MMT number: 4/5 MMT strength: 4/5   Hip abduction  L4,5,  S1 MMT strength: 4/5 MMT strength: 4/5   Knee extension  L3 MMT strength: 4/5 MMT strength: 4/5   Knee flexion       L3,4 MMT strength: 4/5 MMT strength: 4/5   Ankle dorsiflexion   L4 MMT strength: 4/5 MMT strength: 4/5   Ext hallucis longus L5 MMT strength: 4/5 MMT strength: 4/5   Ankle plantar flexion S1 MMT strength: 4/5 MMT strength: 4/5      ROM/flexibility right left   Hip flexion (120) 90 95   Internal rotation (45) 20 45   External rotation (45) 45 45   Hamstring 90/90 (-10) -20 -20                    Special test Right  Left    SLR test < 60 degrees Negative Negative   SLR test > 60 degrees Negative Negative   Sitting slump test Negative Negative   Piriformis test Negative Negative   ESTEPHANIA test Negative Negative   SI forward bend Positive Negative    SI distraction Positive Negative   SI compression Positive Negative     Gait assessment:   Step length: decreased right     Assistive device: none     Palpation: right si joint pain     Dermatome:      Limitation/Restriction for FOTO lumbar  Survey    Therapist reviewed FOTO scores for Glendy Engle on 6/3/2022.   FOTO documents entered into [employer] - see Media section.    Limitation Score: 44%         TREATMENT       Glendy received the treatments listed below:  THERAPEUTIC EXERCISES to develop strength, endurance, ROM, flexibility, posture and core stabilization for 20 minutes including home ex program     Home Exercises and Patient Education Provided    Education provided:   - home ex program     Written Home Exercises Provided: yes.  Exercises were reviewed and Glendy was able to demonstrate them prior to the end of the session.  Glendy demonstrated good  understanding of the education provided.     See EMR under Media for exercises provided 6/3/2022.    Assessment   Glendy is a 47 y.o. female referred to outpatient Physical Therapy with a medical diagnosis of lumbar radiculopathy . Pt presents with decreased range of motion and strength.     Pt prognosis is Excellent.   Pt will benefit from skilled outpatient Physical Therapy to address the deficits stated above and in the chart below, provide pt/family education, and to maximize pt's level of independence.     Plan of care discussed with patient: Yes  Pt's spiritual, cultural and educational needs considered and patient is agreeable to the plan of care and goals as stated below:     Anticipated Barriers for therapy: decreased flexibility and right si joint dysfunction       Goals:  Short Term Goals: 4 weeks   Pt will be independent with home ex program  Pt will increase bilateral hip flexion to 110 degrees  Increase bilateral hamstring length to -10 degrees   Increase lower ext strength to 5/5   Decrease pain to 5/10    Long Term Goals: 6 weeks   Be  able to stoop and bend pain free  Decrease pain to 2/10  Return to work with pain less than 2/10    Plan   Plan of care Certification: 6/3/2022 to 7/2/2022.    Outpatient Physical Therapy 2 times weekly for 4 weeks to include the following interventions: Patient Education and Therapeutic Exercise, modalities as needed. .     Mehdi Jain, PT

## 2022-06-06 ENCOUNTER — CLINICAL SUPPORT (OUTPATIENT)
Dept: REHABILITATION | Facility: HOSPITAL | Age: 47
End: 2022-06-06
Payer: COMMERCIAL

## 2022-06-06 DIAGNOSIS — M54.16 LUMBAR RADICULOPATHY: Primary | ICD-10-CM

## 2022-06-06 DIAGNOSIS — M54.16 LUMBAR BACK PAIN WITH RADICULOPATHY AFFECTING RIGHT LOWER EXTREMITY: ICD-10-CM

## 2022-06-06 PROCEDURE — 97110 THERAPEUTIC EXERCISES: CPT | Mod: PN

## 2022-06-06 PROCEDURE — 97014 ELECTRIC STIMULATION THERAPY: CPT | Mod: PN

## 2022-06-06 NOTE — PROGRESS NOTES
Physical Therapy Treatment Note     Name: Glendy Engle  Clinic Number: [id number]    Therapy Diagnosis:   Encounter Diagnoses   Name Primary?    Lumbar radiculopathy Yes    Lumbar back pain with radiculopathy affecting right lower extremity      Physician: Kelli Paez FNP    Visit Date: 6/6/2022         Physician Orders: PT Eval and Treat    Medical Diagnosis from Referral: lumbar radiculopathy   Evaluation Date: 6/3/2022  Authorization Period Expiration: 7/2/2022  Plan of Care Expiration: sametter approved 10 visits plus eval until 8/2/2022  Visit # / Visits authorized: 2/11  Time In: 1055  Time Out: 1138  Total Billable Time: 43 minutes    Precautions: Standard       Subjective     Pt reports: pt states she is a lot better. .  She was compliant with home exercise program.       Pain: 4/10  Location: right back  Right si joint     Objective     Glendy received therapeutic exercises to develop strength, endurance, ROM, flexibility, posture and core stabilization for 30 minutes including:    Bike x 5 min  Slant board x 2 min   Pt double support leg press x 20 reps   Pt double support calf press x 20 reps   Bilateral double knee to chest x 5 reps  Bilateral single knee to chest x 5 reps   Bilateral piriformis stretch x 5 reps   physioball hamstring curls  X 20 reps  physioball trunk rotation x 20 reps   Bilateral hamstring contract -relax x 5 reps     Range of motion       Hip flexion right     100      Left  105  Hamstring right   -30        Left -30                 Glendy received the following supervised modalities after being cleared for contradictions: IFC Electrical Stimulation:  Glendy received IFC Electrical Stimulation for pain control applied to the low back . Pt received stimulation at 100  % scan at a frequency of 18 for 20 minutes. Glendy tolderated treatment well without any adverse effects.      Glendy received hot pack for 20 minutes to low back .           Home Exercises Provided and  Patient Education Provided     Education provided: cont home ex program    Written Home Exercises Provided: yes.  Exercises were reviewed and Glendy was able to demonstrate them prior to the end of the session.  Glendy demonstrated good  understanding of the education provided.     See EMR under Media for exercises provided prior visit.    Assessment     Pt improving with range of motion   Glendy Is progressing well towards her goals.   Pt prognosis is Excellent.     Pt will continue to benefit from skilled outpatient physical therapy to address the deficits listed in the problem list box on initial evaluation, provide pt/family education and to maximize pt's level of independence in the home and community environment.     Pt's spiritual, cultural and educational needs considered and pt agreeable to plan of care and goals.     Anticipated barriers to physical therapy: decreased range of motion and muscle tightness     Anticipated Barriers for therapy: decreased flexibility and right si joint dysfunction         Goals:  Short Term Goals: 4 weeks   Pt will be independent with home ex program  Pt will increase bilateral hip flexion to 110 degrees  Increase bilateral hamstring length to -10 degrees   Increase lower ext strength to 5/5   Decrease pain to 5/10     Long Term Goals: 6 weeks   Be able to stoop and bend pain free  Decrease pain to 2/10  Return to work with pain less than 2/10     Plan   Plan of care Certification: 6/3/2022 to 7/2/2022.     Outpatient Physical Therapy 2 times weekly for 4 weeks to include the following interventions: Patient Education and Therapeutic Exercise, modalities as needed        Mehdi Jain, PT  6/6/2022

## 2022-06-08 ENCOUNTER — CLINICAL SUPPORT (OUTPATIENT)
Dept: REHABILITATION | Facility: HOSPITAL | Age: 47
End: 2022-06-08
Payer: COMMERCIAL

## 2022-06-08 DIAGNOSIS — M25.69 DECREASED RANGE OF MOTION OF TRUNK AND BACK: ICD-10-CM

## 2022-06-08 DIAGNOSIS — M54.16 LUMBAR RADICULOPATHY: Primary | ICD-10-CM

## 2022-06-08 PROCEDURE — 97014 ELECTRIC STIMULATION THERAPY: CPT | Mod: PN,CQ

## 2022-06-08 PROCEDURE — 97110 THERAPEUTIC EXERCISES: CPT | Mod: PN,CQ

## 2022-06-08 NOTE — PROGRESS NOTES
Physical Therapy Treatment Note     Name: Glendy Engle  Clinic Number: [id number]    Therapy Diagnosis:   Encounter Diagnoses   Name Primary?    Lumbar radiculopathy Yes    Decreased range of motion of trunk and back      Physician: Kelli Paez FNP    Visit Date: 6/8/2022    Physician Orders: PT Eval and Treat    Medical Diagnosis from Referral: lumbar radiculopathy   Evaluation Date: 6/3/2022  Authorization Period Expiration: 7/2/2022  Plan of Care Expiration: saritar approved 10 visits plus eval until 8/2/2022  Visit # / Visits authorized: 3/11  PTA VISIT # 1    Time In: 1056  Time Out: 1140  Total Billable Time: 44 minutes    Precautions: Standard     Plan of care reviewed with Mehdi Jain PT.   Subjective     Pt reports: pt states I'm hurting a little more today.  She was compliant with home exercise program.     Pain: 7/10  Location: right back  Right si joint     Objective     Glendy received therapeutic exercises to develop strength, endurance, ROM, flexibility, posture and core stabilization for 31  minutes including:    Bike x 6 min  Slant board x 2 min   Bilateral hamstrings stretch on step x 5  Bilateral side bend stretch x 5  Hip adduction with bolster x 10  Bilateral double knee to chest x 5 reps  Bilateral single knee to chest x 5 reps   Bilateral piriformis stretch x 5 reps   physioball hamstring curls  X 20 reps  physioball trunk rotation x 20 reps     Range of motion     Hip flexion right     112      Left  113  Hamstring right   -28        Left -18    Glendy received the following supervised modalities after being cleared for contradictions: IFC Electrical Stimulation:  Glendy received IFC Electrical Stimulation for pain control applied to the low back . Pt received stimulation at 100  % scan at a frequency of 15 for 15 minutes. Glendy tolderated treatment well without any adverse effects.      Glendy received hot pack for 15 minutes to low back .    Home Exercises Provided and  Patient Education Provided     Education provided: cont home ex program    Written Home Exercises Provided: yes.  Exercises were reviewed and Glendy was able to demonstrate them prior to the end of the session.  Glendy demonstrated good  understanding of the education provided.     See EMR under Media for exercises provided prior visit.    Assessment     Pt had improved range of motion in all planes, voicing decreased pain 3/10 after treatment.   Glendy Is progressing well towards her goals.   Pt prognosis is Excellent.     Pt will continue to benefit from skilled outpatient physical therapy to address the deficits listed in the problem list box on initial evaluation, provide pt/family education and to maximize pt's level of independence in the home and community environment.     Pt's spiritual, cultural and educational needs considered and pt agreeable to plan of care and goals.     Anticipated barriers to physical therapy: decreased range of motion and muscle tightness     Anticipated Barriers for therapy: decreased flexibility and right si joint dysfunction        Goals:  Short Term Goals: 4 weeks   Pt will be independent with home ex program-met  Pt will increase bilateral hip flexion to 110 degrees  Increase bilateral hamstring length to -10 degrees   Increase lower ext strength to 5/5   Decrease pain to 5/10     Long Term Goals: 6 weeks   Be able to stoop and bend pain free  Decrease pain to 2/10  Return to work with pain less than 2/10     Plan   Plan of care Certification: 6/3/2022 to 7/2/2022.     Outpatient Physical Therapy 2 times weekly for 4 weeks to include the following interventions: Patient Education and Therapeutic Exercise, modalities as needed        Nano Barney, PTA  6/8/2022

## 2022-06-13 ENCOUNTER — CLINICAL SUPPORT (OUTPATIENT)
Dept: REHABILITATION | Facility: HOSPITAL | Age: 47
End: 2022-06-13
Payer: COMMERCIAL

## 2022-06-13 DIAGNOSIS — M54.16 LUMBAR RADICULOPATHY: Primary | ICD-10-CM

## 2022-06-13 DIAGNOSIS — M25.69 DECREASED RANGE OF MOTION OF TRUNK AND BACK: ICD-10-CM

## 2022-06-13 PROCEDURE — 97110 THERAPEUTIC EXERCISES: CPT | Mod: PN,CQ

## 2022-06-13 PROCEDURE — 97014 ELECTRIC STIMULATION THERAPY: CPT | Mod: PN,CQ

## 2022-06-13 NOTE — PROGRESS NOTES
Physical Therapy Treatment Note     Name: Glendy Engle  Clinic Number: [id number]    Therapy Diagnosis:   Encounter Diagnoses   Name Primary?    Lumbar radiculopathy Yes    Decreased range of motion of trunk and back      Physician: Kelli Paez FNP    Visit Date: 6/13/2022    Physician Orders: PT Eval and Treat    Medical Diagnosis from Referral: lumbar radiculopathy   Evaluation Date: 6/3/2022  Authorization Period Expiration: 7/2/2022  Plan of Care Expiration: saritar approved 10 visits plus eval until 8/2/2022  Visit # / Visits authorized: 4/11  PTA VISIT # 2    Time In: 1056  Time Out: 1140  Total Billable Time: 44 minutes    Precautions: Standard     Plan of care reviewed with Mehdi Jain PT.   Subjective     Pt reports: My elbow is bothering me a lot today, back is a little  better.  She was compliant with home exercise program.     Pain: 5/10  Location: right back  Right si joint     Objective     Glendy received therapeutic exercises to develop strength, endurance, ROM, flexibility, posture and core stabilization for 29  minutes including:    Bike x 5 min  Slant board x 2 min   Bilateral hamstrings stretch on step x 5  Bilateral side bend stretch x 5  Hip adduction with bolster x 10  Bilateral double knee to chest x 5 reps  Bilateral single knee to chest x 5 reps   Bilateral piriformis stretch x 5 reps   physioball hamstring curls  X 20 reps  physioball trunk rotation x 20 reps     Range of motion     Hip flexion right     112      Left  112  Hamstring right     -22       Left -19    Glendy received the following supervised modalities after being cleared for contradictions: IFC Electrical Stimulation:  Glendy received IFC Electrical Stimulation for pain control applied to the low back . Pt received stimulation at 100  % scan at a frequency of 15 for 15 minutes. Glendy tolderated treatment well without any adverse effects.      Glendy received hot pack for 15 minutes to low back .    Home  Exercises Provided and Patient Education Provided     Education provided: cont home ex program    Written Home Exercises Provided: yes.  Exercises were reviewed and Glendy was able to demonstrate them prior to the end of the session.  Glendy demonstrated good  understanding of the education provided.     See EMR under Media for exercises provided prior visit.    Assessment     Pt voicing decreased pain 1/10 after treatment.  Glendy Is progressing well towards her goals.   Pt prognosis is Excellent.     Pt will continue to benefit from skilled outpatient physical therapy to address the deficits listed in the problem list box on initial evaluation, provide pt/family education and to maximize pt's level of independence in the home and community environment.     Pt's spiritual, cultural and educational needs considered and pt agreeable to plan of care and goals.     Anticipated barriers to physical therapy: decreased range of motion and muscle tightness     Anticipated Barriers for therapy: decreased flexibility and right si joint dysfunction        Goals:  Short Term Goals: 4 weeks   Pt will be independent with home ex program-met  Pt will increase bilateral hip flexion to 110 degrees  Increase bilateral hamstring length to -10 degrees   Increase lower ext strength to 5/5   Decrease pain to 5/10     Long Term Goals: 6 weeks   Be able to stoop and bend pain free  Decrease pain to 2/10  Return to work with pain less than 2/10     Plan   Plan of care Certification: 6/3/2022 to 7/2/2022.     Outpatient Physical Therapy 2 times weekly for 4 weeks to include the following interventions: Patient Education and Therapeutic Exercise, modalities as needed        Nano Barney, PTA  6/13/2022

## 2022-06-14 ENCOUNTER — OFFICE VISIT (OUTPATIENT)
Dept: FAMILY MEDICINE | Facility: CLINIC | Age: 47
End: 2022-06-14
Payer: COMMERCIAL

## 2022-06-14 VITALS
BODY MASS INDEX: 44.12 KG/M2 | WEIGHT: 249 LBS | DIASTOLIC BLOOD PRESSURE: 62 MMHG | RESPIRATION RATE: 18 BRPM | HEIGHT: 63 IN | TEMPERATURE: 98 F | SYSTOLIC BLOOD PRESSURE: 117 MMHG | HEART RATE: 76 BPM | OXYGEN SATURATION: 100 %

## 2022-06-14 DIAGNOSIS — M77.12 LATERAL EPICONDYLITIS OF LEFT ELBOW: Primary | ICD-10-CM

## 2022-06-14 PROCEDURE — 3074F PR MOST RECENT SYSTOLIC BLOOD PRESSURE < 130 MM HG: ICD-10-PCS | Mod: CPTII,,, | Performed by: NURSE PRACTITIONER

## 2022-06-14 PROCEDURE — 3008F BODY MASS INDEX DOCD: CPT | Mod: CPTII,,, | Performed by: NURSE PRACTITIONER

## 2022-06-14 PROCEDURE — 3078F PR MOST RECENT DIASTOLIC BLOOD PRESSURE < 80 MM HG: ICD-10-PCS | Mod: CPTII,,, | Performed by: NURSE PRACTITIONER

## 2022-06-14 PROCEDURE — 1159F MED LIST DOCD IN RCRD: CPT | Mod: CPTII,,, | Performed by: NURSE PRACTITIONER

## 2022-06-14 PROCEDURE — 3078F DIAST BP <80 MM HG: CPT | Mod: CPTII,,, | Performed by: NURSE PRACTITIONER

## 2022-06-14 PROCEDURE — 1159F PR MEDICATION LIST DOCUMENTED IN MEDICAL RECORD: ICD-10-PCS | Mod: CPTII,,, | Performed by: NURSE PRACTITIONER

## 2022-06-14 PROCEDURE — 99213 OFFICE O/P EST LOW 20 MIN: CPT | Mod: ,,, | Performed by: NURSE PRACTITIONER

## 2022-06-14 PROCEDURE — 99213 PR OFFICE/OUTPT VISIT, EST, LEVL III, 20-29 MIN: ICD-10-PCS | Mod: ,,, | Performed by: NURSE PRACTITIONER

## 2022-06-14 PROCEDURE — 3074F SYST BP LT 130 MM HG: CPT | Mod: CPTII,,, | Performed by: NURSE PRACTITIONER

## 2022-06-14 PROCEDURE — 3008F PR BODY MASS INDEX (BMI) DOCUMENTED: ICD-10-PCS | Mod: CPTII,,, | Performed by: NURSE PRACTITIONER

## 2022-06-14 RX ORDER — PREDNISONE 10 MG/1
10 TABLET ORAL DAILY
Qty: 5 TABLET | Refills: 0 | Status: SHIPPED | OUTPATIENT
Start: 2022-06-14 | End: 2022-08-05 | Stop reason: ALTCHOICE

## 2022-06-14 NOTE — PROGRESS NOTES
SUNI Cruz   [facility]  [id number]   [place] [address]     PATIENT NAME: Glendy Engle  : 1975  DATE: 22  MRN: [id number]      Billing Provider: SUNI Cruz  Level of Service:   Patient PCP Information     Provider PCP Type    SUNI Cruz General          Reason for Visit / Chief Complaint: Elbow Pain       Update PCP  Update Chief Complaint         History of Present Illness / Problem Focused Workflow     Glendy Engle is a 47 y.o. 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 She has not tried ice packs.  She is leaving tomorrow on a train trip to [place] to attend . .   She has diabetes and checking glucose 1-2 times daily and ranged 119-129 with rare elevation.  She has not had steroid injection in several months.   She is not able to take NSAIDs due to GI.      Review of Systems     Review of Systems   Musculoskeletal:        Left elbow  Tender to palp  At lateral epicondyle with pain increased with internal and external rotation.        Medical / Social / Family History     Past Medical History:   Diagnosis Date    Abnormal pulmonary function test 2019    Abnormal radiograph     Asthma     GERD (gastroesophageal reflux disease)     Hypertension     SHERLYN on CPAP     Thyroid nodule        Past Surgical History:   Procedure Laterality Date    CHOLECYSTECTOMY      COLONOSCOPY      ESOPHAGOGASTRODUODENOSCOPY      HYSTERECTOMY      LEG SURGERY      vein surgery on right leg    REDUCTION OF BOTH BREASTS      VASCULAR SURGERY      Rt GSV Laser Ablation Phlebectomy: Dr. Sergey Esteves  2017       Social History  Ms.  reports that she has never smoked. She has never used smokeless tobacco. She reports previous alcohol use. She reports that she  does not use drugs.    Family History  Ms.'s family history includes Cancer in her father and maternal aunt; Diabetes in her maternal grandmother and paternal grandmother; Heart disease in her mother and paternal grandfather; Heart failure in her mother; Hypertension in her mother.    Medications and Allergies     Medications  Outpatient Medications Marked as Taking for the 6/14/22 encounter (Office Visit) with SUNI Cruz   Medication Sig Dispense Refill    albuterol (PROVENTIL/VENTOLIN HFA) 90 mcg/actuation inhaler Inhale 2 puffs into the lungs every 6 (six) hours as needed for Wheezing. Rescue 18 g 5    atorvastatin (LIPITOR) 20 MG tablet Take 1 tablet (20 mg total) by mouth once daily. 90 tablet 3    budesonide-formoterol 160-4.5 mcg (SYMBICORT) 160-4.5 mcg/actuation HFAA Inhale 2 puffs into the lungs every 12 (twelve) hours. Controller      cyclobenzaprine (FLEXERIL) 10 MG tablet Take 10 mg by mouth 3 (three) times daily as needed for Muscle spasms.      famotidine (PEPCID) 40 MG tablet Take 40 mg by mouth 2 (two) times daily.      fexofenadine (ALLEGRA) 180 MG tablet Take 180 mg by mouth once daily.      furosemide (LASIX) 20 MG tablet richar 1-2 tabs daily as needed for swelling.  Take potassium with Lasix/tm 60 tablet 11    HYDROcodone-acetaminophen (NORCO)  mg per tablet Take 1 tablet by mouth 2 (two) times daily.      lisinopriL (PRINIVIL,ZESTRIL) 20 MG tablet Take 1 tablet (20 mg total) by mouth once daily. 90 tablet 3    metFORMIN (GLUCOPHAGE) 500 MG tablet Take 1 tablet (500 mg total) by mouth 2 (two) times daily with meals. 180 tablet 3    pantoprazole (PROTONIX) 40 MG tablet Take 40 mg by mouth once daily.      paroxetine (PAXIL) 20 MG tablet Take 1 tablet (20 mg total) by mouth nightly. 90 tablet 1    phenazopyridine (PYRIDIUM) 100 MG tablet Take 100 mg by mouth 3 (three) times daily as needed for Pain.      potassium chloride (MICRO-K) 10 MEQ CpSR Take 20 mEq by mouth 2 (two)  "times daily. 10 mEq    2 caps   BID      sucralfate (CARAFATE) 1 gram tablet Take 1 g by mouth 2 (two) times daily. AM & HS       Current Facility-Administered Medications for the 6/14/22 encounter (Office Visit) with SUNI Cruz   Medication Dose Route Frequency Provider Last Rate Last Admin    albuterol inhaler 2 puff  2 puff Inhalation Q20 Min PRN SUNI Cruz        sodium chloride 0.9% flush 10 mL  10 mL Intravenous PRN SUNI Cruz           Allergies  Review of patient's allergies indicates:   Allergen Reactions    Bactrim [sulfamethoxazole-trimethoprim] Hives       Physical Examination     Vitals:    06/14/22 1419   BP: 117/62   BP Location: Left arm   Patient Position: Sitting   Pulse: 76   Resp: 18   Temp: 98.2 °F (36.8 °C)   SpO2: 100%   Weight: 112.9 kg (249 lb)   Height: 5' 3" (1.6 m)      Physical Exam  Cardiovascular:      Rate and Rhythm: Normal rate and regular rhythm.   Pulmonary:      Effort: Pulmonary effort is normal.      Breath sounds: Normal breath sounds.   Musculoskeletal:      Comments: Left elbow with  Mild redness noted and is very tender to palp over lateral epicondyle with pain increaed with range of motion   Skin:     General: Skin is dry.   Neurological:      Mental Status: She is oriented to person, place, and time.   Psychiatric:         Mood and Affect: Mood normal.          Assessment and Plan (including Health Maintenance)      Problem List  Smart Sets  Document Outside HM   :    Plan:         Health Maintenance Due   Topic Date Due    Hepatitis C Screening  Never done    HIV Screening  Never done       Problem List Items Addressed This Visit    None       There are no diagnoses linked to this encounter.   Health Maintenance Topics with due status: Not Due       Topic Last Completion Date    TETANUS VACCINE 05/30/2017    Colorectal Cancer Screening 07/08/2020    Lipid Panel 11/05/2021    Mammogram 11/15/2021       Procedures     Future Appointments   Date Time " Provider Department Center   6/15/2022 11:00 AM Nano Barney PTA [facility]   6/27/2022 11:00 AM Mehdi Jain PT [facility]   6/29/2022 11:00 AM Nano Barney PTA [facility]   7/5/2022 11:00 AM Nano Barney PTA [facility]   11/14/2022  9:00 AM SUNI Cruz [facility]   12/13/2022  9[facility]    4/6/2023  1:00 PM [facility] Alvarado MD [facility]        No follow-ups on file.     Signature:  SUNI Cruz    Date of encounter: 6/14/22

## 2022-06-15 ENCOUNTER — CLINICAL SUPPORT (OUTPATIENT)
Dept: REHABILITATION | Facility: HOSPITAL | Age: 47
End: 2022-06-15
Payer: COMMERCIAL

## 2022-06-15 DIAGNOSIS — M25.69 DECREASED RANGE OF MOTION OF TRUNK AND BACK: ICD-10-CM

## 2022-06-15 DIAGNOSIS — M54.16 LUMBAR RADICULOPATHY: Primary | ICD-10-CM

## 2022-06-15 PROCEDURE — 97110 THERAPEUTIC EXERCISES: CPT | Mod: PN,CQ

## 2022-06-15 PROCEDURE — 97014 ELECTRIC STIMULATION THERAPY: CPT | Mod: PN,CQ

## 2022-06-15 NOTE — PROGRESS NOTES
Physical Therapy Treatment Note     Name: Glendy Engle  Clinic Number: [id number]    Therapy Diagnosis:   Encounter Diagnoses   Name Primary?    Lumbar radiculopathy Yes    Decreased range of motion of trunk and back      Physician: Kelli Paez FNP    Visit Date: 6/15/2022    Physician Orders: PT Eval and Treat    Medical Diagnosis from Referral: lumbar radiculopathy   Evaluation Date: 6/3/2022  Authorization Period Expiration: 2022  Plan of Care Expiration: kendra approved 10 visits plus eval until 2022  Visit # / Visits authorized:   PTA VISIT # 3    Time In: 1020  Time Out: 1105  Total Billable Time: 45 minutes    Precautions: Standard     Plan of care reviewed with Mehdi Jain PT.   Subjective     Pt reports: I'm feeling better today. We're leaving for  out of town today so I'll be gone next week.  She was compliant with home exercise program.     Pain: 3/10  Location: right back  Right si joint     Objective     Glendy received therapeutic exercises to develop strength, endurance, ROM, flexibility, posture and core stabilization for 30  minutes including:    Bike x 5 min  Slant board x 2 min   Bilateral hamstrings stretch on step x 5  Bilateral side bend stretch x 5  Hip adduction bridging with bolster x 10  Bilateral double knee to chest x 5 reps  Bilateral single knee to chest x 5 reps   Bilateral piriformis stretch x 5 reps   physioball hamstring curls  X 20 reps  physioball trunk rotation x 20 reps     Range of motion     Hip flexion right     112      Left  112  Hamstring right     -20       Left -18    Glendy received the following supervised modalities after being cleared for contradictions: IFC Electrical Stimulation:  Glendy received IFC Electrical Stimulation for pain control applied to the low back . Pt received stimulation at 100  % scan at a frequency of 15 for 15 minutes. Glendy tolderated treatment well without any adverse effects.      Glendy received hot pack  for 15 minutes to low back .    Home Exercises Provided and Patient Education Provided     Education provided: cont home ex program    Written Home Exercises Provided: yes.  Exercises were reviewed and Glendy was able to demonstrate them prior to the end of the session.  Glendy demonstrated good  understanding of the education provided.     See EMR under Media for exercises provided prior visit.    Assessment     Pt voicing decreased pain 0/10 after treatment.  Glendy Is progressing well towards her goals.   Pt prognosis is Excellent.     Pt will continue to benefit from skilled outpatient physical therapy to address the deficits listed in the problem list box on initial evaluation, provide pt/family education and to maximize pt's level of independence in the home and community environment.     Pt's spiritual, cultural and educational needs considered and pt agreeable to plan of care and goals.    Anticipated Barriers for therapy: decreased flexibility and right si joint dysfunction        Goals:  Short Term Goals: 4 weeks   Pt will be independent with home ex program-met  Pt will increase bilateral hip flexion to 110 degrees-met  Increase bilateral hamstring length to -10 degrees   Increase lower ext strength to 5/5   Decrease pain to 5/10     Long Term Goals: 6 weeks   Be able to stoop and bend pain free  Decrease pain to 2/10  Return to work with pain less than 2/10     Plan   Plan of care Certification: 6/3/2022 to 7/2/2022.     Outpatient Physical Therapy 2 times weekly for 4 weeks to include the following interventions: Patient Education and Therapeutic Exercise, modalities as needed        Nano Barney, PTA  6/15/2022

## 2022-06-27 ENCOUNTER — CLINICAL SUPPORT (OUTPATIENT)
Dept: REHABILITATION | Facility: HOSPITAL | Age: 47
End: 2022-06-27
Payer: COMMERCIAL

## 2022-06-27 DIAGNOSIS — M25.69 DECREASED RANGE OF MOTION OF TRUNK AND BACK: ICD-10-CM

## 2022-06-27 DIAGNOSIS — M54.16 LUMBAR BACK PAIN WITH RADICULOPATHY AFFECTING RIGHT LOWER EXTREMITY: ICD-10-CM

## 2022-06-27 DIAGNOSIS — M54.16 LUMBAR RADICULOPATHY: Primary | ICD-10-CM

## 2022-06-27 PROCEDURE — 97110 THERAPEUTIC EXERCISES: CPT | Mod: PN

## 2022-06-27 PROCEDURE — 97035 APP MDLTY 1+ULTRASOUND EA 15: CPT | Mod: PN

## 2022-06-27 NOTE — PROGRESS NOTES
Physical Therapy Treatment Note     Name: Glendy Engle  Clinic Number: [id number]    Therapy Diagnosis:   Encounter Diagnoses   Name Primary?    Lumbar radiculopathy Yes    Decreased range of motion of trunk and back     Lumbar back pain with radiculopathy affecting right lower extremity      Physician: Kelli Paez FNP    Visit Date: 6/27/2022    Physician Orders: PT Eval and Treat    Medical Diagnosis from Referral: lumbar radiculopathy   Evaluation Date: 6/3/2022  Authorization Period Expiration: 7/2/2022  Plan of Care Expiration: ambetter approved 10 visits plus eval until 8/2/2022  Visit # / Visits authorized: 6/11  PTA VISIT #     Time In: 1050  Time Out: 1135  Total Billable Time: 45 minutes    Precautions: Standard     Plan of care reviewed with Mehdi Jain PT.   Subjective     Pt reports: I'm sore today. It was a long train ride back so I'm just tired from it  She was compliant with home exercise program.     Pain: 7/10  Location: right back  Right si joint     Objective     Glendy received therapeutic exercises to develop strength, endurance, ROM, flexibility, posture and core stabilization for 30  minutes including:    Bike x 5 min at level 2  Slant board x 2 min   Bilateral hamstrings stretch on step x 3  Bilateral side bend stretch x 5  Double Leg squats on Total gym x 15  Hip adduction bridging with bolster x 10  Bilateral double knee to chest x 5 reps  Bilateral single knee to chest x 5 reps   Bilateral piriformis stretch x 5 reps   physioball hamstring curls  X 20 reps  physioball trunk rotation x 20 reps     Range of motion     Hip flexion right     115      Left  112  Hamstring right     -25       Left -23    Glendy received the following supervised modalities after being cleared for contradictions: IFC Electrical Stimulation:  Glendy received IFC Electrical Stimulation for pain control applied to the low back . Pt received stimulation at 100  % scan at a frequency of 15 for 15 minutes.  Glendy tolderated treatment well without any adverse effects.      Glendy received hot pack for 15 minutes to low back .    Home Exercises Provided and Patient Education Provided     Education provided: cont home ex program    Written Home Exercises Provided: yes.  Exercises were reviewed and Glendy was able to demonstrate them prior to the end of the session.  Glendy demonstrated good  understanding of the education provided.     See EMR under Media for exercises provided prior visit.    Assessment     Pt is improving with her hip flexion. Pt reports a pain of 4/10 after treatment.   Glendy Is progressing well towards her goals.   Pt prognosis is Excellent.     Pt will continue to benefit from skilled outpatient physical therapy to address the deficits listed in the problem list box on initial evaluation, provide pt/family education and to maximize pt's level of independence in the home and community environment.     Pt's spiritual, cultural and educational needs considered and pt agreeable to plan of care and goals.    Anticipated Barriers for therapy: decreased flexibility and right si joint dysfunction        Goals:  Short Term Goals: 4 weeks   Pt will be independent with home ex program-met  Pt will increase bilateral hip flexion to 110 degrees-met  Increase bilateral hamstring length to -10 degrees   Increase lower ext strength to 5/5   Decrease pain to 5/10     Long Term Goals: 6 weeks   Be able to stoop and bend pain free  Decrease pain to 2/10  Return to work with pain less than 2/10     Plan   Plan of care Certification: 6/3/2022 to 7/2/2022.     Outpatient Physical Therapy 2 times weekly for 4 weeks to include the following interventions: Patient Education and Therapeutic Exercise, modalities as needed        Mehdi Jain, PT  6/27/2022

## 2022-06-29 ENCOUNTER — CLINICAL SUPPORT (OUTPATIENT)
Dept: REHABILITATION | Facility: HOSPITAL | Age: 47
End: 2022-06-29
Payer: COMMERCIAL

## 2022-06-29 DIAGNOSIS — M25.69 DECREASED RANGE OF MOTION OF TRUNK AND BACK: ICD-10-CM

## 2022-06-29 DIAGNOSIS — M54.16 LUMBAR RADICULOPATHY: Primary | ICD-10-CM

## 2022-06-29 PROCEDURE — 97014 ELECTRIC STIMULATION THERAPY: CPT | Mod: PN,CQ

## 2022-06-29 PROCEDURE — 97110 THERAPEUTIC EXERCISES: CPT | Mod: PN,CQ

## 2022-06-29 NOTE — PROGRESS NOTES
Physical Therapy Treatment Note     Name: Glendy Engle  Clinic Number: [id number]    Therapy Diagnosis:   Encounter Diagnoses   Name Primary?    Lumbar radiculopathy Yes    Decreased range of motion of trunk and back      Physician: Kelli Paez FNP    Visit Date: 6/29/2022    Physician Orders: PT Eval and Treat    Medical Diagnosis from Referral: lumbar radiculopathy   Evaluation Date: 6/3/2022  Authorization Period Expiration: 7/2/2022  Plan of Care Expiration: kendra approved 10 visits plus eval until 8/2/2022  Visit # / Visits authorized: 7/11  PTA VISIT # 1    Time In: 1055  Time Out: 1145  Total Billable Time: 50 minutes    Precautions: Standard     Plan of care reviewed with Mehdi Jain PT.   Subjective     Pt reports: I'm a little better today  She was compliant with home exercise program.     Pain: 4/10  Location: right back  Right si joint     Objective     Glendy received therapeutic exercises to develop strength, endurance, ROM, flexibility, posture and core stabilization for 35  minutes including:    Bike x 5 min at level 2  Slant board x 2 min   Bilateral ITB stretch x 5  Bilateral hamstrings stretch on step x 3  Bilateral side bend stretch x 5  Double Leg squats on Total gym x 15  Double support calf raises total gym x 10  Hip adduction bridging with bolster x 10  Bilateral single knee to chest x 5 reps   Bilateral piriformis stretch x 15rep  Swissball trunk rotation x 15      Range of motion     Hip flexion right     115      Left  113  Hamstring right     -24       Left -23    Glendy received the following supervised modalities after being cleared for contradictions: IFC Electrical Stimulation:  Glendy received IFC Electrical Stimulation for pain control applied to the low back . Pt received stimulation at 100  % scan at a frequency of 16 for 15 minutes. Glendy tolderated treatment well without any adverse effects.      Glendy received hot pack for 15 minutes to low back .    Home  Exercises Provided and Patient Education Provided     Education provided: cont home ex program    Written Home Exercises Provided: yes.  Exercises were reviewed and Glendy was able to demonstrate them prior to the end of the session.  Glendy demonstrated good  understanding of the education provided.     See EMR under Media for exercises provided prior visit.    Assessment     Pt instructed to include iliotibial band stretch with home exercise program.  Glendy Is progressing well towards her goals.   Pt prognosis is Excellent.     Pt will continue to benefit from skilled outpatient physical therapy to address the deficits listed in the problem list box on initial evaluation, provide pt/family education and to maximize pt's level of independence in the home and community environment.     Pt's spiritual, cultural and educational needs considered and pt agreeable to plan of care and goals.    Anticipated Barriers for therapy: decreased flexibility and right si joint dysfunction        Goals:  Short Term Goals: 4 weeks   Pt will be independent with home ex program-met  Pt will increase bilateral hip flexion to 110 degrees-met  Increase bilateral hamstring length to -10 degrees   Increase lower ext strength to 5/5   Decrease pain to 5/10     Long Term Goals: 6 weeks   Be able to stoop and bend pain free  Decrease pain to 2/10  Return to work with pain less than 2/10     Plan   Plan of care Certification: 6/3/2022 to 7/2/2022.     Outpatient Physical Therapy 2 times weekly for 4 weeks to include the following interventions: Patient Education and Therapeutic Exercise, modalities as needed    Nano Barney, PTA  6/29/2022

## 2022-07-05 ENCOUNTER — CLINICAL SUPPORT (OUTPATIENT)
Dept: REHABILITATION | Facility: HOSPITAL | Age: 47
End: 2022-07-05
Payer: COMMERCIAL

## 2022-07-05 DIAGNOSIS — M25.69 DECREASED RANGE OF MOTION OF TRUNK AND BACK: ICD-10-CM

## 2022-07-05 DIAGNOSIS — M54.16 LUMBAR RADICULOPATHY: Primary | ICD-10-CM

## 2022-07-05 PROCEDURE — 97014 ELECTRIC STIMULATION THERAPY: CPT | Mod: PN,CQ

## 2022-07-05 PROCEDURE — 97110 THERAPEUTIC EXERCISES: CPT | Mod: PN,CQ

## 2022-07-05 NOTE — PROGRESS NOTES
Physical Therapy Treatment Note     Name: Glendy Engle  Clinic Number: [id number]    Therapy Diagnosis:   Encounter Diagnoses   Name Primary?    Lumbar radiculopathy Yes    Decreased range of motion of trunk and back      Physician: Kelli Paez FNP    Visit Date: 7/5/2022    Physician Orders: PT Eval and Treat    Medical Diagnosis from Referral: lumbar radiculopathy   Evaluation Date: 6/3/2022  Authorization Period Expiration: 7/2/2022  Plan of Care Expiration: kendra approved 10 visits plus eval until 8/2/2022  Visit # / Visits authorized: 8/11  PTA VISIT # 2    Time In: 1055  Time Out: 1145  Total Billable Time: 50 minutes    Precautions: Standard     Plan of care reviewed with Mehdi Jain PT.   Subjective     Pt reports: I'm ok today  She was compliant with home exercise program.     Pain: 4/10  Location: right back  Right si joint     Objective     Glendy received therapeutic exercises to develop strength, endurance, ROM, flexibility, posture and core stabilization for 35  minutes including:    Bike x 5 min at level 2  Slant board x 2 min   Bilateral ITB stretch x 5  Bilateral hamstrings stretch on step x 3  Bilateral side bend stretch x 5  Double Leg squats on Total gym x 15  Double support calf raises total gym x 10  Hip adduction bridging with bolster x 10  Bilateral single knee to chest x 5 reps   Bilateral piriformis stretch x 15rep  Swissball trunk rotation x 15    Range of motion     Hip flexion right     115      Left  115  Hamstring right     -22      Left -21    Glendy received the following supervised modalities after being cleared for contradictions: IFC Electrical Stimulation:  Glendy received IFC Electrical Stimulation for pain control applied to the low back . Pt received stimulation at 100  % scan at a frequency of 16 for 15 minutes. Glendy tolderated treatment well without any adverse effects.      Glendy received hot pack for 15 minutes to low back .    Home Exercises Provided  and Patient Education Provided     Education provided: cont home ex program    Written Home Exercises Provided: yes.  Exercises were reviewed and Glendy was able to demonstrate them prior to the end of the session.  Glendy demonstrated good  understanding of the education provided.     See EMR under Media for exercises provided prior visit.    Assessment     Pt requires instruction to perform ITB stretch correctly, patient voicing decreased pain 0/10.  Glendy Is progressing well towards her goals.   Pt prognosis is Excellent.     Pt will continue to benefit from skilled outpatient physical therapy to address the deficits listed in the problem list box on initial evaluation, provide pt/family education and to maximize pt's level of independence in the home and community environment.     Pt's spiritual, cultural and educational needs considered and pt agreeable to plan of care and goals.    Anticipated Barriers for therapy: compliance with home exercise program        Goals:  Short Term Goals: 4 weeks   Pt will be independent with home ex program-met  Pt will increase bilateral hip flexion to 110 degrees-met  Increase bilateral hamstring length to -10 degrees   Increase lower ext strength to 5/5   Decrease pain to 5/10-met     Long Term Goals: 6 weeks   Be able to stoop and bend pain free  Decrease pain to 2/10  Return to work with pain less than 2/10     Plan   Plan of care Certification: 6/3/2022 to 7/2/2022.     Outpatient Physical Therapy 2 times weekly for 4 weeks to include the following interventions: Patient Education and Therapeutic Exercise, modalities as needed    Nano Barney, PTA  7/5/2022

## 2022-07-05 NOTE — PLAN OF CARE
Physical Therapy Treatment Note      Name: Glendy Engle  Clinic Number: [id number]     Therapy Diagnosis:        Encounter Diagnoses   Name Primary?    Lumbar radiculopathy Yes    Decreased range of motion of trunk and back      Lumbar back pain with radiculopathy affecting right lower extremity        Physician: Kelli Paez FNP     Visit Date: 6/27/2022     Physician Orders: PT Eval and Treat    Medical Diagnosis from Referral: lumbar radiculopathy   Evaluation Date: 6/3/2022  Authorization Period Expiration: 7/2/2022  Plan of Care Expiration: ambetter approved 10 visits plus eval until 8/2/2022  Visit # / Visits authorized: 6/11  PTA VISIT #      Time In: 1050  Time Out: 1135  Total Billable Time: 45 minutes     Precautions: Standard     Plan of care reviewed with Mehdi Jain PT.   Subjective      Pt reports: I'm sore today. It was a long train ride back so I'm just tired from it  She was compliant with home exercise program.     Pain: 7/10  Location: right back  Right si joint      Objective      Glendy received therapeutic exercises to develop strength, endurance, ROM, flexibility, posture and core stabilization for 30  minutes including:     Bike x 5 min at level 2  Slant board x 2 min   Bilateral hamstrings stretch on step x 3  Bilateral side bend stretch x 5  Double Leg squats on Total gym x 15  Hip adduction bridging with bolster x 10  Bilateral double knee to chest x 5 reps  Bilateral single knee to chest x 5 reps   Bilateral piriformis stretch x 5 reps   physioball hamstring curls  X 20 reps  physioball trunk rotation x 20 reps      Range of motion     Hip flexion right     115      Left  112  Hamstring right     -25       Left -23     Glendy received the following supervised modalities after being cleared for contradictions: IFC Electrical Stimulation:  Glendy received IFC Electrical Stimulation for pain control applied to the low back . Pt received stimulation at 100  % scan at a frequency of  15 for 15 minutes. Glendy tolderated treatment well without any adverse effects.       Glendy received hot pack for 15 minutes to low back .     Home Exercises Provided and Patient Education Provided      Education provided: cont home ex program     Written Home Exercises Provided: yes.  Exercises were reviewed and Glendy was able to demonstrate them prior to the end of the session.  Glendy demonstrated good  understanding of the education provided.      See EMR under Media for exercises provided prior visit.     Assessment      Pt is improving with her hip flexion. Pt reports a pain of 4/10 after treatment.   Glendy Is progressing well towards her goals.   Pt prognosis is Excellent.      Pt will continue to benefit from skilled outpatient physical therapy to address the deficits listed in the problem list box on initial evaluation, provide pt/family education and to maximize pt's level of independence in the home and community environment.      Pt's spiritual, cultural and educational needs considered and pt agreeable to plan of care and goals.     Anticipated Barriers for therapy: decreased flexibility and right si joint dysfunction         Goals:  Short Term Goals: 4 weeks   Pt will be independent with home ex program-met  Pt will increase bilateral hip flexion to 110 degrees-met  Increase bilateral hamstring length to -10 degrees   Increase lower ext strength to 5/5   Decrease pain to 5/10     Long Term Goals: 6 weeks   Be able to stoop and bend pain free  Decrease pain to 2/10  Return to work with pain less than 2/10     Reasons for Recertification of Therapy: Cont PT     Plan     Updated Certification Period: 7/5/2022 to 8/4/2022  Recommended Treatment Plan: 2 times per week for 2 weeks: Patient Education, Therapeutic Exercise and modalities as needed   Other Recommendations: cont PT    Mehdi Jain, PT  7/5/2022      I CERTIFY THE NEED FOR THESE SERVICES FURNISHED UNDER THIS PLAN OF TREATMENT AND WHILE  UNDER  CARE.    Physician's comments:      Physician's Signature: ___________________________________________________

## 2022-07-07 ENCOUNTER — CLINICAL SUPPORT (OUTPATIENT)
Dept: REHABILITATION | Facility: HOSPITAL | Age: 47
End: 2022-07-07
Payer: COMMERCIAL

## 2022-07-07 DIAGNOSIS — M54.16 LUMBAR RADICULOPATHY: Primary | ICD-10-CM

## 2022-07-07 DIAGNOSIS — M25.69 DECREASED RANGE OF MOTION OF TRUNK AND BACK: ICD-10-CM

## 2022-07-07 PROCEDURE — 97110 THERAPEUTIC EXERCISES: CPT | Mod: PN,CQ

## 2022-07-07 PROCEDURE — 97014 ELECTRIC STIMULATION THERAPY: CPT | Mod: PN,CQ

## 2022-07-07 NOTE — PROGRESS NOTES
Physical Therapy Treatment Note     Name: Glendy Engle  Clinic Number: [id number]    Therapy Diagnosis:   Encounter Diagnoses   Name Primary?    Lumbar radiculopathy Yes    Decreased range of motion of trunk and back      Physician: Kelli Paez FNP    Visit Date: 7/7/2022    Physician Orders: PT Eval and Treat    Medical Diagnosis from Referral: lumbar radiculopathy   Evaluation Date: 6/3/2022  Authorization Period Expiration: 7/2/2022  Plan of Care Expiration: saritar approved 10 visits plus eval until 8/2/2022  Visit # / Visits authorized: 10/11  PTA VISIT # 4    Time In: 1053  Time Out: 1143  Total Billable Time: 50 minutes    Precautions: Standard     Plan of care reviewed with Mehdi Jain PT.   Subjective     Pt reports: I didn't sleep good last night  She was compliant with home exercise program.     Pain: 7/10  Location: right back  Right si joint     Objective     Glendy received therapeutic exercises to develop strength, endurance, ROM, flexibility, posture and core stabilization for 35  minutes including:    Bike x 5 min at level 2  Slant board x 2 min   Bilateral ITB stretch x 5  Bilateral hamstrings stretch on step x 3  Bilateral side bend stretch x 5  Double Leg squats on Total gym x 15  Double support calf raises total gym x 10  Hip adduction bridging with bolster x 10  Bilateral single knee to chest x 5 reps   Bilateral piriformis stretch x 15rep  Swissball trunk rotation x 15  Bilateral figure 4 hip external rotation stretch x 5    Range of motion     Hip flexion right     115      Left  115  Hamstring right     -22      Left 5 -21    Glendy received the following supervised modalities after being cleared for contradictions: IFC Electrical Stimulation:  Glendy received IFC Electrical Stimulation for pain control applied to the low back . Pt received stimulation at 100  % scan at a frequency of 16 for 15 minutes. Glendy tolderated treatment well without any adverse effects.       Glendy received hot pack for 15 minutes to low back .    Home Exercises Provided and Patient Education Provided     Education provided: cont home ex program    Written Home Exercises Provided: yes.  Exercises were reviewed and Glendy was able to demonstrate them prior to the end of the session.  Glendy demonstrated good  understanding of the education provided.     See EMR under Media for exercises provided prior visit.    Assessment     Pt has tight hip external rotation, instructed to include figure 4 stretch with home exercise program   Glendy Is progressing well towards her goals.   Pt prognosis is Excellent.     Pt will continue to benefit from skilled outpatient physical therapy to address the deficits listed in the problem list box on initial evaluation, provide pt/family education and to maximize pt's level of independence in the home and community environment.     Pt's spiritual, cultural and educational needs considered and pt agreeable to plan of care and goals.    Anticipated Barriers for therapy: compliance with home exercise program        Goals:  Short Term Goals: 4 weeks   Pt will be independent with home ex program-met  Pt will increase bilateral hip flexion to 110 degrees-met  Increase bilateral hamstring length to -10 degrees   Increase lower ext strength to 5/5   Decrease pain to 5/10-met     Long Term Goals: 6 weeks   Be able to stoop and bend pain free  Decrease pain to 2/10  Return to work with pain less than 2/10     Plan   Plan of care Certification: 7/5/22-8/4/22     Outpatient Physical Therapy 2 times weekly for 4 weeks to include the following interventions: Patient Education and Therapeutic Exercise, modalities as needed    Nano Barney, PTA  7/7/2022

## 2022-07-11 ENCOUNTER — OFFICE VISIT (OUTPATIENT)
Dept: FAMILY MEDICINE | Facility: CLINIC | Age: 47
End: 2022-07-11
Payer: COMMERCIAL

## 2022-07-11 VITALS
OXYGEN SATURATION: 98 % | DIASTOLIC BLOOD PRESSURE: 69 MMHG | BODY MASS INDEX: 44.11 KG/M2 | HEART RATE: 78 BPM | SYSTOLIC BLOOD PRESSURE: 124 MMHG | TEMPERATURE: 99 F | HEIGHT: 63 IN | RESPIRATION RATE: 18 BRPM

## 2022-07-11 DIAGNOSIS — U07.1 COVID-19 WITH MULTIPLE COMORBIDITIES: ICD-10-CM

## 2022-07-11 DIAGNOSIS — R05.9 COUGHING: Primary | ICD-10-CM

## 2022-07-11 LAB
CTP QC/QA: YES
SARS-COV-2 AG RESP QL IA.RAPID: POSITIVE

## 2022-07-11 PROCEDURE — 87426 SARS CORONAVIRUS 2 ANTIGEN POCT: ICD-10-PCS | Mod: QW,,, | Performed by: NURSE PRACTITIONER

## 2022-07-11 PROCEDURE — 3074F PR MOST RECENT SYSTOLIC BLOOD PRESSURE < 130 MM HG: ICD-10-PCS | Mod: CPTII,,, | Performed by: NURSE PRACTITIONER

## 2022-07-11 PROCEDURE — 1159F MED LIST DOCD IN RCRD: CPT | Mod: CPTII,,, | Performed by: NURSE PRACTITIONER

## 2022-07-11 PROCEDURE — 3008F BODY MASS INDEX DOCD: CPT | Mod: CPTII,,, | Performed by: NURSE PRACTITIONER

## 2022-07-11 PROCEDURE — 3008F PR BODY MASS INDEX (BMI) DOCUMENTED: ICD-10-PCS | Mod: CPTII,,, | Performed by: NURSE PRACTITIONER

## 2022-07-11 PROCEDURE — 3078F PR MOST RECENT DIASTOLIC BLOOD PRESSURE < 80 MM HG: ICD-10-PCS | Mod: CPTII,,, | Performed by: NURSE PRACTITIONER

## 2022-07-11 PROCEDURE — 3074F SYST BP LT 130 MM HG: CPT | Mod: CPTII,,, | Performed by: NURSE PRACTITIONER

## 2022-07-11 PROCEDURE — 87426 SARSCOV CORONAVIRUS AG IA: CPT | Mod: QW,,, | Performed by: NURSE PRACTITIONER

## 2022-07-11 PROCEDURE — 3078F DIAST BP <80 MM HG: CPT | Mod: CPTII,,, | Performed by: NURSE PRACTITIONER

## 2022-07-11 PROCEDURE — 1159F PR MEDICATION LIST DOCUMENTED IN MEDICAL RECORD: ICD-10-PCS | Mod: CPTII,,, | Performed by: NURSE PRACTITIONER

## 2022-07-11 PROCEDURE — 99213 PR OFFICE/OUTPT VISIT, EST, LEVL III, 20-29 MIN: ICD-10-PCS | Mod: ,,, | Performed by: NURSE PRACTITIONER

## 2022-07-11 PROCEDURE — 99213 OFFICE O/P EST LOW 20 MIN: CPT | Mod: ,,, | Performed by: NURSE PRACTITIONER

## 2022-07-11 NOTE — PROGRESS NOTES
SUNI Cruz   [facility]  [address] [address]   [place] [address]     PATIENT NAME: Glendy Engle  : 1975  DATE: 22  MRN: [id number]      Billing Provider: SUNI Cruz  Level of Service:   Patient PCP Information     Provider PCP Type    SUNI Cruz General          Reason for Visit / Chief Complaint: Fever, Cough, Sore Throat, and Headache (Fever, dry cough, sore throat and H/A began suddenly this past Thursday)       Update PCP  Update Chief Complaint         History of Present Illness / Problem Focused Workflow     Glendy Engle is a 47 y.o. female presents to the clinic  With 2 day history of sinus congestion sore throat and cough, headache, with intermittent fever. NO wheezing  Noted.  Her  had same symptoms  A few days prior to her onset of symptoms.     Visit conducted in patients car due to covid with limited exam        Review of Systems     Review of Systems   Constitutional: Positive for fever. Negative for fatigue.   HENT: Positive for nasal congestion and sore throat.    Respiratory: Negative for cough, chest tightness and shortness of breath.    Cardiovascular: Negative for chest pain, palpitations and leg swelling.   Gastrointestinal: Negative for nausea, vomiting and reflux.   Neurological: Negative for weakness and memory loss.   Psychiatric/Behavioral: Negative for confusion and sleep disturbance.        Medical / Social / Family History     Past Medical History:   Diagnosis Date    Abnormal pulmonary function test 2019    Abnormal radiograph     Asthma     GERD (gastroesophageal reflux disease)     Hypertension     SHERLYN on CPAP     Thyroid nodule        Past Surgical History:   Procedure Laterality Date    CHOLECYSTECTOMY      COLONOSCOPY      ESOPHAGOGASTRODUODENOSCOPY      HYSTERECTOMY      LEG SURGERY      vein surgery on right leg    REDUCTION OF BOTH BREASTS      VASCULAR SURGERY      Rt GSV Laser Ablation Phlebectomy: Dr. Sergey Esteves   2017       Social History  Ms.  reports that she has never smoked. She has never used smokeless tobacco. She reports previous alcohol use. She reports that she does not use drugs.    Family History  Ms.'s family history includes Cancer in her father and maternal aunt; Diabetes in her maternal grandmother and paternal grandmother; Heart disease in her mother and paternal grandfather; Heart failure in her mother; Hypertension in her mother.    Medications and Allergies     Medications  Outpatient Medications Marked as Taking for the 7/11/22 encounter (Office Visit) with SUNI Cruz   Medication Sig Dispense Refill    albuterol (PROVENTIL/VENTOLIN HFA) 90 mcg/actuation inhaler Inhale 2 puffs into the lungs every 6 (six) hours as needed for Wheezing. Rescue 18 g 5    atorvastatin (LIPITOR) 20 MG tablet Take 1 tablet (20 mg total) by mouth once daily. 90 tablet 3    budesonide-formoterol 160-4.5 mcg (SYMBICORT) 160-4.5 mcg/actuation HFAA Inhale 2 puffs into the lungs every 12 (twelve) hours. Controller      cyclobenzaprine (FLEXERIL) 10 MG tablet Take 10 mg by mouth 3 (three) times daily as needed for Muscle spasms.      famotidine (PEPCID) 40 MG tablet Take 40 mg by mouth 2 (two) times daily.      fexofenadine (ALLEGRA) 180 MG tablet Take 180 mg by mouth once daily.      furosemide (LASIX) 20 MG tablet richar 1-2 tabs daily as needed for swelling.  Take potassium with Lasix/tm 60 tablet 11    HYDROcodone-acetaminophen (NORCO)  mg per tablet Take 1 tablet by mouth 2 (two) times daily.      lisinopriL (PRINIVIL,ZESTRIL) 20 MG tablet Take 1 tablet (20 mg total) by mouth once daily. 90 tablet 3    metFORMIN (GLUCOPHAGE) 500 MG tablet Take 1 tablet (500 mg total) by mouth 2 (two) times daily with meals. 180 tablet 3    paroxetine (PAXIL) 20 MG tablet Take 1 tablet (20 mg total) by mouth nightly. 90 tablet 1    phenazopyridine (PYRIDIUM) 100 MG tablet Take 100 mg by mouth 3 (three) times daily as needed  "for Pain.       Current Facility-Administered Medications for the 7/11/22 encounter (Office Visit) with NEETA CruzP   Medication Dose Route Frequency Provider Last Rate Last Admin    albuterol inhaler 2 puff  2 puff Inhalation Q20 Min PRN Kelli Philippe, FNP        sodium chloride 0.9% flush 10 mL  10 mL Intravenous PRN Kelli Philippe, FNP           Allergies  Review of patient's allergies indicates:   Allergen Reactions    Bactrim [sulfamethoxazole-trimethoprim] Hives       Physical Examination     Vitals:    07/11/22 0806   BP: 124/69   BP Location: Right arm   Patient Position: Sitting   BP Method: X-Large (Automatic)   Pulse: 78   Resp: 18   Temp: 98.5 °F (36.9 °C)   TempSrc: Oral   SpO2: 98%   Height: 5' 3" (1.6 m)      Physical Exam  Constitutional:       Appearance: Normal appearance.   Cardiovascular:      Rate and Rhythm: Normal rate and regular rhythm.      Pulses: Normal pulses.      Heart sounds: Normal heart sounds.   Pulmonary:      Effort: Pulmonary effort is normal.      Breath sounds: Normal breath sounds.   Lymphadenopathy:      Cervical:      Right cervical: No superficial, deep or posterior cervical adenopathy.     Left cervical: No superficial, deep or posterior cervical adenopathy.   Neurological:      Mental Status: She is alert and oriented to person, place, and time.          Assessment and Plan (including Health Maintenance)      Problem List  Smart Sets  Document Outside HM   :    Plan:  Pt advised she is covid positive and will need to self quarantine thru 7/13/22 and needs to wear her mask an additional 5 days to protect others.  Drink lots of fluids, cough/congestion meds as needed.  Follow up if  Worsening symptoms.        Health Maintenance Due   Topic Date Due    Hepatitis C Screening  Never done    Pneumococcal Vaccines (Age 0-64) (1 - PCV) Never done    HIV Screening  Never done       Problem List Items Addressed This Visit        Pulmonary    Coughing - Primary    Relevant " Orders    SARS Coronavirus 2 Antigen, POCT       ID    COVID-19 with multiple comorbidities        Coughing  -     SARS Coronavirus 2 Antigen, POCT    COVID-19 with multiple comorbidities       Health Maintenance Topics with due status: Not Due       Topic Last Completion Date    TETANUS VACCINE 05/30/2017    Colorectal Cancer Screening 07/08/2020    Influenza Vaccine 10/11/2021    Lipid Panel 11/05/2021    Mammogram 11/15/2021       Procedures     Future Appointments   Date Time Provider Department Center   7/12/2022 11:00 AM Nano Barney PTA [facility]   11/14/2022  9:00 AM SUNI Cruz [facility]ionesta   12/13/2022  9:00 AM [facility]    4/6/2023  1:00 PM Huog Alvarado MD OB  [facility]        No follow-ups on file.     Signature:  SUNI Cruz    Date of encounter: 7/11/22

## 2022-07-14 NOTE — PLAN OF CARE
Name: Glendy Engle  Clinic Number: [id number]     Therapy Diagnosis:        Encounter Diagnoses   Name Primary?    Lumbar radiculopathy Yes    Decreased range of motion of trunk and back        Physician: Kelli Paez FNP     Visit Date: 7/7/2022     Physician Orders: PT Eval and Treat    Medical Diagnosis from Referral: lumbar radiculopathy   Evaluation Date: 6/3/2022  Authorization Period Expiration: 7/2/2022  Plan of Care Expiration: saritar approved 10 visits plus eval until 8/2/2022  Visit # / Visits authorized: 10/11  PTA VISIT # 4     Time In: 1053  Time Out: 1143  Total Billable Time: 50 minutes     Precautions: Standard     Plan of care reviewed with Mehdi Jain PT.   Subjective      Pt reports: I didn't sleep good last night  She was compliant with home exercise program.     Pain: 7/10  Location: right back  Right si joint      Objective      Glendy received therapeutic exercises to develop strength, endurance, ROM, flexibility, posture and core stabilization for 35  minutes including:     Bike x 5 min at level 2  Slant board x 2 min   Bilateral ITB stretch x 5  Bilateral hamstrings stretch on step x 3  Bilateral side bend stretch x 5  Double Leg squats on Total gym x 15  Double support calf raises total gym x 10  Hip adduction bridging with bolster x 10  Bilateral single knee to chest x 5 reps   Bilateral piriformis stretch x 15rep  Swissball trunk rotation x 15  Bilateral figure 4 hip external rotation stretch x 5     Range of motion     Hip flexion right     115      Left  115  Hamstring right     -22      Left 5 -21     Glendy received the following supervised modalities after being cleared for contradictions: IFC Electrical Stimulation:  Glendy received IFC Electrical Stimulation for pain control applied to the low back . Pt received stimulation at 100  % scan at a frequency of 16 for 15 minutes. Glendy tolderated treatment well without any adverse effects.       Glendy received hot  pack for 15 minutes to low back .     Home Exercises Provided and Patient Education Provided      Education provided: cont home ex program     Written Home Exercises Provided: yes.  Exercises were reviewed and Glendy was able to demonstrate them prior to the end of the session.  Glendy demonstrated good  understanding of the education provided.      See EMR under Media for exercises provided prior visit.     Assessment      Pt has tight hip external rotation, instructed to include figure 4 stretch with home exercise program   Glendy Is progressing well towards her goals.   Pt prognosis is Excellent.      Pt will continue to benefit from skilled outpatient physical therapy to address the deficits listed in the problem list box on initial evaluation, provide pt/family education and to maximize pt's level of independence in the home and community environment.      Pt's spiritual, cultural and educational needs considered and pt agreeable to plan of care and goals.     Anticipated Barriers for therapy: compliance with home exercise program         Goals:  Short Term Goals: 4 weeks   Pt will be independent with home ex program-met  Pt will increase bilateral hip flexion to 110 degrees-met  Increase bilateral hamstring length to -10 degrees   Increase lower ext strength to 5/5   Decrease pain to 5/10-met     Long Term Goals: 6 weeks   Be able to stoop and bend pain free  Decrease pain to 2/10  Return to work with pain less than 2/10     Plan         Outpatient Therapy Discharge Summary     Name: Glendy Engle  Clinic Number: [id number]    Therapy Diagnosis:   Encounter Diagnoses   Name Primary?    Lumbar radiculopathy Yes    Decreased range of motion of trunk and back      Physician: Kelil Paez FNP            Assessment    Goals:  Pt was progressing with goals. Pt d/c secondary to insurance visits exhausted and covid.     Discharge reason: Patient has completed the physician's prescription and Patient has  completed allowable visits authorized by insurance    Plan   This patient is discharged from Physical Therapy.    Mehdi Jain, PT

## 2022-07-25 ENCOUNTER — CLINICAL SUPPORT (OUTPATIENT)
Dept: FAMILY MEDICINE | Facility: CLINIC | Age: 47
End: 2022-07-25
Payer: COMMERCIAL

## 2022-07-25 DIAGNOSIS — Z11.52 ENCOUNTER FOR SCREENING LABORATORY TESTING FOR SEVERE ACUTE RESPIRATORY SYNDROME CORONAVIRUS 2 (SARS-COV-2): Primary | ICD-10-CM

## 2022-07-25 LAB
CTP QC/QA: YES
SARS-COV-2 AG RESP QL IA.RAPID: NEGATIVE

## 2022-07-25 PROCEDURE — 87426 SARS CORONAVIRUS 2 ANTIGEN POCT: ICD-10-PCS | Mod: QW,,, | Performed by: NURSE PRACTITIONER

## 2022-07-25 PROCEDURE — 87426 SARSCOV CORONAVIRUS AG IA: CPT | Mod: QW,,, | Performed by: NURSE PRACTITIONER

## 2022-08-05 ENCOUNTER — TELEPHONE (OUTPATIENT)
Dept: FAMILY MEDICINE | Facility: CLINIC | Age: 47
End: 2022-08-05
Payer: COMMERCIAL

## 2022-08-05 DIAGNOSIS — K21.00 GASTROESOPHAGEAL REFLUX DISEASE WITH ESOPHAGITIS WITHOUT HEMORRHAGE: Primary | ICD-10-CM

## 2022-08-05 DIAGNOSIS — N95.1 HOT FLASHES DUE TO MENOPAUSE: ICD-10-CM

## 2022-08-05 RX ORDER — SUCRALFATE 1 G/1
1 TABLET ORAL 2 TIMES DAILY
Qty: 180 TABLET | Refills: 1 | Status: SHIPPED | OUTPATIENT
Start: 2022-08-05 | End: 2022-09-22 | Stop reason: SDUPTHER

## 2022-08-05 RX ORDER — PAROXETINE HYDROCHLORIDE 20 MG/1
20 TABLET, FILM COATED ORAL NIGHTLY
Qty: 90 TABLET | Refills: 1 | Status: SHIPPED | OUTPATIENT
Start: 2022-08-05 | End: 2023-01-26 | Stop reason: SDUPTHER

## 2022-09-19 ENCOUNTER — TELEPHONE (OUTPATIENT)
Dept: FAMILY MEDICINE | Facility: CLINIC | Age: 47
End: 2022-09-19
Payer: COMMERCIAL

## 2022-09-22 ENCOUNTER — OFFICE VISIT (OUTPATIENT)
Dept: FAMILY MEDICINE | Facility: CLINIC | Age: 47
End: 2022-09-22
Payer: COMMERCIAL

## 2022-09-22 VITALS
RESPIRATION RATE: 18 BRPM | DIASTOLIC BLOOD PRESSURE: 64 MMHG | TEMPERATURE: 98 F | WEIGHT: 247 LBS | SYSTOLIC BLOOD PRESSURE: 119 MMHG | OXYGEN SATURATION: 96 % | BODY MASS INDEX: 43.77 KG/M2 | HEART RATE: 81 BPM | HEIGHT: 63 IN

## 2022-09-22 DIAGNOSIS — R73.03 PREDIABETES: Chronic | ICD-10-CM

## 2022-09-22 DIAGNOSIS — Z11.59 ENCOUNTER FOR HEPATITIS C SCREENING TEST FOR LOW RISK PATIENT: ICD-10-CM

## 2022-09-22 DIAGNOSIS — Z12.31 ENCOUNTER FOR SCREENING MAMMOGRAM FOR MALIGNANT NEOPLASM OF BREAST: ICD-10-CM

## 2022-09-22 DIAGNOSIS — K21.00 GASTROESOPHAGEAL REFLUX DISEASE WITH ESOPHAGITIS WITHOUT HEMORRHAGE: Chronic | ICD-10-CM

## 2022-09-22 DIAGNOSIS — G47.33 OSA ON CPAP: Chronic | ICD-10-CM

## 2022-09-22 DIAGNOSIS — I10 PRIMARY HYPERTENSION: Primary | Chronic | ICD-10-CM

## 2022-09-22 PROCEDURE — 3078F PR MOST RECENT DIASTOLIC BLOOD PRESSURE < 80 MM HG: ICD-10-PCS | Mod: CPTII,,, | Performed by: NURSE PRACTITIONER

## 2022-09-22 PROCEDURE — 3074F SYST BP LT 130 MM HG: CPT | Mod: CPTII,,, | Performed by: NURSE PRACTITIONER

## 2022-09-22 PROCEDURE — 3008F PR BODY MASS INDEX (BMI) DOCUMENTED: ICD-10-PCS | Mod: CPTII,,, | Performed by: NURSE PRACTITIONER

## 2022-09-22 PROCEDURE — 3074F PR MOST RECENT SYSTOLIC BLOOD PRESSURE < 130 MM HG: ICD-10-PCS | Mod: CPTII,,, | Performed by: NURSE PRACTITIONER

## 2022-09-22 PROCEDURE — 99214 OFFICE O/P EST MOD 30 MIN: CPT | Mod: ,,, | Performed by: NURSE PRACTITIONER

## 2022-09-22 PROCEDURE — 99214 PR OFFICE/OUTPT VISIT, EST, LEVL IV, 30-39 MIN: ICD-10-PCS | Mod: ,,, | Performed by: NURSE PRACTITIONER

## 2022-09-22 PROCEDURE — 3078F DIAST BP <80 MM HG: CPT | Mod: CPTII,,, | Performed by: NURSE PRACTITIONER

## 2022-09-22 PROCEDURE — 3008F BODY MASS INDEX DOCD: CPT | Mod: CPTII,,, | Performed by: NURSE PRACTITIONER

## 2022-09-22 RX ORDER — SUCRALFATE 1 G/1
1 TABLET ORAL 2 TIMES DAILY
Qty: 180 TABLET | Refills: 1 | Status: SHIPPED | OUTPATIENT
Start: 2022-09-22 | End: 2023-03-08 | Stop reason: ALTCHOICE

## 2022-09-22 RX ORDER — SIMVASTATIN 40 MG/1
40 TABLET, FILM COATED ORAL NIGHTLY
COMMUNITY
Start: 2022-08-02 | End: 2023-08-14 | Stop reason: SDUPTHER

## 2022-09-22 NOTE — PROGRESS NOTES
SUNI Cruz   [facility]  [address]   [place] [address]     PATIENT NAME: Glendy Engle  : 1975  DATE: 22  MRN: [id number]      Billing Provider: SUNI Cruz  Level of Service:   Patient PCP Information       Provider PCP Type    SUNI Cruz General            Reason for Visit / Chief Complaint: Headache, Sore Throat, and Abdominal Pain (Has had the listed symptoms x 3 days)       Update PCP  Update Chief Complaint         History of Present Illness / Problem Focused Workflow     Glendy Engle is a 47 y.o. female presents to the clinic  with 2-3 day histroy of abdominal discomfort and diarrhea with headache , sore throat. No fever or chills.  She  has had a dose of antidiarrheal and is feeling  some better now.  She is not having much GERD, no vomiting but is nauseous.   She is scheduled for colonoscopy in [facility]    She has history of   prediabetes and last A1c 5.7 in 2021.  She is due fasting labs--last done in 2021      Review of Systems     Review of Systems   Constitutional:  Negative for fatigue and fever.   HENT:  Negative for nasal congestion and sore throat.    Respiratory:  Negative for cough, chest tightness and shortness of breath.    Cardiovascular:  Negative for chest pain, palpitations and leg swelling.   Gastrointestinal:  Positive for abdominal pain, diarrhea and nausea. Negative for vomiting and reflux.   Neurological:  Negative for weakness and memory loss.   Psychiatric/Behavioral:  Negative for confusion and sleep disturbance.       Medical / Social / Family History     Past Medical History:   Diagnosis Date    Abnormal pulmonary function test 2019    Abnormal radiograph     Asthma     GERD (gastroesophageal reflux disease)     Hypertension     SHERLYN on CPAP     Thyroid nodule        Past Surgical History:   Procedure Laterality Date    CHOLECYSTECTOMY      COLONOSCOPY      ESOPHAGOGASTRODUODENOSCOPY      HYSTERECTOMY      LEG  SURGERY      vein surgery on right leg    REDUCTION OF BOTH BREASTS      VASCULAR SURGERY      Rt GSV Laser Ablation Phlebectomy: Dr. Sergey Esteves  2017       Social History  Ms.  reports that she has never smoked. She has never used smokeless tobacco. She reports that she does not currently use alcohol. She reports that she does not use drugs.    Family History  Ms.'s family history includes Cancer in her father and maternal aunt; Diabetes in her maternal grandmother and paternal grandmother; Heart disease in her mother and paternal grandfather; Heart failure in her mother; Hypertension in her mother.    Medications and Allergies     Medications  Outpatient Medications Marked as Taking for the 9/22/22 encounter (Office Visit) with SUNI Cruz   Medication Sig Dispense Refill    albuterol (PROVENTIL/VENTOLIN HFA) 90 mcg/actuation inhaler Inhale 2 puffs into the lungs every 6 (six) hours as needed for Wheezing. Rescue 18 g 5    budesonide-formoterol 160-4.5 mcg (SYMBICORT) 160-4.5 mcg/actuation HFAA Inhale 2 puffs into the lungs every 12 (twelve) hours. Controller      cyclobenzaprine (FLEXERIL) 10 MG tablet Take 10 mg by mouth 3 (three) times daily as needed for Muscle spasms.      famotidine (PEPCID) 40 MG tablet Take 40 mg by mouth 2 (two) times daily.      fexofenadine (ALLEGRA) 180 MG tablet Take 180 mg by mouth once daily.      furosemide (LASIX) 20 MG tablet richar 1-2 tabs daily as needed for swelling.  Take potassium with Lasix/tm 60 tablet 11    HYDROcodone-acetaminophen (NORCO)  mg per tablet Take 1 tablet by mouth 2 (two) times daily.      lisinopriL (PRINIVIL,ZESTRIL) 20 MG tablet Take 1 tablet (20 mg total) by mouth once daily. 90 tablet 3    metFORMIN (GLUCOPHAGE) 500 MG tablet Take 1 tablet (500 mg total) by mouth 2 (two) times daily with meals. 180 tablet 3    pantoprazole (PROTONIX) 40 MG tablet Take 40 mg by mouth once daily.      paroxetine (PAXIL) 20 MG tablet Take 1 tablet (20 mg total) by  "mouth nightly. 90 tablet 1    potassium chloride (MICRO-K) 10 MEQ CpSR Take 20 mEq by mouth 2 (two) times daily. 10 mEq    2 caps   BID      simvastatin (ZOCOR) 40 MG tablet Take 40 mg by mouth every evening.       Current Facility-Administered Medications for the 9/22/22 encounter (Office Visit) with SUNI Cruz   Medication Dose Route Frequency Provider Last Rate Last Admin    albuterol inhaler 2 puff  2 puff Inhalation Q20 Min PRN SUNI Cruz        sodium chloride 0.9% flush 10 mL  10 mL Intravenous PRN SUNI Cruz           Allergies  Review of patient's allergies indicates:   Allergen Reactions    Bactrim [sulfamethoxazole-trimethoprim] Hives       Physical Examination     Vitals:    09/22/22 1408   BP: 119/64   BP Location: Left arm   Patient Position: Sitting   BP Method: X-Large (Automatic)   Pulse: 81   Resp: 18   Temp: 98 °F (36.7 °C)   TempSrc: Oral   SpO2: 96%   Weight: 112 kg (247 lb)   Height: 5' 3" (1.6 m)      Physical Exam     Assessment and Plan (including Health Maintenance)      Problem List  Smart Sets  Document Outside HM   :    Plan:         Health Maintenance Due   Topic Date Due    Hepatitis C Screening  Never done    Pneumococcal Vaccines (Age 0-64) (1 - PCV) Never done    HIV Screening  Never done    Influenza Vaccine (1) 09/01/2022    Mammogram  11/15/2022       Problem List Items Addressed This Visit    None    There are no diagnoses linked to this encounter.   Health Maintenance Topics with due status: Not Due       Topic Last Completion Date    TETANUS VACCINE 05/30/2017    Colorectal Cancer Screening 07/08/2020    Lipid Panel 11/05/2021       Procedures     Future Appointments   Date Time Provider Department Center   11/14/2022  9:00 AM SUNI Cruz [facility] [place]   12[facility]13/2022  [facility]    4/6/2023  1:00 PM Hugo Alvarado MD [facility]  [facility]        No follow-ups on file.     Signature:  Kelli Paez, " FNP    Date of encounter: 9/22/22

## 2022-09-23 DIAGNOSIS — Z11.59 ENCOUNTER FOR HEPATITIS C SCREENING TEST FOR LOW RISK PATIENT: ICD-10-CM

## 2022-09-23 DIAGNOSIS — I10 PRIMARY HYPERTENSION: ICD-10-CM

## 2022-09-23 DIAGNOSIS — R73.03 PREDIABETES: Chronic | ICD-10-CM

## 2022-09-23 LAB
ALBUMIN SERPL BCP-MCNC: 3.7 G/DL (ref 3.5–5)
ALBUMIN/GLOB SERPL: 1.1 {RATIO}
ALP SERPL-CCNC: 133 U/L (ref 39–100)
ALT SERPL W P-5'-P-CCNC: 66 U/L (ref 13–56)
ANION GAP SERPL CALCULATED.3IONS-SCNC: 11 MMOL/L (ref 7–16)
AST SERPL W P-5'-P-CCNC: 67 U/L (ref 15–37)
BASOPHILS # BLD AUTO: 0.07 K/UL (ref 0–0.2)
BASOPHILS NFR BLD AUTO: 0.8 % (ref 0–1)
BILIRUB SERPL-MCNC: 0.7 MG/DL (ref ?–1.2)
BUN SERPL-MCNC: 10 MG/DL (ref 7–18)
BUN/CREAT SERPL: 14 (ref 6–20)
CALCIUM SERPL-MCNC: 8.9 MG/DL (ref 8.5–10.1)
CHLORIDE SERPL-SCNC: 106 MMOL/L (ref 98–107)
CHOLEST SERPL-MCNC: 141 MG/DL (ref 0–200)
CHOLEST/HDLC SERPL: 2.8 {RATIO}
CO2 SERPL-SCNC: 29 MMOL/L (ref 21–32)
CREAT SERPL-MCNC: 0.69 MG/DL (ref 0.55–1.02)
CREAT UR-MCNC: 95 MG/DL (ref 28–219)
DIFFERENTIAL METHOD BLD: ABNORMAL
EGFR (NO RACE VARIABLE) (RUSH/TITUS): 109 ML/MIN/1.73M²
EOSINOPHIL # BLD AUTO: 0.26 K/UL (ref 0–0.5)
EOSINOPHIL NFR BLD AUTO: 3.1 % (ref 1–4)
ERYTHROCYTE [DISTWIDTH] IN BLOOD BY AUTOMATED COUNT: 12.7 % (ref 11.5–14.5)
EST. AVERAGE GLUCOSE BLD GHB EST-MCNC: 94 MG/DL
GLOBULIN SER-MCNC: 3.4 G/DL (ref 2–4)
GLUCOSE SERPL-MCNC: 101 MG/DL (ref 74–106)
HBA1C MFR BLD HPLC: 5.4 % (ref 4.5–6.6)
HCT VFR BLD AUTO: 43.5 % (ref 38–47)
HCV AB SER QL: NORMAL
HDLC SERPL-MCNC: 50 MG/DL (ref 40–60)
HGB BLD-MCNC: 15 G/DL (ref 12–16)
IMM GRANULOCYTES # BLD AUTO: 0.02 K/UL (ref 0–0.04)
IMM GRANULOCYTES NFR BLD: 0.2 % (ref 0–0.4)
LDLC SERPL CALC-MCNC: 54 MG/DL
LDLC/HDLC SERPL: 1.1 {RATIO}
LYMPHOCYTES # BLD AUTO: 2.63 K/UL (ref 1–4.8)
LYMPHOCYTES NFR BLD AUTO: 31.8 % (ref 27–41)
MCH RBC QN AUTO: 31.8 PG (ref 27–31)
MCHC RBC AUTO-ENTMCNC: 34.5 G/DL (ref 32–36)
MCV RBC AUTO: 92.2 FL (ref 80–96)
MICROALBUMIN UR-MCNC: 4.1 MG/DL (ref 0–2.8)
MICROALBUMIN/CREAT RATIO PNL UR: 43.2 MG/G (ref 0–30)
MONOCYTES # BLD AUTO: 0.91 K/UL (ref 0–0.8)
MONOCYTES NFR BLD AUTO: 11 % (ref 2–6)
MPC BLD CALC-MCNC: 12 FL (ref 9.4–12.4)
NEUTROPHILS # BLD AUTO: 4.38 K/UL (ref 1.8–7.7)
NEUTROPHILS NFR BLD AUTO: 53.1 % (ref 53–65)
NONHDLC SERPL-MCNC: 91 MG/DL
NRBC # BLD AUTO: 0 X10E3/UL
NRBC, AUTO (.00): 0 %
PLATELET # BLD AUTO: 182 K/UL (ref 150–400)
POTASSIUM SERPL-SCNC: 3.7 MMOL/L (ref 3.5–5.1)
PROT SERPL-MCNC: 7.1 G/DL (ref 6.4–8.2)
RBC # BLD AUTO: 4.72 M/UL (ref 4.2–5.4)
SODIUM SERPL-SCNC: 142 MMOL/L (ref 136–145)
TRIGL SERPL-MCNC: 187 MG/DL (ref 35–150)
VLDLC SERPL-MCNC: 37 MG/DL
WBC # BLD AUTO: 8.27 K/UL (ref 4.5–11)

## 2022-09-23 PROCEDURE — 82043 MICROALBUMIN / CREATININE RATIO URINE: ICD-10-PCS | Mod: ,,, | Performed by: CLINICAL MEDICAL LABORATORY

## 2022-09-23 PROCEDURE — 85025 COMPLETE CBC W/AUTO DIFF WBC: CPT | Mod: ,,, | Performed by: CLINICAL MEDICAL LABORATORY

## 2022-09-23 PROCEDURE — 85025 CBC WITH DIFFERENTIAL: ICD-10-PCS | Mod: ,,, | Performed by: CLINICAL MEDICAL LABORATORY

## 2022-09-23 PROCEDURE — 80061 LIPID PANEL: ICD-10-PCS | Mod: ,,, | Performed by: CLINICAL MEDICAL LABORATORY

## 2022-09-23 PROCEDURE — 80053 COMPREHENSIVE METABOLIC PANEL: ICD-10-PCS | Mod: ,,, | Performed by: CLINICAL MEDICAL LABORATORY

## 2022-09-23 PROCEDURE — 80061 LIPID PANEL: CPT | Mod: ,,, | Performed by: CLINICAL MEDICAL LABORATORY

## 2022-09-23 PROCEDURE — 86803 HEPATITIS C ANTIBODY: ICD-10-PCS | Mod: ,,, | Performed by: CLINICAL MEDICAL LABORATORY

## 2022-09-23 PROCEDURE — 82570 MICROALBUMIN / CREATININE RATIO URINE: ICD-10-PCS | Mod: ,,, | Performed by: CLINICAL MEDICAL LABORATORY

## 2022-09-23 PROCEDURE — 83036 HEMOGLOBIN GLYCOSYLATED A1C: CPT | Mod: ,,, | Performed by: CLINICAL MEDICAL LABORATORY

## 2022-09-23 PROCEDURE — 86803 HEPATITIS C AB TEST: CPT | Mod: ,,, | Performed by: CLINICAL MEDICAL LABORATORY

## 2022-09-23 PROCEDURE — 82570 ASSAY OF URINE CREATININE: CPT | Mod: ,,, | Performed by: CLINICAL MEDICAL LABORATORY

## 2022-09-23 PROCEDURE — 83036 HEMOGLOBIN A1C: ICD-10-PCS | Mod: ,,, | Performed by: CLINICAL MEDICAL LABORATORY

## 2022-09-23 PROCEDURE — 82043 UR ALBUMIN QUANTITATIVE: CPT | Mod: ,,, | Performed by: CLINICAL MEDICAL LABORATORY

## 2022-09-23 PROCEDURE — 80053 COMPREHEN METABOLIC PANEL: CPT | Mod: ,,, | Performed by: CLINICAL MEDICAL LABORATORY

## 2022-09-26 NOTE — PROGRESS NOTES
Crystal,   Your blood work shows that you have normal blood sugar, hepatitis C antibody is negative.  Cholesterol is  good, stay on meds.   Chemistry panel  is normal other than liver tests which are a little elevated--need to  cut back on fried foods, etc to  help with fatty liver and exercise more.   Blood count is  normal/tm

## 2022-11-03 ENCOUNTER — OFFICE VISIT (OUTPATIENT)
Dept: FAMILY MEDICINE | Facility: CLINIC | Age: 47
End: 2022-11-03
Payer: COMMERCIAL

## 2022-11-03 VITALS
HEART RATE: 71 BPM | OXYGEN SATURATION: 95 % | DIASTOLIC BLOOD PRESSURE: 62 MMHG | RESPIRATION RATE: 20 BRPM | WEIGHT: 248.81 LBS | SYSTOLIC BLOOD PRESSURE: 118 MMHG | BODY MASS INDEX: 44.07 KG/M2 | TEMPERATURE: 98 F

## 2022-11-03 DIAGNOSIS — J02.9 SORE THROAT: ICD-10-CM

## 2022-11-03 DIAGNOSIS — Z23 NEED FOR PROPHYLACTIC VACCINATION AND INOCULATION AGAINST INFLUENZA: ICD-10-CM

## 2022-11-03 DIAGNOSIS — K11.20 SIALADENITIS: Primary | ICD-10-CM

## 2022-11-03 LAB
CTP QC/QA: YES
S PYO RRNA THROAT QL PROBE: NEGATIVE

## 2022-11-03 PROCEDURE — 3078F DIAST BP <80 MM HG: CPT | Mod: CPTII,,, | Performed by: NURSE PRACTITIONER

## 2022-11-03 PROCEDURE — 99214 PR OFFICE/OUTPT VISIT, EST, LEVL IV, 30-39 MIN: ICD-10-PCS | Mod: 25,,, | Performed by: NURSE PRACTITIONER

## 2022-11-03 PROCEDURE — 3066F PR DOCUMENTATION OF TREATMENT FOR NEPHROPATHY: ICD-10-PCS | Mod: CPTII,,, | Performed by: NURSE PRACTITIONER

## 2022-11-03 PROCEDURE — 3078F PR MOST RECENT DIASTOLIC BLOOD PRESSURE < 80 MM HG: ICD-10-PCS | Mod: CPTII,,, | Performed by: NURSE PRACTITIONER

## 2022-11-03 PROCEDURE — 3060F PR POS MICROALBUMINURIA RESULT DOCUMENTED/REVIEW: ICD-10-PCS | Mod: CPTII,,, | Performed by: NURSE PRACTITIONER

## 2022-11-03 PROCEDURE — 3074F SYST BP LT 130 MM HG: CPT | Mod: CPTII,,, | Performed by: NURSE PRACTITIONER

## 2022-11-03 PROCEDURE — 90686 IIV4 VACC NO PRSV 0.5 ML IM: CPT | Mod: ,,, | Performed by: NURSE PRACTITIONER

## 2022-11-03 PROCEDURE — 3044F HG A1C LEVEL LT 7.0%: CPT | Mod: CPTII,,, | Performed by: NURSE PRACTITIONER

## 2022-11-03 PROCEDURE — 90471 FLU VACCINE (QUAD) GREATER THAN OR EQUAL TO 3YO PRESERVATIVE FREE IM: ICD-10-PCS | Mod: ,,, | Performed by: NURSE PRACTITIONER

## 2022-11-03 PROCEDURE — 87880 STREP A ASSAY W/OPTIC: CPT | Mod: QW,,, | Performed by: NURSE PRACTITIONER

## 2022-11-03 PROCEDURE — 3044F PR MOST RECENT HEMOGLOBIN A1C LEVEL <7.0%: ICD-10-PCS | Mod: CPTII,,, | Performed by: NURSE PRACTITIONER

## 2022-11-03 PROCEDURE — 87880 POCT RAPID STREP A: ICD-10-PCS | Mod: QW,,, | Performed by: NURSE PRACTITIONER

## 2022-11-03 PROCEDURE — 99214 OFFICE O/P EST MOD 30 MIN: CPT | Mod: 25,,, | Performed by: NURSE PRACTITIONER

## 2022-11-03 PROCEDURE — 90471 IMMUNIZATION ADMIN: CPT | Mod: ,,, | Performed by: NURSE PRACTITIONER

## 2022-11-03 PROCEDURE — 1159F PR MEDICATION LIST DOCUMENTED IN MEDICAL RECORD: ICD-10-PCS | Mod: CPTII,,, | Performed by: NURSE PRACTITIONER

## 2022-11-03 PROCEDURE — 90686 FLU VACCINE (QUAD) GREATER THAN OR EQUAL TO 3YO PRESERVATIVE FREE IM: ICD-10-PCS | Mod: ,,, | Performed by: NURSE PRACTITIONER

## 2022-11-03 PROCEDURE — 3060F POS MICROALBUMINURIA REV: CPT | Mod: CPTII,,, | Performed by: NURSE PRACTITIONER

## 2022-11-03 PROCEDURE — 1159F MED LIST DOCD IN RCRD: CPT | Mod: CPTII,,, | Performed by: NURSE PRACTITIONER

## 2022-11-03 PROCEDURE — 3066F NEPHROPATHY DOC TX: CPT | Mod: CPTII,,, | Performed by: NURSE PRACTITIONER

## 2022-11-03 PROCEDURE — 3008F PR BODY MASS INDEX (BMI) DOCUMENTED: ICD-10-PCS | Mod: CPTII,,, | Performed by: NURSE PRACTITIONER

## 2022-11-03 PROCEDURE — 3008F BODY MASS INDEX DOCD: CPT | Mod: CPTII,,, | Performed by: NURSE PRACTITIONER

## 2022-11-03 PROCEDURE — 3074F PR MOST RECENT SYSTOLIC BLOOD PRESSURE < 130 MM HG: ICD-10-PCS | Mod: CPTII,,, | Performed by: NURSE PRACTITIONER

## 2022-11-03 RX ORDER — AMOXICILLIN 875 MG/1
875 TABLET, FILM COATED ORAL EVERY 12 HOURS
Qty: 14 TABLET | Refills: 0 | Status: SHIPPED | OUTPATIENT
Start: 2022-11-03 | End: 2022-11-14 | Stop reason: ALTCHOICE

## 2022-11-03 RX ORDER — TOLTERODINE 4 MG/1
CAPSULE, EXTENDED RELEASE ORAL
COMMUNITY
End: 2023-01-27

## 2022-11-03 RX ORDER — DEXLANSOPRAZOLE 60 MG/1
CAPSULE, DELAYED RELEASE ORAL
COMMUNITY
End: 2023-02-15 | Stop reason: SDUPTHER

## 2022-11-03 NOTE — PROGRESS NOTES
SUNI Cruz   [facility]  [address] [address]   [place] [address]     PATIENT NAME: Glendy Engle  : 1975  DATE: 11/3/22  MRN: [id number]      Billing Provider: SUNI Cruz  Level of Service: CA OFFICE/OUTPT VISIT, EST, LEVL IV, 30-39 MIN  Patient PCP Information       Provider PCP Type    SUNI Cruz General            Reason for Visit / Chief Complaint: Sore Throat (Fatou and swelling in the right side of her neck. Thinks it may be a lymph node. States she had a colonoscopy 22 and had 10 polyps removed. A few hrs after she got home afterwards her neck and throat started getting sore. )       Update PCP  Update Chief Complaint         History of Present Illness / Problem Focused Workflow     Glendy Engle is a 47 y.o. female presents to the clinic  with complaint of swelling right jaw line that began 22 after colonoscopy.  The pain is described as sore and is uncomfortable to swallow. No fever.   She has not had any decreased production of saliva noted but has history of salivary gland stone.  She has  SHERLYN and has not started her new machine yet.    She had colonoscopy on 22 with 10 polyps found.   She is to have repeated in 10 years.   She has GERD and is on  Protonix daily.        Review of Systems     Review of Systems   Constitutional:  Negative for chills, fatigue and fever.   HENT:  Positive for sore throat. Negative for nasal congestion, mouth dryness and voice change.    Respiratory:  Negative for cough, chest tightness and shortness of breath.    Cardiovascular:  Negative for chest pain, palpitations and leg swelling.   Gastrointestinal:  Negative for nausea, vomiting and reflux.   Neurological:  Negative for weakness and memory loss.   Psychiatric/Behavioral:  Negative for confusion and sleep disturbance.       Medical / Social / Family History     Past Medical History:   Diagnosis Date    Abnormal pulmonary function test 2019    Abnormal radiograph      Asthma     GERD (gastroesophageal reflux disease)     Hypertension     SHERLYN on CPAP     Thyroid nodule        Past Surgical History:   Procedure Laterality Date    CHOLECYSTECTOMY      COLONOSCOPY      ESOPHAGOGASTRODUODENOSCOPY      HYSTERECTOMY      LEG SURGERY      vein surgery on right leg    REDUCTION OF BOTH BREASTS      VASCULAR SURGERY      Rt GSV Laser Ablation Phlebectomy: Dr. Sergey Esteves  2017       Social History  Ms.  reports that she has never smoked. She has never used smokeless tobacco. She reports that she does not currently use alcohol. She reports that she does not use drugs.    Family History  Ms.'s family history includes Cancer in her father and maternal aunt; Diabetes in her maternal grandmother and paternal grandmother; Heart disease in her mother and paternal grandfather; Heart failure in her mother; Hypertension in her mother.    Medications and Allergies     Medications  Outpatient Medications Marked as Taking for the 11/3/22 encounter (Office Visit) with SUNI Cruz   Medication Sig Dispense Refill    albuterol (PROVENTIL/VENTOLIN HFA) 90 mcg/actuation inhaler Inhale 2 puffs into the lungs every 6 (six) hours as needed for Wheezing. Rescue 18 g 5    budesonide-formoterol 160-4.5 mcg (SYMBICORT) 160-4.5 mcg/actuation HFAA Inhale 2 puffs into the lungs every 12 (twelve) hours. Controller      cyclobenzaprine (FLEXERIL) 10 MG tablet Take 10 mg by mouth 3 (three) times daily as needed for Muscle spasms.      dexlansoprazole (DEXILANT) 60 mg capsule Dexilant 60 mg capsule, delayed release   Take one capsule by oral route once daily      famotidine (PEPCID) 40 MG tablet Take 40 mg by mouth 2 (two) times daily.      fexofenadine (ALLEGRA) 180 MG tablet Take 180 mg by mouth once daily.      furosemide (LASIX) 20 MG tablet richar 1-2 tabs daily as needed for swelling.  Take potassium with Lasix/tm 60 tablet 11    HYDROcodone-acetaminophen (NORCO)  mg per tablet Take 1 tablet by mouth 2 (two)  times daily.      lisinopriL (PRINIVIL,ZESTRIL) 20 MG tablet Take 1 tablet (20 mg total) by mouth once daily. 90 tablet 3    metFORMIN (GLUCOPHAGE) 500 MG tablet Take 1 tablet (500 mg total) by mouth 2 (two) times daily with meals. 180 tablet 3    pantoprazole (PROTONIX) 40 MG tablet Take 40 mg by mouth once daily.      paroxetine (PAXIL) 20 MG tablet Take 1 tablet (20 mg total) by mouth nightly. 90 tablet 1    phenazopyridine (PYRIDIUM) 100 MG tablet Take 100 mg by mouth 3 (three) times daily as needed for Pain.      potassium chloride (MICRO-K) 10 MEQ CpSR Take 20 mEq by mouth 2 (two) times daily. 10 mEq    2 caps   BID with Lasix      simvastatin (ZOCOR) 40 MG tablet Take 40 mg by mouth every evening.      sucralfate (CARAFATE) 1 gram tablet Take 1 tablet (1 g total) by mouth 2 (two) times daily. AM &  tablet 1    tolterodine (DETROL LA) 4 MG 24 hr capsule Detrol LA 4 mg capsule,extended release   Take 1 capsule every day by oral route for 30 days.       Current Facility-Administered Medications for the 11/3/22 encounter (Office Visit) with SUNI Cruz   Medication Dose Route Frequency Provider Last Rate Last Admin    albuterol inhaler 2 puff  2 puff Inhalation Q20 Min PRN SUNI Cruz        sodium chloride 0.9% flush 10 mL  10 mL Intravenous PRN SUNI Cruz           Allergies  Review of patient's allergies indicates:   Allergen Reactions    Bactrim [sulfamethoxazole-trimethoprim] Hives       Physical Examination     Vitals:    11/03/22 1023   BP: 118/62   BP Location: Left arm   Patient Position: Sitting   BP Method: Large (Automatic)   Pulse: 71   Resp: 20   Temp: 98.1 °F (36.7 °C)   TempSrc: Oral   SpO2: 95%   Weight: 112.9 kg (248 lb 12.8 oz)      Physical Exam  HENT:      Right Ear: Tympanic membrane normal.      Left Ear: Tympanic membrane normal.      Nose: Nose normal.      Mouth/Throat:      Pharynx: Posterior oropharyngeal erythema (minimal erythem) present. No oropharyngeal exudate.    Neck:      Comments: Very tender to palp  right neck area with swelling noted.   Cardiovascular:      Rate and Rhythm: Normal rate and regular rhythm.      Heart sounds: Normal heart sounds.   Pulmonary:      Effort: Pulmonary effort is normal.      Breath sounds: Normal breath sounds.   Musculoskeletal:      Right lower leg: No edema.      Left lower leg: No edema.   Skin:     General: Skin is warm and dry.   Neurological:      Mental Status: She is alert and oriented to person, place, and time.   Psychiatric:         Mood and Affect: Mood normal.         Behavior: Behavior normal.        Assessment and Plan (including Health Maintenance)      Problem List  Smart Sets  Document Outside HM   :    Plan:  rapid strep negative.  Suspect salivary stone-- recommended she  eat lots of sour candies  to increase saliva production and  will rx antibiotics.  Tylenol  for pain as needed, heat application.   Follow up with dentist if not improving or with ENT        Health Maintenance Due   Topic Date Due    Pneumococcal Vaccines (Age 0-64) (1 - PCV) Never done    HIV Screening  Never done    COVID-19 Vaccine (3 - Booster for Adele series) 01/24/2022    Mammogram  11/15/2022       Problem List Items Addressed This Visit          ENT    Sialadenitis - Primary    Sore throat    Relevant Medications    amoxicillin (AMOXIL) 875 MG tablet       ID    Need for prophylactic vaccination and inoculation against influenza     Sialadenitis    Sore throat  -     POCT rapid strep A  -     amoxicillin (AMOXIL) 875 MG tablet; Take 1 tablet (875 mg total) by mouth every 12 (twelve) hours.  Dispense: 14 tablet; Refill: 0    Need for prophylactic vaccination and inoculation against influenza  -     Influenza - Quadrivalent *Preferred* (6 months+) (PF)     Health Maintenance Topics with due status: Not Due       Topic Last Completion Date    TETANUS VACCINE 05/30/2017    Colorectal Cancer Screening 07/08/2020    Lipid Panel 09/23/2022        Procedures     Future Appointments   Date Time Provider Department Center   11/14/2022  9:00 AM SUNI Cruz [facility]   12/7/2022  3:00 PM Elmer Cross DO [facility] ROCHELLE Romo   12/13/2022  [facility]   4/6/2023  1:00 PM Hugo Alvarado MD [facility]        No follow-ups on file.     Signature:  SUNI Cruz    Date of encounter: 11/3/22

## 2022-11-14 ENCOUNTER — OFFICE VISIT (OUTPATIENT)
Dept: FAMILY MEDICINE | Facility: CLINIC | Age: 47
End: 2022-11-14
Payer: COMMERCIAL

## 2022-11-14 VITALS
BODY MASS INDEX: 44.89 KG/M2 | OXYGEN SATURATION: 96 % | DIASTOLIC BLOOD PRESSURE: 62 MMHG | HEART RATE: 77 BPM | RESPIRATION RATE: 20 BRPM | TEMPERATURE: 98 F | SYSTOLIC BLOOD PRESSURE: 145 MMHG | WEIGHT: 253.38 LBS

## 2022-11-14 DIAGNOSIS — I10 PRIMARY HYPERTENSION: Chronic | ICD-10-CM

## 2022-11-14 DIAGNOSIS — Z23 NEED FOR PNEUMOCOCCAL VACCINATION: ICD-10-CM

## 2022-11-14 DIAGNOSIS — E66.01 CLASS 3 SEVERE OBESITY DUE TO EXCESS CALORIES WITH SERIOUS COMORBIDITY AND BODY MASS INDEX (BMI) OF 40.0 TO 44.9 IN ADULT: Chronic | ICD-10-CM

## 2022-11-14 DIAGNOSIS — Z13.220 SCREENING FOR LIPID DISORDERS: ICD-10-CM

## 2022-11-14 DIAGNOSIS — M54.16 LUMBAR BACK PAIN WITH RADICULOPATHY AFFECTING RIGHT LOWER EXTREMITY: ICD-10-CM

## 2022-11-14 DIAGNOSIS — G62.9 PERIPHERAL POLYNEUROPATHY: ICD-10-CM

## 2022-11-14 DIAGNOSIS — Z13.1 SCREENING FOR DIABETES MELLITUS: ICD-10-CM

## 2022-11-14 DIAGNOSIS — K21.00 GASTROESOPHAGEAL REFLUX DISEASE WITH ESOPHAGITIS WITHOUT HEMORRHAGE: Chronic | ICD-10-CM

## 2022-11-14 DIAGNOSIS — J45.30 MILD PERSISTENT ASTHMA WITHOUT COMPLICATION: Chronic | ICD-10-CM

## 2022-11-14 DIAGNOSIS — G47.33 OSA ON CPAP: Chronic | ICD-10-CM

## 2022-11-14 DIAGNOSIS — Z00.00 ROUTINE GENERAL MEDICAL EXAMINATION AT A HEALTH CARE FACILITY: Primary | ICD-10-CM

## 2022-11-14 PROBLEM — U07.1 COVID-19 WITH MULTIPLE COMORBIDITIES: Status: RESOLVED | Noted: 2022-07-11 | Resolved: 2022-11-14

## 2022-11-14 PROBLEM — J02.9 SORE THROAT: Status: RESOLVED | Noted: 2022-11-03 | Resolved: 2022-11-14

## 2022-11-14 PROBLEM — K11.20 SIALADENITIS: Status: RESOLVED | Noted: 2022-11-03 | Resolved: 2022-11-14

## 2022-11-14 PROCEDURE — 3044F PR MOST RECENT HEMOGLOBIN A1C LEVEL <7.0%: ICD-10-PCS | Mod: CPTII,,, | Performed by: NURSE PRACTITIONER

## 2022-11-14 PROCEDURE — 3044F HG A1C LEVEL LT 7.0%: CPT | Mod: CPTII,,, | Performed by: NURSE PRACTITIONER

## 2022-11-14 PROCEDURE — 90677 PNEUMOCOCCAL CONJUGATE VACCINE 20-VALENT: ICD-10-PCS | Mod: ,,, | Performed by: NURSE PRACTITIONER

## 2022-11-14 PROCEDURE — 3066F PR DOCUMENTATION OF TREATMENT FOR NEPHROPATHY: ICD-10-PCS | Mod: CPTII,,, | Performed by: NURSE PRACTITIONER

## 2022-11-14 PROCEDURE — 3008F BODY MASS INDEX DOCD: CPT | Mod: CPTII,,, | Performed by: NURSE PRACTITIONER

## 2022-11-14 PROCEDURE — 3060F PR POS MICROALBUMINURIA RESULT DOCUMENTED/REVIEW: ICD-10-PCS | Mod: CPTII,,, | Performed by: NURSE PRACTITIONER

## 2022-11-14 PROCEDURE — 3077F SYST BP >= 140 MM HG: CPT | Mod: CPTII,,, | Performed by: NURSE PRACTITIONER

## 2022-11-14 PROCEDURE — 3008F PR BODY MASS INDEX (BMI) DOCUMENTED: ICD-10-PCS | Mod: CPTII,,, | Performed by: NURSE PRACTITIONER

## 2022-11-14 PROCEDURE — 3078F DIAST BP <80 MM HG: CPT | Mod: CPTII,,, | Performed by: NURSE PRACTITIONER

## 2022-11-14 PROCEDURE — 3066F NEPHROPATHY DOC TX: CPT | Mod: CPTII,,, | Performed by: NURSE PRACTITIONER

## 2022-11-14 PROCEDURE — 1159F MED LIST DOCD IN RCRD: CPT | Mod: CPTII,,, | Performed by: NURSE PRACTITIONER

## 2022-11-14 PROCEDURE — 99396 PR PREVENTIVE VISIT,EST,40-64: ICD-10-PCS | Mod: 25,,, | Performed by: NURSE PRACTITIONER

## 2022-11-14 PROCEDURE — 90471 IMMUNIZATION ADMIN: CPT | Mod: ,,, | Performed by: NURSE PRACTITIONER

## 2022-11-14 PROCEDURE — 3060F POS MICROALBUMINURIA REV: CPT | Mod: CPTII,,, | Performed by: NURSE PRACTITIONER

## 2022-11-14 PROCEDURE — 90471 PNEUMOCOCCAL CONJUGATE VACCINE 20-VALENT: ICD-10-PCS | Mod: ,,, | Performed by: NURSE PRACTITIONER

## 2022-11-14 PROCEDURE — 99396 PREV VISIT EST AGE 40-64: CPT | Mod: 25,,, | Performed by: NURSE PRACTITIONER

## 2022-11-14 PROCEDURE — 1159F PR MEDICATION LIST DOCUMENTED IN MEDICAL RECORD: ICD-10-PCS | Mod: CPTII,,, | Performed by: NURSE PRACTITIONER

## 2022-11-14 PROCEDURE — 3077F PR MOST RECENT SYSTOLIC BLOOD PRESSURE >= 140 MM HG: ICD-10-PCS | Mod: CPTII,,, | Performed by: NURSE PRACTITIONER

## 2022-11-14 PROCEDURE — 3078F PR MOST RECENT DIASTOLIC BLOOD PRESSURE < 80 MM HG: ICD-10-PCS | Mod: CPTII,,, | Performed by: NURSE PRACTITIONER

## 2022-11-14 PROCEDURE — 90677 PCV20 VACCINE IM: CPT | Mod: ,,, | Performed by: NURSE PRACTITIONER

## 2022-11-14 RX ORDER — MINERAL OIL
180 ENEMA (ML) RECTAL DAILY
Qty: 90 TABLET | Refills: 3 | Status: SHIPPED | OUTPATIENT
Start: 2022-11-14

## 2022-11-14 NOTE — PROGRESS NOTES
SUNI Cruz   [facility]  [address]   [place] [address]     PATIENT NAME: Glendy Engle  : 1975  DATE: 22  MRN: [id number]      Billing Provider: SUNI Cruz  Level of Service:   Patient PCP Information       Provider PCP Type    SUNI Cruz General            Reason for Visit / Chief Complaint: Medication Refill (Allegra, question about Dexilant)       Update PCP  Update Chief Complaint         History of Present Illness / Problem Focused Workflow     Glendy Engle is a 47 y.o. female presents to the clinic  for [facility] but has recently had her fasting labs done in 2022. She has mammogram scheduled  this week  [facility].  She has had her flu, covid booster vaccine at [facility] and will need  and needs pneumonia vaccine.   She has pre diabetes and is checking her glucose 2 times daily with glucose up to 200   one night recently but generally runs 110-140--she did not bring her glucose log.   She is checking her bp at home and   her systolic has been staying less than 135 consistently and down  to 118 systolic     She has  allergies   and takes Allegra daily.   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     She has GERD and is supposed to be on Dexilant but is not able to get meds thru pharmacy. She is  currently taking pantoprazole every day and  has had numerous  polyps removed recently.   She is  not on  celebrex. Will need to get her colonoscopy report.  She is having neuropathy of  both feet  with burning and stinging  that comes and goes.    She is diabetic and takes long term PPI's--may need additional labs.  She has history of moderate persistent asthma and uses her inhalers daily--worse when weather changes and around [employer] [employer].,      Review of Systems     Review of Systems   Constitutional:  Negative for fatigue.   HENT:  Negative for nasal congestion and sore throat.    Respiratory:  Negative  for cough, chest tightness and shortness of breath.    Cardiovascular:  Negative for chest pain, palpitations and leg swelling.   Gastrointestinal:  Negative for nausea, vomiting and reflux.   Neurological:  Positive for numbness (neuropathy in both hands and feet). Negative for weakness and memory loss.   Psychiatric/Behavioral:  Negative for confusion and sleep disturbance.       Medical / Social / Family History     Past Medical History:   Diagnosis Date    Abnormal pulmonary function test 12/2019    Abnormal radiograph     Asthma     GERD (gastroesophageal reflux disease)     Hypertension     SHERLYN on CPAP     Thyroid nodule        Past Surgical History:   Procedure Laterality Date    CHOLECYSTECTOMY      COLONOSCOPY      ESOPHAGOGASTRODUODENOSCOPY      HYSTERECTOMY      LEG SURGERY      vein surgery on right leg    REDUCTION OF BOTH BREASTS      VASCULAR SURGERY      Rt GSV Laser Ablation Phlebectomy: Dr. Sergey Esteves  2017       Social History  Ms.  reports that she has never smoked. She has never used smokeless tobacco. She reports that she does not currently use alcohol. She reports that she does not use drugs.    Family History  Ms.'s family history includes Cancer in her father and maternal aunt; Diabetes in her maternal grandmother and paternal grandmother; Heart disease in her mother and paternal grandfather; Heart failure in her mother; Hypertension in her mother.    Medications and Allergies     Medications  Outpatient Medications Marked as Taking for the 11/14/22 encounter (Office Visit) with SUNI Cruz   Medication Sig Dispense Refill    albuterol (PROVENTIL/VENTOLIN HFA) 90 mcg/actuation inhaler Inhale 2 puffs into the lungs every 6 (six) hours as needed for Wheezing. Rescue 18 g 5    budesonide-formoterol 160-4.5 mcg (SYMBICORT) 160-4.5 mcg/actuation HFAA Inhale 2 puffs into the lungs every 12 (twelve) hours. Controller      cyclobenzaprine (FLEXERIL) 10 MG tablet Take 10 mg by mouth 3 (three) times  daily as needed for Muscle spasms.      HYDROcodone-acetaminophen (NORCO)  mg per tablet Take 1 tablet by mouth 2 (two) times daily.      lisinopriL (PRINIVIL,ZESTRIL) 20 MG tablet Take 1 tablet (20 mg total) by mouth once daily. 90 tablet 3    metFORMIN (GLUCOPHAGE) 500 MG tablet Take 1 tablet (500 mg total) by mouth 2 (two) times daily with meals. 180 tablet 3    pantoprazole (PROTONIX) 40 MG tablet Take 40 mg by mouth once daily.      paroxetine (PAXIL) 20 MG tablet Take 1 tablet (20 mg total) by mouth nightly. 90 tablet 1    simvastatin (ZOCOR) 40 MG tablet Take 40 mg by mouth every evening.      sucralfate (CARAFATE) 1 gram tablet Take 1 tablet (1 g total) by mouth 2 (two) times daily. AM &  tablet 1    tolterodine (DETROL LA) 4 MG 24 hr capsule Detrol LA 4 mg capsule,extended release   Take 1 capsule every day by oral route for 30 days.      [DISCONTINUED] fexofenadine (ALLEGRA) 180 MG tablet Take 180 mg by mouth once daily.       Current Facility-Administered Medications for the 11/14/22 encounter (Office Visit) with SUNI Cruz   Medication Dose Route Frequency Provider Last Rate Last Admin    sodium chloride 0.9% flush 10 mL  10 mL Intravenous PRN SUNI Cruz        [DISCONTINUED] albuterol inhaler 2 puff  2 puff Inhalation Q20 Min PRN SUNI Cruz           Allergies  Review of patient's allergies indicates:   Allergen Reactions    Bactrim [sulfamethoxazole-trimethoprim] Hives       Physical Examination     Vitals:    11/14/22 0921   BP: (!) 145/62   BP Location: Left arm   Patient Position: Sitting   BP Method: Large (Automatic)   Pulse: 77   Resp: 20   Temp: 98 °F (36.7 °C)   TempSrc: Oral   SpO2: 96%   Weight: 114.9 kg (253 lb 6.4 oz)      Physical Exam  Constitutional:       Appearance: Normal appearance.   HENT:      Right Ear: Tympanic membrane, ear canal and external ear normal.      Left Ear: Tympanic membrane, ear canal and external ear normal.      Mouth/Throat:      Mouth:  Mucous membranes are moist.      Pharynx: No posterior oropharyngeal erythema.   Cardiovascular:      Rate and Rhythm: Normal rate and regular rhythm.      Pulses: Normal pulses.      Heart sounds: Normal heart sounds.   Pulmonary:      Effort: Pulmonary effort is normal.      Breath sounds: Normal breath sounds.   Musculoskeletal:      Right lower leg: Edema present.      Left lower leg: No edema.   Lymphadenopathy:      Cervical: No cervical adenopathy.   Skin:     General: Skin is warm and dry.   Neurological:      General: No focal deficit present.      Mental Status: She is alert and oriented to person, place, and time.        Assessment and Plan (including Health Maintenance)      Problem List  Smart Sets  Document Outside HM   :    Plan:  encouraged to schedule dental and vision exams. Will have her come back for fasting labs ( lipid, glucose with B12 and TSH level)   Follow up with mammogram as planned and will need to get rcords from [facility]..  She has resumed her cpap but needs new mask and will follow up with Dr Cameron  for Rx    She is taking simvastatin and tolerating better.        Health Maintenance Due   Topic Date Due    HIV Screening  Never done    Mammogram  11/15/2022       Problem List Items Addressed This Visit    None  Visit Diagnoses       Need for pneumococcal vaccination    -  Primary    Screening for diabetes mellitus        Screening for lipid disorders        Peripheral polyneuropathy              Need for pneumococcal vaccination  -     (In Office Administered) Pneumococcal Conjugate Vaccine (20 Valent) (IM)    Screening for diabetes mellitus    Screening for lipid disorders    Peripheral polyneuropathy    Other orders  -     fexofenadine (ALLEGRA) 180 MG tablet; Take 1 tablet (180 mg total) by mouth once daily.  Dispense: 90 tablet; Refill: 3     Health Maintenance Topics with due status: Not Due       Topic Last Completion Date    TETANUS VACCINE 05/30/2017    Colorectal  Cancer Screening 07/08/2020    Lipid Panel 09/23/2022       Procedures     Future Appointments   Date Time Provider Department Center   11/15/2022  7:30 [facility]   12/7/2022  3:00 PM Elmer Cross DO [facility] SLEEP [facility]   12/13/2022  9:00 AM [facility] US1 [facility] Main    4/6/2023  1:00 PM Hugo Alvarado MD [facility]  [facility] [place] [facility]   11/15/2023  9:00 AM SUNI Cruz [facility]        No follow-ups on file.     Signature:  SUNI Cruz    Date of encounter: 11/14/22

## 2022-12-01 ENCOUNTER — OFFICE VISIT (OUTPATIENT)
Dept: FAMILY MEDICINE | Facility: CLINIC | Age: 47
End: 2022-12-01
Payer: COMMERCIAL

## 2022-12-01 VITALS
BODY MASS INDEX: 43.98 KG/M2 | HEIGHT: 63 IN | RESPIRATION RATE: 18 BRPM | WEIGHT: 248.19 LBS | HEART RATE: 73 BPM | OXYGEN SATURATION: 97 % | TEMPERATURE: 98 F | SYSTOLIC BLOOD PRESSURE: 126 MMHG | DIASTOLIC BLOOD PRESSURE: 82 MMHG

## 2022-12-01 DIAGNOSIS — Z20.828 CONTACT WITH AND (SUSPECTED) EXPOSURE TO OTHER VIRAL COMMUNICABLE DISEASES: ICD-10-CM

## 2022-12-01 DIAGNOSIS — J11.1 INFLUENZA-LIKE ILLNESS: Primary | ICD-10-CM

## 2022-12-01 LAB
CTP QC/QA: YES
FLUAV AG NPH QL: NEGATIVE
FLUBV AG NPH QL: NEGATIVE
SARS-COV-2 AG RESP QL IA.RAPID: NEGATIVE

## 2022-12-01 PROCEDURE — 3074F PR MOST RECENT SYSTOLIC BLOOD PRESSURE < 130 MM HG: ICD-10-PCS | Mod: CPTII,,, | Performed by: NURSE PRACTITIONER

## 2022-12-01 PROCEDURE — 3060F POS MICROALBUMINURIA REV: CPT | Mod: CPTII,,, | Performed by: NURSE PRACTITIONER

## 2022-12-01 PROCEDURE — 3060F PR POS MICROALBUMINURIA RESULT DOCUMENTED/REVIEW: ICD-10-PCS | Mod: CPTII,,, | Performed by: NURSE PRACTITIONER

## 2022-12-01 PROCEDURE — 1159F PR MEDICATION LIST DOCUMENTED IN MEDICAL RECORD: ICD-10-PCS | Mod: CPTII,,, | Performed by: NURSE PRACTITIONER

## 2022-12-01 PROCEDURE — 3074F SYST BP LT 130 MM HG: CPT | Mod: CPTII,,, | Performed by: NURSE PRACTITIONER

## 2022-12-01 PROCEDURE — 87428 POCT SARS-COV2 (COVID) WITH FLU ANTIGEN: ICD-10-PCS | Mod: QW,,, | Performed by: NURSE PRACTITIONER

## 2022-12-01 PROCEDURE — 3079F DIAST BP 80-89 MM HG: CPT | Mod: CPTII,,, | Performed by: NURSE PRACTITIONER

## 2022-12-01 PROCEDURE — 3008F PR BODY MASS INDEX (BMI) DOCUMENTED: ICD-10-PCS | Mod: CPTII,,, | Performed by: NURSE PRACTITIONER

## 2022-12-01 PROCEDURE — 87428 SARSCOV & INF VIR A&B AG IA: CPT | Mod: QW,,, | Performed by: NURSE PRACTITIONER

## 2022-12-01 PROCEDURE — 99213 PR OFFICE/OUTPT VISIT, EST, LEVL III, 20-29 MIN: ICD-10-PCS | Mod: ,,, | Performed by: NURSE PRACTITIONER

## 2022-12-01 PROCEDURE — 3044F PR MOST RECENT HEMOGLOBIN A1C LEVEL <7.0%: ICD-10-PCS | Mod: CPTII,,, | Performed by: NURSE PRACTITIONER

## 2022-12-01 PROCEDURE — 99213 OFFICE O/P EST LOW 20 MIN: CPT | Mod: ,,, | Performed by: NURSE PRACTITIONER

## 2022-12-01 PROCEDURE — 1159F MED LIST DOCD IN RCRD: CPT | Mod: CPTII,,, | Performed by: NURSE PRACTITIONER

## 2022-12-01 PROCEDURE — 3066F PR DOCUMENTATION OF TREATMENT FOR NEPHROPATHY: ICD-10-PCS | Mod: CPTII,,, | Performed by: NURSE PRACTITIONER

## 2022-12-01 PROCEDURE — 3066F NEPHROPATHY DOC TX: CPT | Mod: CPTII,,, | Performed by: NURSE PRACTITIONER

## 2022-12-01 PROCEDURE — 3044F HG A1C LEVEL LT 7.0%: CPT | Mod: CPTII,,, | Performed by: NURSE PRACTITIONER

## 2022-12-01 PROCEDURE — 3008F BODY MASS INDEX DOCD: CPT | Mod: CPTII,,, | Performed by: NURSE PRACTITIONER

## 2022-12-01 PROCEDURE — 3079F PR MOST RECENT DIASTOLIC BLOOD PRESSURE 80-89 MM HG: ICD-10-PCS | Mod: CPTII,,, | Performed by: NURSE PRACTITIONER

## 2022-12-01 RX ORDER — ONDANSETRON 4 MG/1
4 TABLET, FILM COATED ORAL EVERY 8 HOURS PRN
Qty: 15 TABLET | Refills: 0 | Status: SHIPPED | OUTPATIENT
Start: 2022-12-01 | End: 2023-01-27

## 2022-12-01 RX ORDER — OSELTAMIVIR PHOSPHATE 75 MG/1
75 CAPSULE ORAL 2 TIMES DAILY
Qty: 10 CAPSULE | Refills: 0 | Status: SHIPPED | OUTPATIENT
Start: 2022-12-01 | End: 2022-12-06

## 2022-12-01 NOTE — PROGRESS NOTES
Presents to the clinic for complaints of n/v/d , chest pain , dry cough and headache. Confirms chills but no fever known.  Confirms sore throat.Denies known exposure. Denies SOB , wheezing and reports history of asthma. Reports decrease appetite but drinking fluids. Lat diarrhea episodes this morning- reports diarrhea all day and night on yesterday. Denies loss of smell or taste, ear pain. Reports been taking Allegra but not any OTC.      Reports that she stopped the Lisinopril about 2 months ago related to low blood pressure readings. She checks her BP daily. Check blood sugar daily and runs .

## 2022-12-01 NOTE — PATIENT INSTRUCTIONS
will treat for flu based on 's positive test and increase fluids, fever meds as needed. Off work rest of week/tm

## 2022-12-01 NOTE — PROGRESS NOTES
SUNI Cruz   [facility]  [address] [address]   [place] [address]     PATIENT NAME: Glendy Engle  : 1975  DATE: 22  MRN: [id number]      Billing Provider: SUNI Cruz  Level of Service:   Patient PCP Information       Provider PCP Type    SUNI Cruz General            Reason for Visit / Chief Complaint: Nausea (Nausea and vomiting ), Diarrhea, Chest Pain, and Headache (Has had all symptoms listed x 2 days)       Update PCP  Update Chief Complaint         History of Present Illness / Problem Focused Workflow     Glendy Engle is a 47 y.o. female presents to the clinic  with flu like symptoms and  tests positive for Flu A.  She is able to keep fluids down.      Review of Systems     Review of Systems   Constitutional:  Negative for fatigue.   HENT:  Positive for nasal congestion and sore throat.    Respiratory:  Positive for cough. Negative for chest tightness and shortness of breath.    Cardiovascular:  Negative for chest pain, palpitations and leg swelling.   Gastrointestinal:  Positive for diarrhea and nausea. Negative for vomiting and reflux.   Neurological:  Negative for weakness and memory loss.   Psychiatric/Behavioral:  Negative for confusion and sleep disturbance.       Medical / Social / Family History     Past Medical History:   Diagnosis Date    Abnormal pulmonary function test 2019    Abnormal radiograph     Asthma     GERD (gastroesophageal reflux disease)     Hypertension     SHERLYN on CPAP     Thyroid nodule        Past Surgical History:   Procedure Laterality Date    CHOLECYSTECTOMY      COLONOSCOPY      ESOPHAGOGASTRODUODENOSCOPY      HYSTERECTOMY      LEG SURGERY      vein surgery on right leg    REDUCTION OF BOTH BREASTS      VASCULAR SURGERY      Rt GSV Laser Ablation Phlebectomy: Dr. Sergey Esteves         Social History  Ms.  reports that she has never smoked. She has never used smokeless tobacco. She reports that she does not currently use alcohol.  She reports that she does not use drugs.    Family History  Ms.'s family history includes Cancer in her father and maternal aunt; Diabetes in her maternal grandmother and paternal grandmother; Heart disease in her mother and paternal grandfather; Heart failure in her mother; Hypertension in her mother.    Medications and Allergies     Medications  Outpatient Medications Marked as Taking for the 12/1/22 encounter (Office Visit) with SUNI Cruz   Medication Sig Dispense Refill    albuterol (PROVENTIL/VENTOLIN HFA) 90 mcg/actuation inhaler Inhale 2 puffs into the lungs every 6 (six) hours as needed for Wheezing. Rescue 18 g 5    budesonide-formoterol 160-4.5 mcg (SYMBICORT) 160-4.5 mcg/actuation HFAA Inhale 2 puffs into the lungs every 12 (twelve) hours. Controller      cyclobenzaprine (FLEXERIL) 10 MG tablet Take 10 mg by mouth 3 (three) times daily as needed for Muscle spasms.      famotidine (PEPCID) 40 MG tablet Take 40 mg by mouth 2 (two) times daily.      fexofenadine (ALLEGRA) 180 MG tablet Take 1 tablet (180 mg total) by mouth once daily. 90 tablet 3    furosemide (LASIX) 20 MG tablet richar 1-2 tabs daily as needed for swelling.  Take potassium with Lasix/tm 60 tablet 11    HYDROcodone-acetaminophen (NORCO)  mg per tablet Take 1 tablet by mouth 2 (two) times daily.      metFORMIN (GLUCOPHAGE) 500 MG tablet Take 1 tablet (500 mg total) by mouth 2 (two) times daily with meals. 180 tablet 3    pantoprazole (PROTONIX) 40 MG tablet Take 40 mg by mouth once daily.      paroxetine (PAXIL) 20 MG tablet Take 1 tablet (20 mg total) by mouth nightly. 90 tablet 1    potassium chloride (MICRO-K) 10 MEQ CpSR Take 20 mEq by mouth 2 (two) times daily. 10 mEq    2 caps   BID with Lasix      simvastatin (ZOCOR) 40 MG tablet Take 40 mg by mouth every evening.      sucralfate (CARAFATE) 1 gram tablet Take 1 tablet (1 g total) by mouth 2 (two) times daily. AM &  tablet 1    tolterodine (DETROL LA) 4 MG 24 hr capsule  "Detrol LA 4 mg capsule,extended release   Take 1 capsule every day by oral route for 30 days.       Current Facility-Administered Medications for the 12/1/22 encounter (Office Visit) with SUNI Cruz   Medication Dose Route Frequency Provider Last Rate Last Admin    sodium chloride 0.9% flush 10 mL  10 mL Intravenous PRN SUNI Cruz           Allergies  Review of patient's allergies indicates:   Allergen Reactions    Bactrim [sulfamethoxazole-trimethoprim] Hives       Physical Examination     Vitals:    12/01/22 1032   BP: 126/82   Pulse: 73   Resp: 18   Temp: 98.4 °F (36.9 °C)   TempSrc: Oral   SpO2: 97%   Weight: 112.6 kg (248 lb 3.2 oz)   Height: 5' 3" (1.6 m)      Physical Exam  Constitutional:       Appearance: Normal appearance.   HENT:      Right Ear: Tympanic membrane normal.      Left Ear: Tympanic membrane normal.      Nose: Congestion present.      Mouth/Throat:      Pharynx: Posterior oropharyngeal erythema present. No oropharyngeal exudate.   Cardiovascular:      Rate and Rhythm: Normal rate and regular rhythm.      Pulses: Normal pulses.      Heart sounds: Normal heart sounds.   Pulmonary:      Effort: Pulmonary effort is normal.      Breath sounds: Normal breath sounds.   Neurological:      Mental Status: She is alert.        Assessment and Plan (including Health Maintenance)      Problem List  Smart Sets  Document Outside HM   :    Plan:  will treat for flu based on 's positive test and increase fluids, fever meds as needed. Off work rest of week/tm        Health Maintenance Due   Topic Date Due    HIV Screening  Never done    Mammogram  11/15/2022       Problem List Items Addressed This Visit    None  Visit Diagnoses       Contact with and (suspected) exposure to other viral communicable diseases    -  Primary    Relevant Orders    POCT SARS-COV2 (COVID) with Flu Antigen (Completed)          Contact with and (suspected) exposure to other viral communicable diseases  -     POCT " SARS-COV2 (COVID) with Flu Antigen       Health Maintenance Topics with due status: Not Due       Topic Last Completion Date    TETANUS VACCINE 05/30/2017    Hemoglobin A1c (Prediabetes) 09/23/2022    Colorectal Cancer Screening 11/12/2022    Lipid Panel 11/15/2022       Procedures     Future Appointments   Date Time Provider Department Center   12/7/2022  3:00 PM Elmer Cross DO [facility] SLEEP [facility]   12/13/2022  9:00 AM [facility] [address] [place] [address]   4/6/2023  1:00 PM [facility]   11/15/2023  9:00 AM SUNI Cruz [facility]        No follow-ups on file.     Signature:  SUNI Cruz    Date of encounter: 12/1/22

## 2022-12-07 ENCOUNTER — OFFICE VISIT (OUTPATIENT)
Dept: SLEEP MEDICINE | Facility: CLINIC | Age: 47
End: 2022-12-07
Attending: FAMILY MEDICINE
Payer: COMMERCIAL

## 2022-12-07 VITALS
SYSTOLIC BLOOD PRESSURE: 141 MMHG | DIASTOLIC BLOOD PRESSURE: 92 MMHG | WEIGHT: 255.19 LBS | HEART RATE: 82 BPM | HEIGHT: 63 IN | OXYGEN SATURATION: 100 % | BODY MASS INDEX: 45.21 KG/M2

## 2022-12-07 DIAGNOSIS — E66.01 MORBID OBESITY: ICD-10-CM

## 2022-12-07 DIAGNOSIS — G47.33 OSA ON CPAP: Primary | Chronic | ICD-10-CM

## 2022-12-07 PROCEDURE — 3080F DIAST BP >= 90 MM HG: CPT | Mod: CPTII,,, | Performed by: FAMILY MEDICINE

## 2022-12-07 PROCEDURE — 3066F PR DOCUMENTATION OF TREATMENT FOR NEPHROPATHY: ICD-10-PCS | Mod: CPTII,,, | Performed by: FAMILY MEDICINE

## 2022-12-07 PROCEDURE — 1159F MED LIST DOCD IN RCRD: CPT | Mod: CPTII,,, | Performed by: FAMILY MEDICINE

## 2022-12-07 PROCEDURE — 99212 PR OFFICE/OUTPT VISIT, EST, LEVL II, 10-19 MIN: ICD-10-PCS | Mod: S$PBB,,, | Performed by: FAMILY MEDICINE

## 2022-12-07 PROCEDURE — 3080F PR MOST RECENT DIASTOLIC BLOOD PRESSURE >= 90 MM HG: ICD-10-PCS | Mod: CPTII,,, | Performed by: FAMILY MEDICINE

## 2022-12-07 PROCEDURE — 3008F PR BODY MASS INDEX (BMI) DOCUMENTED: ICD-10-PCS | Mod: CPTII,,, | Performed by: FAMILY MEDICINE

## 2022-12-07 PROCEDURE — 3008F BODY MASS INDEX DOCD: CPT | Mod: CPTII,,, | Performed by: FAMILY MEDICINE

## 2022-12-07 PROCEDURE — 3066F NEPHROPATHY DOC TX: CPT | Mod: CPTII,,, | Performed by: FAMILY MEDICINE

## 2022-12-07 PROCEDURE — 99212 OFFICE O/P EST SF 10 MIN: CPT | Mod: S$PBB,,, | Performed by: FAMILY MEDICINE

## 2022-12-07 PROCEDURE — 3077F SYST BP >= 140 MM HG: CPT | Mod: CPTII,,, | Performed by: FAMILY MEDICINE

## 2022-12-07 PROCEDURE — 1159F PR MEDICATION LIST DOCUMENTED IN MEDICAL RECORD: ICD-10-PCS | Mod: CPTII,,, | Performed by: FAMILY MEDICINE

## 2022-12-07 PROCEDURE — 3044F PR MOST RECENT HEMOGLOBIN A1C LEVEL <7.0%: ICD-10-PCS | Mod: CPTII,,, | Performed by: FAMILY MEDICINE

## 2022-12-07 PROCEDURE — 3044F HG A1C LEVEL LT 7.0%: CPT | Mod: CPTII,,, | Performed by: FAMILY MEDICINE

## 2022-12-07 PROCEDURE — 3077F PR MOST RECENT SYSTOLIC BLOOD PRESSURE >= 140 MM HG: ICD-10-PCS | Mod: CPTII,,, | Performed by: FAMILY MEDICINE

## 2022-12-07 PROCEDURE — 1160F RVW MEDS BY RX/DR IN RCRD: CPT | Mod: CPTII,,, | Performed by: FAMILY MEDICINE

## 2022-12-07 PROCEDURE — 99215 OFFICE O/P EST HI 40 MIN: CPT | Mod: PBBFAC,PN | Performed by: FAMILY MEDICINE

## 2022-12-07 PROCEDURE — 1160F PR REVIEW ALL MEDS BY PRESCRIBER/CLIN PHARMACIST DOCUMENTED: ICD-10-PCS | Mod: CPTII,,, | Performed by: FAMILY MEDICINE

## 2022-12-07 PROCEDURE — 3060F PR POS MICROALBUMINURIA RESULT DOCUMENTED/REVIEW: ICD-10-PCS | Mod: CPTII,,, | Performed by: FAMILY MEDICINE

## 2022-12-07 PROCEDURE — 3060F POS MICROALBUMINURIA REV: CPT | Mod: CPTII,,, | Performed by: FAMILY MEDICINE

## 2022-12-07 NOTE — PROGRESS NOTES
Patient presents to sleep clinic for cpap follow up.  Patient's ESS is 7.  Patient gets her supplies from [employer].  Patient uses a FFM and non heated tubing.  Patient has gotten her machine in the last month.  Patient did not use machine due to having the flu.

## 2022-12-07 NOTE — PROGRESS NOTES
Subjective:       Patient ID: Glendy Engle is a 47 y.o. female.    Chief Complaint: Obesity (Treated with Cpap) and Sleep Apnea (CPAP management)    Patient follows up for CPAP management and has received a new CPAP.  Patient has had recent flu therefore her compliance is only 36.7% with an average AHI of 0.3 at a pressure of 8.5 cm H2O.  [place] score is 7.  The patient initial AHI was 5.4 by home sleep apnea testing.  The patient uses a fullface mask.  Patient needs supplies and will follow-up in 1 month.    Review of Systems   Constitutional:  Negative for fatigue.        Negative EDS   Respiratory:  Negative for apnea.    Psychiatric/Behavioral:  Negative for sleep disturbance.        Objective:      Physical Exam  Constitutional:       Appearance: She is obese.   Cardiovascular:      Rate and Rhythm: Normal rate and regular rhythm.      Heart sounds: No murmur heard.  Pulmonary:      Breath sounds: Normal breath sounds.   Skin:     General: Skin is warm and dry.   Neurological:      Mental Status: She is alert and oriented to person, place, and time.       Assessment:       Problem List Items Addressed This Visit          Endocrine    Morbid obesity       Other    SHERLYN on CPAP - Primary (Chronic)         Plan:       Continue CPAP @ 8.5 cm H20  Caution while operating a motor vehicle  Weight loss management  Prescription for new supplies  Follow up sleep clinic in 1 month.

## 2022-12-13 ENCOUNTER — HOSPITAL ENCOUNTER (OUTPATIENT)
Dept: RADIOLOGY | Facility: HOSPITAL | Age: 47
Discharge: HOME | End: 2022-12-13
Attending: SURGERY
Payer: COMMERCIAL

## 2022-12-13 ENCOUNTER — OFFICE VISIT (OUTPATIENT)
Dept: SURGERY | Facility: CLINIC | Age: 47
End: 2022-12-13
Attending: SURGERY
Payer: COMMERCIAL

## 2022-12-13 DIAGNOSIS — E04.1 THYROID NODULE: ICD-10-CM

## 2022-12-13 DIAGNOSIS — E04.1 THYROID NODULE: Primary | ICD-10-CM

## 2022-12-13 PROCEDURE — 99213 OFFICE O/P EST LOW 20 MIN: CPT | Mod: PBBFAC | Performed by: SURGERY

## 2022-12-13 PROCEDURE — 3044F HG A1C LEVEL LT 7.0%: CPT | Mod: CPTII,,, | Performed by: SURGERY

## 2022-12-13 PROCEDURE — 1159F PR MEDICATION LIST DOCUMENTED IN MEDICAL RECORD: ICD-10-PCS | Mod: CPTII,,, | Performed by: SURGERY

## 2022-12-13 PROCEDURE — 3060F PR POS MICROALBUMINURIA RESULT DOCUMENTED/REVIEW: ICD-10-PCS | Mod: CPTII,,, | Performed by: SURGERY

## 2022-12-13 PROCEDURE — 76536 US EXAM OF HEAD AND NECK: CPT | Mod: 26,,, | Performed by: RADIOLOGY

## 2022-12-13 PROCEDURE — 99214 PR OFFICE/OUTPT VISIT, EST, LEVL IV, 30-39 MIN: ICD-10-PCS | Mod: S$PBB,,, | Performed by: SURGERY

## 2022-12-13 PROCEDURE — 3044F PR MOST RECENT HEMOGLOBIN A1C LEVEL <7.0%: ICD-10-PCS | Mod: CPTII,,, | Performed by: SURGERY

## 2022-12-13 PROCEDURE — 76536 US EXAM OF HEAD AND NECK: CPT | Mod: TC

## 2022-12-13 PROCEDURE — 3066F PR DOCUMENTATION OF TREATMENT FOR NEPHROPATHY: ICD-10-PCS | Mod: CPTII,,, | Performed by: SURGERY

## 2022-12-13 PROCEDURE — 3066F NEPHROPATHY DOC TX: CPT | Mod: CPTII,,, | Performed by: SURGERY

## 2022-12-13 PROCEDURE — 1159F MED LIST DOCD IN RCRD: CPT | Mod: CPTII,,, | Performed by: SURGERY

## 2022-12-13 PROCEDURE — 99214 OFFICE O/P EST MOD 30 MIN: CPT | Mod: S$PBB,,, | Performed by: SURGERY

## 2022-12-13 PROCEDURE — 3060F POS MICROALBUMINURIA REV: CPT | Mod: CPTII,,, | Performed by: SURGERY

## 2022-12-13 PROCEDURE — 76536 US THYROID: ICD-10-PCS | Mod: 26,,, | Performed by: RADIOLOGY

## 2022-12-13 NOTE — PROGRESS NOTES
General Surgery Progress Note    Subjective:      Patient ID: Glendy Engle is a 47 y.o. female.    Chief Complaint: Thyroid Problem (1 yr u/s and f/u)      HPI:  47-year-old female here for her yearly follow-up for thyroid ultrasounds.  She denies any neck pain no lumps.  She is some very mild dysphagia which has been persistent.  She would not interested in having anything done about it.  No shortness of breath.  She has some energy decreases for which she is not being addressed currently mild weight gain as well.    Past Medical History:   Diagnosis Date    Abnormal pulmonary function test 12/2019    Abnormal radiograph     Asthma     GERD (gastroesophageal reflux disease)     Hypertension     SHERLYN on CPAP     Thyroid nodule      Past Surgical History:   Procedure Laterality Date    CHOLECYSTECTOMY      COLONOSCOPY      ESOPHAGOGASTRODUODENOSCOPY      HYSTERECTOMY      LEG SURGERY      vein surgery on right leg    REDUCTION OF BOTH BREASTS      VASCULAR SURGERY      Rt GSV Laser Ablation Phlebectomy: Dr. Sergey Esteves  2017     Social History     Socioeconomic History    Marital status:    Tobacco Use    Smoking status: Never    Smokeless tobacco: Never   Substance and Sexual Activity    Alcohol use: Not Currently    Drug use: Never    Sexual activity: Yes         Current Outpatient Medications:     albuterol (PROVENTIL/VENTOLIN HFA) 90 mcg/actuation inhaler, Inhale 2 puffs into the lungs every 6 (six) hours as needed for Wheezing. Rescue, Disp: 18 g, Rfl: 5    budesonide-formoterol 160-4.5 mcg (SYMBICORT) 160-4.5 mcg/actuation HFAA, Inhale 2 puffs into the lungs every 12 (twelve) hours. Controller, Disp: , Rfl:     cyclobenzaprine (FLEXERIL) 10 MG tablet, Take 10 mg by mouth 3 (three) times daily as needed for Muscle spasms., Disp: , Rfl:     dexlansoprazole (DEXILANT) 60 mg capsule, Dexilant 60 mg capsule, delayed release  Take one capsule by oral  route once daily, Disp: , Rfl:     famotidine (PEPCID) 40 MG tablet, Take 40 mg by mouth 2 (two) times daily., Disp: , Rfl:     fexofenadine (ALLEGRA) 180 MG tablet, Take 1 tablet (180 mg total) by mouth once daily., Disp: 90 tablet, Rfl: 3    furosemide (LASIX) 20 MG tablet, richar 1-2 tabs daily as needed for swelling.  Take potassium with Lasix/tm, Disp: 60 tablet, Rfl: 11    HYDROcodone-acetaminophen (NORCO)  mg per tablet, Take 1 tablet by mouth 2 (two) times daily., Disp: , Rfl:     lisinopriL (PRINIVIL,ZESTRIL) 20 MG tablet, Take 1 tablet (20 mg total) by mouth once daily. (Patient not taking: Reported on 12/7/2022), Disp: 90 tablet, Rfl: 3    metFORMIN (GLUCOPHAGE) 500 MG tablet, Take 1 tablet (500 mg total) by mouth 2 (two) times daily with meals., Disp: 180 tablet, Rfl: 3    ondansetron (ZOFRAN) 4 MG tablet, Take 1 tablet (4 mg total) by mouth every 8 (eight) hours as needed for Nausea., Disp: 15 tablet, Rfl: 0    pantoprazole (PROTONIX) 40 MG tablet, Take 40 mg by mouth once daily., Disp: , Rfl:     paroxetine (PAXIL) 20 MG tablet, Take 1 tablet (20 mg total) by mouth nightly., Disp: 90 tablet, Rfl: 1    phenazopyridine (PYRIDIUM) 100 MG tablet, Take 100 mg by mouth 3 (three) times daily as needed for Pain., Disp: , Rfl:     potassium chloride (MICRO-K) 10 MEQ CpSR, Take 20 mEq by mouth 2 (two) times daily. 10 mEq    2 caps   BID with Lasix, Disp: , Rfl:     simvastatin (ZOCOR) 40 MG tablet, Take 40 mg by mouth every evening., Disp: , Rfl:     sucralfate (CARAFATE) 1 gram tablet, Take 1 tablet (1 g total) by mouth 2 (two) times daily. AM & HS, Disp: 180 tablet, Rfl: 1    tolterodine (DETROL LA) 4 MG 24 hr capsule, Detrol LA 4 mg capsule,extended release  Take 1 capsule every day by oral route for 30 days., Disp: , Rfl:     Current Facility-Administered Medications:     sodium chloride 0.9% flush 10 mL, 10 mL, Intravenous, PRN, SUNI Cruz  Review of patient's allergies indicates:   Allergen  Reactions    Bactrim [sulfamethoxazole-trimethoprim] Hives       LMP 08/17/2007 (Approximate)     ROS      Objective:     Constitutional: Well, No apparent distress  Mental Status: Alert and oriented  x 3  Eyes: Normal sclera, normal eyelid  Neck: Trachea midline, no masses on neck exam  Respiratory: Clear to auscultation bilaterally with no wheezes/crackles/cough  Cardiac: Regular rate and rhythm, no murmur, rub, or gallops  Abdominal: Soft, non-tender, non-distented  Hernia: No appreciated hernias  Musculoskeletal: 5/5 strength no weakess no decreased range of montion  Neurologic: Cranial nerves II - XII intact    Labs/ Imaging: CBC:   Lab Results   Component Value Date/Time    WBC 8.27 09/23/2022 08:01 AM    RBC 4.72 09/23/2022 08:01 AM    HGB 15.0 09/23/2022 08:01 AM    HCT 43.5 09/23/2022 08:01 AM     09/23/2022 08:01 AM    MCV 92.2 09/23/2022 08:01 AM    MCH 31.8 (H) 09/23/2022 08:01 AM    MCHC 34.5 09/23/2022 08:01 AM     BMP:   Lab Results   Component Value Date/Time     09/23/2022 08:01 AM    CO2 29 09/23/2022 08:01 AM    BUN 10 09/23/2022 08:01 AM    CREATININE 0.69 09/23/2022 08:01 AM    CALCIUM 8.9 09/23/2022 08:01 AM       Ultrasound performed today showed no change in the subcentimeter bilateral thyroid nodules.    Assessment:             1. Thyroid nodule          Plan:       Orders Placed This Encounter    US Thyroid    T4    TSH     No follow-ups on file.  One year follow-up with repeat ultrasound and will order a T4 and TSH on the patient today in the outpatient lab.

## 2023-01-10 ENCOUNTER — TELEPHONE (OUTPATIENT)
Dept: SLEEP MEDICINE | Facility: CLINIC | Age: 48
End: 2023-01-10
Payer: COMMERCIAL

## 2023-01-11 ENCOUNTER — OFFICE VISIT (OUTPATIENT)
Dept: SLEEP MEDICINE | Facility: CLINIC | Age: 48
End: 2023-01-11
Payer: COMMERCIAL

## 2023-01-11 VITALS
DIASTOLIC BLOOD PRESSURE: 95 MMHG | SYSTOLIC BLOOD PRESSURE: 145 MMHG | HEART RATE: 89 BPM | HEIGHT: 63 IN | WEIGHT: 258 LBS | BODY MASS INDEX: 45.71 KG/M2 | OXYGEN SATURATION: 96 %

## 2023-01-11 DIAGNOSIS — E66.01 MORBID OBESITY: ICD-10-CM

## 2023-01-11 DIAGNOSIS — I10 PRIMARY HYPERTENSION: Chronic | ICD-10-CM

## 2023-01-11 DIAGNOSIS — G47.33 OSA ON CPAP: Primary | Chronic | ICD-10-CM

## 2023-01-11 PROCEDURE — 1159F MED LIST DOCD IN RCRD: CPT | Mod: CPTII,,, | Performed by: FAMILY MEDICINE

## 2023-01-11 PROCEDURE — 3077F SYST BP >= 140 MM HG: CPT | Mod: CPTII,,, | Performed by: FAMILY MEDICINE

## 2023-01-11 PROCEDURE — 3008F PR BODY MASS INDEX (BMI) DOCUMENTED: ICD-10-PCS | Mod: CPTII,,, | Performed by: FAMILY MEDICINE

## 2023-01-11 PROCEDURE — 1160F PR REVIEW ALL MEDS BY PRESCRIBER/CLIN PHARMACIST DOCUMENTED: ICD-10-PCS | Mod: CPTII,,, | Performed by: FAMILY MEDICINE

## 2023-01-11 PROCEDURE — 3077F PR MOST RECENT SYSTOLIC BLOOD PRESSURE >= 140 MM HG: ICD-10-PCS | Mod: CPTII,,, | Performed by: FAMILY MEDICINE

## 2023-01-11 PROCEDURE — 3080F DIAST BP >= 90 MM HG: CPT | Mod: CPTII,,, | Performed by: FAMILY MEDICINE

## 2023-01-11 PROCEDURE — 1159F PR MEDICATION LIST DOCUMENTED IN MEDICAL RECORD: ICD-10-PCS | Mod: CPTII,,, | Performed by: FAMILY MEDICINE

## 2023-01-11 PROCEDURE — 99212 PR OFFICE/OUTPT VISIT, EST, LEVL II, 10-19 MIN: ICD-10-PCS | Mod: S$PBB,,, | Performed by: FAMILY MEDICINE

## 2023-01-11 PROCEDURE — 1160F RVW MEDS BY RX/DR IN RCRD: CPT | Mod: CPTII,,, | Performed by: FAMILY MEDICINE

## 2023-01-11 PROCEDURE — 99212 OFFICE O/P EST SF 10 MIN: CPT | Mod: S$PBB,,, | Performed by: FAMILY MEDICINE

## 2023-01-11 PROCEDURE — 3080F PR MOST RECENT DIASTOLIC BLOOD PRESSURE >= 90 MM HG: ICD-10-PCS | Mod: CPTII,,, | Performed by: FAMILY MEDICINE

## 2023-01-11 PROCEDURE — 3008F BODY MASS INDEX DOCD: CPT | Mod: CPTII,,, | Performed by: FAMILY MEDICINE

## 2023-01-11 PROCEDURE — 99215 OFFICE O/P EST HI 40 MIN: CPT | Mod: PBBFAC,PN | Performed by: FAMILY MEDICINE

## 2023-01-11 NOTE — PROGRESS NOTES
Patient presents to sleep clinic for CPAP follow up. ESS is 9. Patient wears a full face mask. Patient gets supplies from [employer]. Patient has received her new machine. Reports no problems with machine.

## 2023-01-11 NOTE — PROGRESS NOTES
Subjective:       Patient ID: Glendy Engle is a 47 y.o. female.    Chief Complaint: Sleep Apnea (CPAP management)    Patient follows up in sleep clinic with a new CPAP.  Patient denies any problems with her CPAP or mask.  Compliance is 90% with an average AHI of 1.0 at a pressure of 8.5 cm H2O.  The patient is using and benefitting from CPAP.  Patient's initial AHI was 5.4 by home sleep apnea testing and her McLeod score is 9.  The patient uses a fullface mask.    Review of Systems   Constitutional:  Negative for fatigue.        Negative EDS   Respiratory:  Negative for apnea.    Psychiatric/Behavioral:  Negative for sleep disturbance.        Objective:      Physical Exam  Constitutional:       Appearance: She is obese.   Cardiovascular:      Rate and Rhythm: Normal rate and regular rhythm.      Heart sounds: No murmur heard.  Pulmonary:      Breath sounds: Normal breath sounds.   Skin:     General: Skin is warm and dry.   Neurological:      Mental Status: She is alert and oriented to person, place, and time.       Assessment:       Problem List Items Addressed This Visit          Cardiac/Vascular    Hypertension (Chronic)       Endocrine    Morbid obesity       Other    SHERLYN on CPAP - Primary (Chronic)         Plan:       Continue CPAP @ 8.5 cm H20  Caution while operating a motor vehicle  Weight loss management  Increase sleep time.  Prescription for new supplies  Follow up sleep clinic in 1 year.

## 2023-01-20 ENCOUNTER — HOSPITAL ENCOUNTER (OUTPATIENT)
Dept: RADIOLOGY | Facility: HOSPITAL | Age: 48
Discharge: HOME | End: 2023-01-20
Attending: NURSE PRACTITIONER
Payer: COMMERCIAL

## 2023-01-20 DIAGNOSIS — M25.519 PAIN IN JOINT, SHOULDER REGION: ICD-10-CM

## 2023-01-20 PROCEDURE — 73030 XR SHOULDER COMPLETE 2 OR MORE VIEWS BILATERAL: ICD-10-PCS | Mod: 26,50,, | Performed by: RADIOLOGY

## 2023-01-20 PROCEDURE — 73030 X-RAY EXAM OF SHOULDER: CPT | Mod: TC,50

## 2023-01-20 PROCEDURE — 73030 X-RAY EXAM OF SHOULDER: CPT | Mod: 26,50,, | Performed by: RADIOLOGY

## 2023-02-13 PROBLEM — Z13.1 SCREENING FOR DIABETES MELLITUS: Status: RESOLVED | Noted: 2021-11-05 | Resolved: 2023-02-13

## 2023-02-15 ENCOUNTER — OFFICE VISIT (OUTPATIENT)
Dept: FAMILY MEDICINE | Facility: CLINIC | Age: 48
End: 2023-02-15
Payer: COMMERCIAL

## 2023-02-15 VITALS
OXYGEN SATURATION: 95 % | HEIGHT: 63 IN | DIASTOLIC BLOOD PRESSURE: 77 MMHG | RESPIRATION RATE: 20 BRPM | HEART RATE: 95 BPM | SYSTOLIC BLOOD PRESSURE: 148 MMHG | BODY MASS INDEX: 45.82 KG/M2 | TEMPERATURE: 98 F | WEIGHT: 258.63 LBS

## 2023-02-15 DIAGNOSIS — K21.9 GASTROESOPHAGEAL REFLUX DISEASE WITHOUT ESOPHAGITIS: Primary | ICD-10-CM

## 2023-02-15 DIAGNOSIS — R10.9 ABDOMINAL PAIN, UNSPECIFIED ABDOMINAL LOCATION: ICD-10-CM

## 2023-02-15 DIAGNOSIS — F41.1 GENERALIZED ANXIETY DISORDER: ICD-10-CM

## 2023-02-15 LAB
BILIRUB SERPL-MCNC: NORMAL MG/DL
BLOOD URINE, POC: NORMAL
CLARITY UR: CLEAR
COLOR, POC UA: YELLOW
GLUCOSE UR QL STRIP: NORMAL
KETONES UR QL STRIP: NORMAL
LEUKOCYTE ESTERASE URINE, POC: NORMAL
NITRITE, POC UA: NORMAL
PH, POC UA: 7
PROTEIN, POC: NORMAL
SPECIFIC GRAVITY, POC UA: 1.02
UROBILINOGEN, POC UA: 0.2

## 2023-02-15 PROCEDURE — 3077F SYST BP >= 140 MM HG: CPT | Mod: CPTII,,, | Performed by: NURSE PRACTITIONER

## 2023-02-15 PROCEDURE — 99214 PR OFFICE/OUTPT VISIT, EST, LEVL IV, 30-39 MIN: ICD-10-PCS | Mod: ,,, | Performed by: NURSE PRACTITIONER

## 2023-02-15 PROCEDURE — 3008F PR BODY MASS INDEX (BMI) DOCUMENTED: ICD-10-PCS | Mod: CPTII,,, | Performed by: NURSE PRACTITIONER

## 2023-02-15 PROCEDURE — 1159F MED LIST DOCD IN RCRD: CPT | Mod: CPTII,,, | Performed by: NURSE PRACTITIONER

## 2023-02-15 PROCEDURE — 81003 POCT URINALYSIS W/O SCOPE: ICD-10-PCS | Mod: QW,,, | Performed by: NURSE PRACTITIONER

## 2023-02-15 PROCEDURE — 3008F BODY MASS INDEX DOCD: CPT | Mod: CPTII,,, | Performed by: NURSE PRACTITIONER

## 2023-02-15 PROCEDURE — 3078F DIAST BP <80 MM HG: CPT | Mod: CPTII,,, | Performed by: NURSE PRACTITIONER

## 2023-02-15 PROCEDURE — 99214 OFFICE O/P EST MOD 30 MIN: CPT | Mod: ,,, | Performed by: NURSE PRACTITIONER

## 2023-02-15 PROCEDURE — 3078F PR MOST RECENT DIASTOLIC BLOOD PRESSURE < 80 MM HG: ICD-10-PCS | Mod: CPTII,,, | Performed by: NURSE PRACTITIONER

## 2023-02-15 PROCEDURE — 81003 URINALYSIS AUTO W/O SCOPE: CPT | Mod: QW,,, | Performed by: NURSE PRACTITIONER

## 2023-02-15 PROCEDURE — 3077F PR MOST RECENT SYSTOLIC BLOOD PRESSURE >= 140 MM HG: ICD-10-PCS | Mod: CPTII,,, | Performed by: NURSE PRACTITIONER

## 2023-02-15 PROCEDURE — 1159F PR MEDICATION LIST DOCUMENTED IN MEDICAL RECORD: ICD-10-PCS | Mod: CPTII,,, | Performed by: NURSE PRACTITIONER

## 2023-02-15 RX ORDER — BUSPIRONE HYDROCHLORIDE 5 MG/1
5 TABLET ORAL 3 TIMES DAILY PRN
Qty: 90 TABLET | Refills: 11 | Status: SHIPPED | OUTPATIENT
Start: 2023-02-15 | End: 2023-07-12

## 2023-02-15 RX ORDER — DEXLANSOPRAZOLE 60 MG/1
CAPSULE, DELAYED RELEASE ORAL
Qty: 30 CAPSULE | Refills: 3 | Status: SHIPPED | OUTPATIENT
Start: 2023-02-15 | End: 2023-03-08 | Stop reason: CLARIF

## 2023-02-15 NOTE — PROGRESS NOTES
SUNI Cruz   [facility]  [address]   [place] [address]     PATIENT NAME: Glendy Engle  : 1975  DATE: 2/15/23  MRN: [id number]      Billing Provider: SUNI Cruz  Level of Service:   Patient PCP Information       Provider PCP Type    SUNI Cruz General            Reason for Visit / Chief Complaint: Abdominal Pain (Lower Abd pain), Constipation, and Nausea         History of Present Illness / Problem Focused Workflow     Glendy Engle is a 48 y.o. female presents to the clinic  with 3-4 days history of abdominal pain, bloating and nausea with dry heaves.  She has been taking Zofran for nausea.  She has been having some constipation and started taking stool softener and bowels now moving better.   She has had nasal congestion with post nasal drainge and is on her Allegra.   She has chronic problems with her stomach and is under a lot of stress with family problems.      Review of Systems     Review of Systems   Gastrointestinal:  Positive for abdominal pain.   Psychiatric/Behavioral:  The patient is nervous/anxious.       Medical / Social / Family History     Past Medical History:   Diagnosis Date    Abnormal pulmonary function test 2019    Abnormal radiograph     Asthma     GERD (gastroesophageal reflux disease)     Hypertension     SHERLYN on CPAP     Thyroid nodule        Past Surgical History:   Procedure Laterality Date    CHOLECYSTECTOMY      COLONOSCOPY      ESOPHAGOGASTRODUODENOSCOPY      HYSTERECTOMY      LEG SURGERY      vein surgery on right leg    REDUCTION OF BOTH BREASTS      VASCULAR SURGERY      Rt GSV Laser Ablation Phlebectomy: Dr. Sergey Esteves         Social History  Ms.  reports that she has never smoked. She has never used smokeless tobacco. She reports that she does not currently use alcohol. She reports that she does not use drugs.    Family History  Ms.'s family history includes Cancer in her father and maternal aunt; Diabetes in her maternal  grandmother and paternal grandmother; Heart disease in her mother and paternal grandfather; Heart failure in her mother; Hypertension in her mother.    Medications and Allergies     Medications  Outpatient Medications Marked as Taking for the 2/15/23 encounter (Office Visit) with SUNI Cruz   Medication Sig Dispense Refill    albuterol (PROVENTIL/VENTOLIN HFA) 90 mcg/actuation inhaler Inhale 2 puffs into the lungs every 6 (six) hours as needed for Wheezing. Rescue 18 g 5    budesonide-formoterol 160-4.5 mcg (SYMBICORT) 160-4.5 mcg/actuation HFAA Inhale 2 puffs into the lungs every 12 (twelve) hours. Controller      cyclobenzaprine (FLEXERIL) 10 MG tablet Take 10 mg by mouth 3 (three) times daily as needed for Muscle spasms.      famotidine (PEPCID) 40 MG tablet Take 40 mg by mouth 2 (two) times daily.      fexofenadine (ALLEGRA) 180 MG tablet Take 1 tablet (180 mg total) by mouth once daily. 90 tablet 3    furosemide (LASIX) 20 MG tablet richar 1-2 tabs daily as needed for swelling.  Take potassium with Lasix/tm 60 tablet 11    HYDROcodone-acetaminophen (NORCO)  mg per tablet Take 1 tablet by mouth 2 (two) times daily.      lisinopriL (PRINIVIL,ZESTRIL) 20 MG tablet Take 1 tablet (20 mg total) by mouth once daily. 90 tablet 3    metFORMIN (GLUCOPHAGE) 500 MG tablet TAKE 1 TABLET BY MOUTH TWICE DAILY WITH MEALS 180 tablet 1    pantoprazole (PROTONIX) 40 MG tablet Take 40 mg by mouth once daily.      paroxetine (PAXIL) 20 MG tablet Take 1 tablet (20 mg total) by mouth nightly. 90 tablet 1    potassium chloride (MICRO-K) 10 MEQ CpSR Take 20 mEq by mouth 2 (two) times daily. 10 mEq    2 caps   BID with Lasix      simvastatin (ZOCOR) 40 MG tablet Take 40 mg by mouth every evening.      sucralfate (CARAFATE) 1 gram tablet Take 1 tablet (1 g total) by mouth 2 (two) times daily. AM &  tablet 1     Current Facility-Administered Medications for the 2/15/23 encounter (Office Visit) with SUNI Cruz  "  Medication Dose Route Frequency Provider Last Rate Last Admin    sodium chloride 0.9% flush 10 mL  10 mL Intravenous PRN SUNI Cruz           Allergies  Review of patient's allergies indicates:   Allergen Reactions    Bactrim [sulfamethoxazole-trimethoprim] Hives       Physical Examination     Vitals:    02/15/23 1558 02/15/23 1559   BP: (!) 173/84 (!) 148/77   Pulse: 95    Resp: 20    Temp: 98.3 °F (36.8 °C)    TempSrc: Oral    SpO2: 95%    Weight: 117.3 kg (258 lb 9.6 oz)    Height: 5' 3" (1.6 m)       Physical Exam  Cardiovascular:      Rate and Rhythm: Normal rate and regular rhythm.      Pulses: Normal pulses.      Heart sounds: Normal heart sounds.   Pulmonary:      Effort: Pulmonary effort is normal.      Breath sounds: Normal breath sounds.   Abdominal:      Tenderness: There is abdominal tenderness (tender to palp throughout).   Skin:     General: Skin is warm and dry.   Neurological:      Mental Status: She is oriented to person, place, and time.   Psychiatric:         Mood and Affect: Mood normal.         Behavior: Behavior normal.        Assessment and Plan (including Health Maintenance)     :    Plan:  will start Buspar 5mg tid prn anxiety and will try changing to Dexilant 60mg daily ( if insurance will pay) and stop pantoprazole.  Continue with stool softeners.          Health Maintenance Due   Topic Date Due    HIV Screening  Never done       Problem List Items Addressed This Visit    None  Visit Diagnoses       Abdominal pain, unspecified abdominal location    -  Primary        .  Abdominal pain, unspecified abdominal location  -     POCT URINALYSIS W/O SCOPE       Health Maintenance Topics with due status: Not Due       Topic Last Completion Date    TETANUS VACCINE 05/30/2017    Hemoglobin A1c (Prediabetes) 09/23/2022    Colorectal Cancer Screening 11/12/2022    Lipid Panel 11/15/2022    Mammogram 11/28/2022       Procedures     Future Appointments   Date Time Provider Department Center "   4/6/2023  1:00 PM Hugo Alvarado MD [facility]   11/15/2023  9:00 AM SUNI Cruz [facility]   12/12/2023  9:30 [facility]   1/10/2024  2:45 PM Elmer Cross DO [facility] SLEEP [facility]        No follow-ups on file.     Signature:  SUNI Cruz    Date of encounter: 2/15/23

## 2023-02-21 DIAGNOSIS — K21.9 GASTROESOPHAGEAL REFLUX DISEASE WITHOUT ESOPHAGITIS: ICD-10-CM

## 2023-02-21 RX ORDER — DEXLANSOPRAZOLE 60 MG/1
CAPSULE, DELAYED RELEASE ORAL
Qty: 30 CAPSULE | Refills: 3 | Status: CANCELLED | OUTPATIENT
Start: 2023-02-21

## 2023-02-21 RX ORDER — DEXLANSOPRAZOLE 60 MG/1
60 CAPSULE, DELAYED RELEASE ORAL DAILY
Qty: 90 CAPSULE | Refills: 3 | OUTPATIENT
Start: 2023-02-21 | End: 2024-02-21

## 2023-02-21 NOTE — TELEPHONE ENCOUNTER
----- Message from Ronna Maddox sent at 2/21/2023 10:14 AM CST -----  Needs DEXILANT called in [facility]

## 2023-03-01 ENCOUNTER — OFFICE VISIT (OUTPATIENT)
Dept: FAMILY MEDICINE | Facility: CLINIC | Age: 48
End: 2023-03-01
Payer: COMMERCIAL

## 2023-03-01 VITALS
TEMPERATURE: 99 F | BODY MASS INDEX: 45.21 KG/M2 | HEIGHT: 63 IN | RESPIRATION RATE: 20 BRPM | OXYGEN SATURATION: 95 % | HEART RATE: 85 BPM | WEIGHT: 255.19 LBS | SYSTOLIC BLOOD PRESSURE: 166 MMHG | DIASTOLIC BLOOD PRESSURE: 77 MMHG

## 2023-03-01 DIAGNOSIS — R21 RASH: Primary | ICD-10-CM

## 2023-03-01 PROCEDURE — 3077F PR MOST RECENT SYSTOLIC BLOOD PRESSURE >= 140 MM HG: ICD-10-PCS | Mod: CPTII,,, | Performed by: NURSE PRACTITIONER

## 2023-03-01 PROCEDURE — 3008F BODY MASS INDEX DOCD: CPT | Mod: CPTII,,, | Performed by: NURSE PRACTITIONER

## 2023-03-01 PROCEDURE — 3078F PR MOST RECENT DIASTOLIC BLOOD PRESSURE < 80 MM HG: ICD-10-PCS | Mod: CPTII,,, | Performed by: NURSE PRACTITIONER

## 2023-03-01 PROCEDURE — 96372 PR INJECTION,THERAP/PROPH/DIAG2ST, IM OR SUBCUT: ICD-10-PCS | Mod: ,,, | Performed by: NURSE PRACTITIONER

## 2023-03-01 PROCEDURE — 1159F PR MEDICATION LIST DOCUMENTED IN MEDICAL RECORD: ICD-10-PCS | Mod: CPTII,,, | Performed by: NURSE PRACTITIONER

## 2023-03-01 PROCEDURE — 99213 PR OFFICE/OUTPT VISIT, EST, LEVL III, 20-29 MIN: ICD-10-PCS | Mod: 25,,, | Performed by: NURSE PRACTITIONER

## 2023-03-01 PROCEDURE — 3078F DIAST BP <80 MM HG: CPT | Mod: CPTII,,, | Performed by: NURSE PRACTITIONER

## 2023-03-01 PROCEDURE — 99213 OFFICE O/P EST LOW 20 MIN: CPT | Mod: 25,,, | Performed by: NURSE PRACTITIONER

## 2023-03-01 PROCEDURE — 96372 THER/PROPH/DIAG INJ SC/IM: CPT | Mod: ,,, | Performed by: NURSE PRACTITIONER

## 2023-03-01 PROCEDURE — 3008F PR BODY MASS INDEX (BMI) DOCUMENTED: ICD-10-PCS | Mod: CPTII,,, | Performed by: NURSE PRACTITIONER

## 2023-03-01 PROCEDURE — 1159F MED LIST DOCD IN RCRD: CPT | Mod: CPTII,,, | Performed by: NURSE PRACTITIONER

## 2023-03-01 PROCEDURE — 3077F SYST BP >= 140 MM HG: CPT | Mod: CPTII,,, | Performed by: NURSE PRACTITIONER

## 2023-03-01 RX ORDER — DIPHENHYDRAMINE HYDROCHLORIDE 50 MG/ML
25 INJECTION INTRAMUSCULAR; INTRAVENOUS
Status: COMPLETED | OUTPATIENT
Start: 2023-03-01 | End: 2023-03-01

## 2023-03-01 RX ADMIN — DIPHENHYDRAMINE HYDROCHLORIDE 25 MG: 50 INJECTION INTRAMUSCULAR; INTRAVENOUS at 02:03

## 2023-03-01 NOTE — PROGRESS NOTES
SUNI Cruz   [facility]  [address]   [place] [address]     PATIENT NAME: Glendy Engle  : 1975  DATE: 3/1/23  MRN: [id number]      Billing Provider: SUNI Cruz  Level of Service:   Patient PCP Information       Provider PCP Type    SUNI Cruz General            Reason for Visit / Chief Complaint: Rash (Has had a red hot itchy rash x several days and wondering if it is related to the new anxiety medication)         History of Present Illness / Problem Focused Workflow     Glendy Engle is a 48 y.o. female presents to the clinic  with  breaking out with redness and feeling hot with no rash for the past several days.  She had steroid  epidural injection yesterday but  was already breaking out .    She had similar episode  last year  after  being exposed to Round up and had previous reaction a few years earlier.     Seh does recall eating a pre packaged sliced apple   the day she broke out.       Review of Systems     Review of Systems   Integumentary:         Red flushing noted on face, chest, upper extremities.  No rash noted.      Medical / Social / Family History     Past Medical History:   Diagnosis Date    Abnormal pulmonary function test 2019    Abnormal radiograph     Asthma     GERD (gastroesophageal reflux disease)     Hypertension     SHERLYN on CPAP     Thyroid nodule        Past Surgical History:   Procedure Laterality Date    CHOLECYSTECTOMY      COLONOSCOPY      ESOPHAGOGASTRODUODENOSCOPY      HYSTERECTOMY      LEG SURGERY      vein surgery on right leg    REDUCTION OF BOTH BREASTS      VASCULAR SURGERY      Rt GSV Laser Ablation Phlebectomy: Dr. Sergey Esteves         Social History  Ms.  reports that she has never smoked. She has never used smokeless tobacco. She reports that she does not currently use alcohol. She reports that she does not use drugs.    Family History  Ms.'s family history includes Cancer in her father and maternal aunt; Diabetes in her  maternal grandmother and paternal grandmother; Heart disease in her mother and paternal grandfather; Heart failure in her mother; Hypertension in her mother.    Medications and Allergies     Medications  Outpatient Medications Marked as Taking for the 3/1/23 encounter (Office Visit) with SUIN Cruz   Medication Sig Dispense Refill    albuterol (PROVENTIL/VENTOLIN HFA) 90 mcg/actuation inhaler Inhale 2 puffs into the lungs every 6 (six) hours as needed for Wheezing. Rescue 18 g 5    budesonide-formoterol 160-4.5 mcg (SYMBICORT) 160-4.5 mcg/actuation HFAA Inhale 2 puffs into the lungs every 12 (twelve) hours. Controller      busPIRone (BUSPAR) 5 MG Tab Take 1 tablet (5 mg total) by mouth 3 (three) times daily as needed (ANXIETY). 90 tablet 11    cyclobenzaprine (FLEXERIL) 10 MG tablet Take 10 mg by mouth 3 (three) times daily as needed for Muscle spasms.      dexlansoprazole (DEXILANT) 60 mg capsule Dexilant 60 mg capsule, delayed release   Take one capsule by oral route once daily 30 capsule 3    famotidine (PEPCID) 40 MG tablet Take 40 mg by mouth 2 (two) times daily.      fexofenadine (ALLEGRA) 180 MG tablet Take 1 tablet (180 mg total) by mouth once daily. 90 tablet 3    furosemide (LASIX) 20 MG tablet richar 1-2 tabs daily as needed for swelling.  Take potassium with Lasix/tm 60 tablet 11    HYDROcodone-acetaminophen (NORCO)  mg per tablet Take 1 tablet by mouth 2 (two) times daily.      lisinopriL (PRINIVIL,ZESTRIL) 20 MG tablet Take 1 tablet (20 mg total) by mouth once daily. 90 tablet 3    metFORMIN (GLUCOPHAGE) 500 MG tablet TAKE 1 TABLET BY MOUTH TWICE DAILY WITH MEALS 180 tablet 1    pantoprazole (PROTONIX) 40 MG tablet Take 40 mg by mouth once daily.      simvastatin (ZOCOR) 40 MG tablet Take 40 mg by mouth every evening.      sucralfate (CARAFATE) 1 gram tablet Take 1 tablet (1 g total) by mouth 2 (two) times daily. AM &  tablet 1     Current Facility-Administered Medications for the 3/1/23  "encounter (Office Visit) with SUNI Cruz   Medication Dose Route Frequency Provider Last Rate Last Admin    sodium chloride 0.9% flush 10 mL  10 mL Intravenous PRN SUNI Cruz           Allergies  Review of patient's allergies indicates:   Allergen Reactions    Bactrim [sulfamethoxazole-trimethoprim] Hives       Physical Examination     Vitals:    03/01/23 1317   BP: (!) 166/77   Pulse: 85   Resp: 20   Temp: 98.7 °F (37.1 °C)   TempSrc: Oral   SpO2: 95%   Weight: 115.8 kg (255 lb 3.2 oz)   Height: 5' 3" (1.6 m)      Physical Exam  Cardiovascular:      Rate and Rhythm: Normal rate and regular rhythm.      Pulses: Normal pulses.      Heart sounds: Normal heart sounds.   Pulmonary:      Effort: Pulmonary effort is normal.      Breath sounds: Normal breath sounds.   Skin:     General: Skin is warm and dry.      Comments: Red flushing noted of face, chest, and upper extremities with no rash noted.        Assessment and Plan (including Health Maintenance)     :    Plan:  will get Benadryl  50mg IM now and advised to continue with allegra daily.  Avoid any foods with preservative.  Advised to make sure she uses  her cpap due to drowsiness associated with benadryl.        Health Maintenance Due   Topic Date Due    HIV Screening  Never done       Problem List Items Addressed This Visit    None  .  There are no diagnoses linked to this encounter.   Health Maintenance Topics with due status: Not Due       Topic Last Completion Date    TETANUS VACCINE 05/30/2017    Hemoglobin A1c (Prediabetes) 09/23/2022    Colorectal Cancer Screening 11/12/2022    Lipid Panel 11/15/2022    Mammogram 11/28/2022       Procedures     Future Appointments   Date Time Provider Department Center   4/6/2023  1:00 PM Hugo Alvarado MD [facility]   11/15/2023  9:00 AM SUNI Cruz [facility] Jorge Storm   12/12/2023  9:30 AM [facility]   1/10/2024  2:45 PM Elmer Cross, DO [facility] SLEEP " Michel Romo        No follow-ups on file.     Signature:  SUNI Cruz    Date of encounter: 3/1/23

## 2023-03-08 ENCOUNTER — OFFICE VISIT (OUTPATIENT)
Dept: FAMILY MEDICINE | Facility: CLINIC | Age: 48
End: 2023-03-08
Payer: COMMERCIAL

## 2023-03-08 VITALS
SYSTOLIC BLOOD PRESSURE: 131 MMHG | TEMPERATURE: 98 F | WEIGHT: 257.69 LBS | HEIGHT: 63 IN | OXYGEN SATURATION: 97 % | DIASTOLIC BLOOD PRESSURE: 80 MMHG | RESPIRATION RATE: 20 BRPM | BODY MASS INDEX: 45.66 KG/M2 | HEART RATE: 79 BPM

## 2023-03-08 DIAGNOSIS — M54.16 LUMBAR BACK PAIN WITH RADICULOPATHY AFFECTING RIGHT LOWER EXTREMITY: Primary | ICD-10-CM

## 2023-03-08 PROCEDURE — 3008F BODY MASS INDEX DOCD: CPT | Mod: CPTII,,, | Performed by: NURSE PRACTITIONER

## 2023-03-08 PROCEDURE — 3008F PR BODY MASS INDEX (BMI) DOCUMENTED: ICD-10-PCS | Mod: CPTII,,, | Performed by: NURSE PRACTITIONER

## 2023-03-08 PROCEDURE — 1159F PR MEDICATION LIST DOCUMENTED IN MEDICAL RECORD: ICD-10-PCS | Mod: CPTII,,, | Performed by: NURSE PRACTITIONER

## 2023-03-08 PROCEDURE — 3075F SYST BP GE 130 - 139MM HG: CPT | Mod: CPTII,,, | Performed by: NURSE PRACTITIONER

## 2023-03-08 PROCEDURE — 3075F PR MOST RECENT SYSTOLIC BLOOD PRESS GE 130-139MM HG: ICD-10-PCS | Mod: CPTII,,, | Performed by: NURSE PRACTITIONER

## 2023-03-08 PROCEDURE — 99213 OFFICE O/P EST LOW 20 MIN: CPT | Mod: ,,, | Performed by: NURSE PRACTITIONER

## 2023-03-08 PROCEDURE — 3079F DIAST BP 80-89 MM HG: CPT | Mod: CPTII,,, | Performed by: NURSE PRACTITIONER

## 2023-03-08 PROCEDURE — 3079F PR MOST RECENT DIASTOLIC BLOOD PRESSURE 80-89 MM HG: ICD-10-PCS | Mod: CPTII,,, | Performed by: NURSE PRACTITIONER

## 2023-03-08 PROCEDURE — 99213 PR OFFICE/OUTPT VISIT, EST, LEVL III, 20-29 MIN: ICD-10-PCS | Mod: ,,, | Performed by: NURSE PRACTITIONER

## 2023-03-08 PROCEDURE — 1159F MED LIST DOCD IN RCRD: CPT | Mod: CPTII,,, | Performed by: NURSE PRACTITIONER

## 2023-03-08 RX ORDER — TIZANIDINE 4 MG/1
4 TABLET ORAL EVERY 8 HOURS
Qty: 60 TABLET | Refills: 1 | Status: SHIPPED | OUTPATIENT
Start: 2023-03-08 | End: 2023-03-18

## 2023-03-08 RX ORDER — LANSOPRAZOLE 30 MG/1
30 CAPSULE, DELAYED RELEASE ORAL DAILY
Qty: 30 CAPSULE | Refills: 11 | Status: SHIPPED | OUTPATIENT
Start: 2023-03-08 | End: 2023-07-12

## 2023-03-08 RX ORDER — GABAPENTIN 300 MG/1
300 CAPSULE ORAL 3 TIMES DAILY
Qty: 90 CAPSULE | Refills: 1 | Status: SHIPPED | OUTPATIENT
Start: 2023-03-08 | End: 2023-08-16

## 2023-03-08 NOTE — PATIENT INSTRUCTIONS
will stop Flexeril and try Zanaflex 4mg  tid rpn muscle spasms and start  gabapentin 300mg tid for back pain.    Encouraged to use  heat/ice packs for pain relief. Stretching exercises.

## 2023-03-08 NOTE — PROGRESS NOTES
"   SUNI Cruz   [facility]  [address]   [place] [address]     PATIENT NAME: Glendy Engle  : 1975  DATE: 3/8/23  MRN: [id number]      Billing Provider: SUNI Cruz  Level of Service:   Patient PCP Information       Provider PCP Type    SUNI Cruz General            Reason for Visit / Chief Complaint: Back Pain (Mid back pain that began Saturday; related the pain as "knotting" up and not letting go. Saw pain management 2023 for injection and has a follow up appt 2023. Was told that they were unable to see her any sooner and to f/u with pcp.)       History of Present Illness / Problem Focused Workflow     Glendy Engle is a 48 y.o. female presents to the clinic She noted pain worsened on 3/4/23 (injections done 23)--she is taking Norco three times daily  along with flexeril tid  which is not helping at all.  Pain is made worse with any activity and  she has been propping up with pillows, etc for positioning.  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   She  has been taking gabapentin 600mg at lunch that is her 's but causes excess drowsiness  but does help with the pain.   She has not used ice or heat except when bathing.        Review of Systems     Review of Systems   Constitutional:  Negative for fatigue.   HENT:  Negative for nasal congestion and sore throat.    Respiratory:  Negative for cough, chest tightness and shortness of breath.    Cardiovascular:  Negative for chest pain, palpitations and leg swelling.   Gastrointestinal:  Negative for nausea, vomiting and reflux.   Musculoskeletal:  Positive for back pain (tender to palp lower thoracic and upper lumbar spine).   Neurological:  Negative for weakness and memory loss.   Psychiatric/Behavioral:  Negative for confusion and sleep disturbance.       Medical / Social / Family History     Past Medical History:   Diagnosis Date    Abnormal pulmonary function test 2019    Abnormal radiograph "     Asthma     GERD (gastroesophageal reflux disease)     Hypertension     SHERLYN on CPAP     Thyroid nodule        Past Surgical History:   Procedure Laterality Date    CHOLECYSTECTOMY      COLONOSCOPY      ESOPHAGOGASTRODUODENOSCOPY      HYSTERECTOMY      LEG SURGERY      vein surgery on right leg    REDUCTION OF BOTH BREASTS      VASCULAR SURGERY      Rt GSV Laser Ablation Phlebectomy: Dr. Sergey Esteves  2017       Social History  Ms.  reports that she has never smoked. She has never used smokeless tobacco. She reports that she does not currently use alcohol. She reports that she does not use drugs.    Family History  Ms.'s family history includes Cancer in her father and maternal aunt; Diabetes in her maternal grandmother and paternal grandmother; Heart disease in her mother and paternal grandfather; Heart failure in her mother; Hypertension in her mother.    Medications and Allergies     Medications  Outpatient Medications Marked as Taking for the 3/8/23 encounter (Office Visit) with SUNI Cruz   Medication Sig Dispense Refill    albuterol (PROVENTIL/VENTOLIN HFA) 90 mcg/actuation inhaler Inhale 2 puffs into the lungs every 6 (six) hours as needed for Wheezing. Rescue 18 g 5    budesonide-formoterol 160-4.5 mcg (SYMBICORT) 160-4.5 mcg/actuation HFAA Inhale 2 puffs into the lungs every 12 (twelve) hours. Controller      busPIRone (BUSPAR) 5 MG Tab Take 1 tablet (5 mg total) by mouth 3 (three) times daily as needed (ANXIETY). 90 tablet 11    cyclobenzaprine (FLEXERIL) 10 MG tablet Take 10 mg by mouth 3 (three) times daily as needed for Muscle spasms.      famotidine (PEPCID) 40 MG tablet Take 40 mg by mouth 2 (two) times daily.      fexofenadine (ALLEGRA) 180 MG tablet Take 1 tablet (180 mg total) by mouth once daily. 90 tablet 3    furosemide (LASIX) 20 MG tablet richar 1-2 tabs daily as needed for swelling.  Take potassium with Lasix/tm 60 tablet 11    HYDROcodone-acetaminophen (NORCO)  mg per tablet Take 1  "tablet by mouth 2 (two) times daily.      lisinopriL (PRINIVIL,ZESTRIL) 20 MG tablet Take 1 tablet (20 mg total) by mouth once daily. 90 tablet 3    metFORMIN (GLUCOPHAGE) 500 MG tablet TAKE 1 TABLET BY MOUTH TWICE DAILY WITH MEALS 180 tablet 1    pantoprazole (PROTONIX) 40 MG tablet Take 40 mg by mouth once daily.      simvastatin (ZOCOR) 40 MG tablet Take 40 mg by mouth every evening.      sucralfate (CARAFATE) 1 gram tablet Take 1 tablet (1 g total) by mouth 2 (two) times daily. AM &  tablet 1     Current Facility-Administered Medications for the 3/8/23 encounter (Office Visit) with SUNI Cruz   Medication Dose Route Frequency Provider Last Rate Last Admin    sodium chloride 0.9% flush 10 mL  10 mL Intravenous PRN SUNI Cruz           Allergies  Review of patient's allergies indicates:   Allergen Reactions    Bactrim [sulfamethoxazole-trimethoprim] Hives       Physical Examination     Vitals:    03/08/23 1253   BP: 131/80   Pulse: 79   Resp: 20   Temp: 98.1 °F (36.7 °C)   TempSrc: Oral   SpO2: 97%   Weight: 116.9 kg (257 lb 11.2 oz)   Height: 5' 3" (1.6 m)      Physical Exam  Constitutional:       Appearance: Normal appearance.   Cardiovascular:      Rate and Rhythm: Normal rate and regular rhythm.      Pulses: Normal pulses.      Heart sounds: Normal heart sounds.   Pulmonary:      Effort: Pulmonary effort is normal.      Breath sounds: Normal breath sounds.   Musculoskeletal:      Comments: Tender to palp along  mid spine (upper  lumbar-lower thoracic)   Neurological:      Mental Status: She is alert and oriented to person, place, and time.        Assessment and Plan (including Health Maintenance)     :    Plan:  will stop Flexeril and try Zanaflex 4mg  tid rpn muscle spasms and start  gabapentin 300mg tid for back pain.    Encouraged to use  heat/ice packs for pain relief. Stretching exercises.  3pm  Received PA denial for dexilant and recommendation to use lansoprazole first with dose up to " 60mg per one capsule  per day.  Pt advised to stop current PPI and will send in lansoprazole/tm        Health Maintenance Due   Topic Date Due    HIV Screening  Never done       Problem List Items Addressed This Visit          Neuro    Lumbar back pain with radiculopathy affecting right lower extremity - Primary   .  Lumbar back pain with radiculopathy affecting right lower extremity  -     tiZANidine (ZANAFLEX) 4 MG tablet; Take 1 tablet (4 mg total) by mouth every 8 (eight) hours. for 10 days  Dispense: 60 tablet; Refill: 1  -     gabapentin (NEURONTIN) 300 MG capsule; Take 1 capsule (300 mg total) by mouth 3 (three) times daily.  Dispense: 90 capsule; Refill: 1       Health Maintenance Topics with due status: Not Due       Topic Last Completion Date    TETANUS VACCINE 05/30/2017    Hemoglobin A1c (Prediabetes) 09/23/2022    Colorectal Cancer Screening 11/12/2022    Lipid Panel 11/15/2022    Mammogram 11/28/2022       Procedures     Future Appointments   Date Time Provider Department Center   4/6/2023  1:00 PM Hugo Alvarado MD [facility]   11/15/2023  9:00 AM SUNI Cruz [facility] Patel Storm   12/12/2023  9:30 AM [facility]    1/10/2024  2:45 PM Elmer Cross DO [facility] Romo        Follow up if symptoms worsen or fail to improve.     Signature:  SUNI Cruz    Date of encounter: 3/8/23

## 2023-03-10 ENCOUNTER — HOSPITAL ENCOUNTER (EMERGENCY)
Facility: HOSPITAL | Age: 48
Discharge: HOME | End: 2023-03-10
Payer: COMMERCIAL

## 2023-03-10 VITALS
HEIGHT: 63 IN | HEART RATE: 81 BPM | BODY MASS INDEX: 46.07 KG/M2 | SYSTOLIC BLOOD PRESSURE: 162 MMHG | DIASTOLIC BLOOD PRESSURE: 82 MMHG | TEMPERATURE: 98 F | OXYGEN SATURATION: 99 % | WEIGHT: 260 LBS | RESPIRATION RATE: 20 BRPM

## 2023-03-10 DIAGNOSIS — G89.29 CHRONIC LEFT-SIDED THORACIC BACK PAIN: Primary | ICD-10-CM

## 2023-03-10 DIAGNOSIS — M54.6 CHRONIC LEFT-SIDED THORACIC BACK PAIN: Primary | ICD-10-CM

## 2023-03-10 LAB — GLUCOSE SERPL-MCNC: 85 MG/DL (ref 70–105)

## 2023-03-10 PROCEDURE — 99284 EMERGENCY DEPT VISIT MOD MDM: CPT | Mod: ,,, | Performed by: REGISTERED NURSE

## 2023-03-10 PROCEDURE — 96372 THER/PROPH/DIAG INJ SC/IM: CPT | Performed by: REGISTERED NURSE

## 2023-03-10 PROCEDURE — 82962 GLUCOSE BLOOD TEST: CPT

## 2023-03-10 PROCEDURE — 99284 PR EMERGENCY DEPT VISIT,LEVEL IV: ICD-10-PCS | Mod: ,,, | Performed by: REGISTERED NURSE

## 2023-03-10 PROCEDURE — 63600175 PHARM REV CODE 636 W HCPCS: Performed by: REGISTERED NURSE

## 2023-03-10 PROCEDURE — 99284 EMERGENCY DEPT VISIT MOD MDM: CPT

## 2023-03-10 RX ORDER — KETOROLAC TROMETHAMINE 30 MG/ML
60 INJECTION, SOLUTION INTRAMUSCULAR; INTRAVENOUS
Status: COMPLETED | OUTPATIENT
Start: 2023-03-10 | End: 2023-03-10

## 2023-03-10 RX ORDER — KETOROLAC TROMETHAMINE 30 MG/ML
INJECTION, SOLUTION INTRAMUSCULAR; INTRAVENOUS
Status: DISCONTINUED
Start: 2023-03-10 | End: 2023-03-11 | Stop reason: HOSPADM

## 2023-03-10 RX ORDER — DEXAMETHASONE SODIUM PHOSPHATE 4 MG/ML
4 INJECTION, SOLUTION INTRA-ARTICULAR; INTRALESIONAL; INTRAMUSCULAR; INTRAVENOUS; SOFT TISSUE
Status: COMPLETED | OUTPATIENT
Start: 2023-03-10 | End: 2023-03-10

## 2023-03-10 RX ORDER — METHYLPREDNISOLONE ACETATE 40 MG/ML
40 INJECTION, SUSPENSION INTRA-ARTICULAR; INTRALESIONAL; INTRAMUSCULAR; SOFT TISSUE
Status: COMPLETED | OUTPATIENT
Start: 2023-03-10 | End: 2023-03-10

## 2023-03-10 RX ADMIN — KETOROLAC TROMETHAMINE 60 MG: 30 INJECTION, SOLUTION INTRAMUSCULAR; INTRAVENOUS at 07:03

## 2023-03-10 RX ADMIN — DEXAMETHASONE SODIUM PHOSPHATE 4 MG: 4 INJECTION, SOLUTION INTRAMUSCULAR; INTRAVENOUS at 07:03

## 2023-03-10 RX ADMIN — METHYLPREDNISOLONE ACETATE 40 MG: 40 INJECTION, SUSPENSION INTRA-ARTICULAR; INTRALESIONAL; INTRAMUSCULAR; INTRASYNOVIAL; SOFT TISSUE at 07:03

## 2023-03-11 NOTE — DISCHARGE INSTRUCTIONS
Follow up with your Pain Treatment Doctor. Return for any new or worsening symptoms or otherwise as needed.

## 2023-03-11 NOTE — ED PROVIDER NOTES
Encounter Date: 3/10/2023       History     Chief Complaint   Patient presents with    Back Pain     48 y-old  female received with a 2-3 day history of mid back pain. Patient states that she has chronic back pain and that she recently had an injection by her pain treatment doctor. Patient states that her last injection did not help her pain. Denies any problems with bowel or bladder. No symptoms of sciatica. No other complaints voiced.     Review of patient's allergies indicates:   Allergen Reactions    Bactrim [sulfamethoxazole-trimethoprim] Hives     Past Medical History:   Diagnosis Date    Abnormal pulmonary function test 12/2019    Abnormal radiograph     Asthma     GERD (gastroesophageal reflux disease)     Hypertension     SHERLYN on CPAP     Thyroid nodule      Past Surgical History:   Procedure Laterality Date    CHOLECYSTECTOMY      COLONOSCOPY      ESOPHAGOGASTRODUODENOSCOPY      HYSTERECTOMY      LEG SURGERY      vein surgery on right leg    REDUCTION OF BOTH BREASTS      VASCULAR SURGERY      Rt GSV Laser Ablation Phlebectomy: Dr. Sergey Esteves  2017     Family History   Problem Relation Age of Onset    Heart failure Mother     Heart disease Mother     Hypertension Mother     Cancer Father     Cancer Maternal Aunt     Diabetes Maternal Grandmother     Diabetes Paternal Grandmother     Heart disease Paternal Grandfather      Social History     Tobacco Use    Smoking status: Never    Smokeless tobacco: Never   Substance Use Topics    Alcohol use: Not Currently    Drug use: Never     Review of Systems   Constitutional: Negative.    HENT: Negative.     Eyes: Negative.    Respiratory: Negative.     Cardiovascular: Negative.    Gastrointestinal: Negative.    Endocrine: Negative.    Genitourinary: Negative.    Musculoskeletal: Negative.    Skin: Negative.    Allergic/Immunologic: Negative.    Neurological: Negative.    Hematological: Negative.    Psychiatric/Behavioral: Negative.       Physical Exam      Initial Vitals [03/10/23 1823]   BP Pulse Resp Temp SpO2   (!) 162/82 81 20 97.8 °F (36.6 °C) 99 %      MAP       --         Physical Exam    Nursing note and vitals reviewed.  Constitutional: She appears well-developed and well-nourished. She is not diaphoretic. No distress.   HENT:   Head: Normocephalic.   Right Ear: External ear normal.   Left Ear: External ear normal.   Nose: Nose normal.   Mouth/Throat: Oropharynx is clear and moist.   Eyes: Conjunctivae are normal.   Neck: Neck supple.   Normal range of motion.  Cardiovascular:  Normal rate, regular rhythm, normal heart sounds and intact distal pulses.           Pulmonary/Chest: Breath sounds normal.   Abdominal: Abdomen is soft. Bowel sounds are normal.   Musculoskeletal:         General: Normal range of motion.      Cervical back: Normal range of motion and neck supple.     Neurological: She is alert and oriented to person, place, and time. She has normal strength. GCS score is 15. GCS eye subscore is 4. GCS verbal subscore is 5. GCS motor subscore is 6.   Skin: Skin is warm and dry. Capillary refill takes less than 2 seconds.   Psychiatric: She has a normal mood and affect. Her behavior is normal. Judgment and thought content normal.       Medical Screening Exam   See Full Note    ED Course   Procedures  Labs Reviewed   POCT GLUCOSE MONITORING CONTINUOUS          Imaging Results    None          Medications   ketorolac injection 60 mg (has no administration in time range)   dexAMETHasone injection 4 mg (has no administration in time range)   methylPREDNISolone acetate injection 40 mg (has no administration in time range)     Medical Decision Making:   Initial Assessment:   48 y-old presents with mid back pain with chronic back pain.   Differential Diagnosis:   -back pain  -Contusion  -  ED Management:  Patient states that she recently received an injection into her back by her pain treatment doctor on 02/28/2023. Denies any problems with bowel or  bladder. No other complaints voiced. Recommend that the patient f/u with her pain treatment provider for further eval and management. I also discussed weight loss to help her pain. Patient verbalizes understanding and agrees with plan.                  Clinical Impression:   Final diagnoses:  [M54.6, G89.29] Chronic left-sided thoracic back pain (Primary)        ED Disposition Condition    Discharge Stable          ED Prescriptions    None       Follow-up Information       Follow up With Specialties Details Why Contact Info    Your Pain Treatment Doctor  Schedule an appointment as soon as possible for a visit                ELICIA Jensen  03/10/23 4078

## 2023-03-11 NOTE — ED TRIAGE NOTES
47 YO FE TO ER WITH C/O MID BCK PAIN.  PT REPORTS SHE IS BEING SEEN BY THE PAIN CENTER IN [place] AND HAD HER BACK INJECTED ON 2/28.  SHE CALLED THE PAIN CENTER ABOUT PAIN AND THEY TOLD HER SHE HAD TO WAIT UNTIL HER NEXT APPT WHICH IS THE END OF THE MONTH.  SHE WENT TO PCP AND SHE PUT HER ON ZANAFLEX.  PT DENIES TRAUMA OR HEAVY LIFTING.  TOOK NORCO 10MG AND ZANAFLEX AT 1200 TODAY WITH MINIMAL RELIEF

## 2023-03-20 ENCOUNTER — TELEPHONE (OUTPATIENT)
Dept: FAMILY MEDICINE | Facility: CLINIC | Age: 48
End: 2023-03-20
Payer: COMMERCIAL

## 2023-03-20 DIAGNOSIS — J45.30 MILD PERSISTENT ASTHMA WITHOUT COMPLICATION: ICD-10-CM

## 2023-03-20 NOTE — TELEPHONE ENCOUNTER
----- Message from Ronna Maddox sent at 3/17/2023 11:29 AM CDT -----  Her acid reflux medication is not working and needs a call back [phone]

## 2023-04-06 ENCOUNTER — OFFICE VISIT (OUTPATIENT)
Dept: PULMONOLOGY | Facility: CLINIC | Age: 48
End: 2023-04-06
Payer: COMMERCIAL

## 2023-04-06 VITALS
WEIGHT: 256 LBS | HEART RATE: 69 BPM | OXYGEN SATURATION: 99 % | DIASTOLIC BLOOD PRESSURE: 90 MMHG | HEIGHT: 63 IN | SYSTOLIC BLOOD PRESSURE: 185 MMHG | BODY MASS INDEX: 45.36 KG/M2

## 2023-04-06 DIAGNOSIS — J45.30 MILD PERSISTENT ASTHMA WITHOUT COMPLICATION: ICD-10-CM

## 2023-04-06 PROCEDURE — 3008F BODY MASS INDEX DOCD: CPT | Mod: CPTII,,, | Performed by: INTERNAL MEDICINE

## 2023-04-06 PROCEDURE — 1159F MED LIST DOCD IN RCRD: CPT | Mod: CPTII,,, | Performed by: INTERNAL MEDICINE

## 2023-04-06 PROCEDURE — 3080F PR MOST RECENT DIASTOLIC BLOOD PRESSURE >= 90 MM HG: ICD-10-PCS | Mod: CPTII,,, | Performed by: INTERNAL MEDICINE

## 2023-04-06 PROCEDURE — 1159F PR MEDICATION LIST DOCUMENTED IN MEDICAL RECORD: ICD-10-PCS | Mod: CPTII,,, | Performed by: INTERNAL MEDICINE

## 2023-04-06 PROCEDURE — 3077F PR MOST RECENT SYSTOLIC BLOOD PRESSURE >= 140 MM HG: ICD-10-PCS | Mod: CPTII,,, | Performed by: INTERNAL MEDICINE

## 2023-04-06 PROCEDURE — 3077F SYST BP >= 140 MM HG: CPT | Mod: CPTII,,, | Performed by: INTERNAL MEDICINE

## 2023-04-06 PROCEDURE — 3080F DIAST BP >= 90 MM HG: CPT | Mod: CPTII,,, | Performed by: INTERNAL MEDICINE

## 2023-04-06 PROCEDURE — 99214 OFFICE O/P EST MOD 30 MIN: CPT | Mod: PBBFAC | Performed by: INTERNAL MEDICINE

## 2023-04-06 PROCEDURE — 99213 OFFICE O/P EST LOW 20 MIN: CPT | Mod: S$PBB,,, | Performed by: INTERNAL MEDICINE

## 2023-04-06 PROCEDURE — 99213 PR OFFICE/OUTPT VISIT, EST, LEVL III, 20-29 MIN: ICD-10-PCS | Mod: S$PBB,,, | Performed by: INTERNAL MEDICINE

## 2023-04-06 PROCEDURE — 3008F PR BODY MASS INDEX (BMI) DOCUMENTED: ICD-10-PCS | Mod: CPTII,,, | Performed by: INTERNAL MEDICINE

## 2023-04-06 RX ORDER — ALBUTEROL SULFATE 90 UG/1
2 AEROSOL, METERED RESPIRATORY (INHALATION) EVERY 6 HOURS PRN
Qty: 18 G | Refills: 5 | Status: SHIPPED | OUTPATIENT
Start: 2023-04-06 | End: 2023-08-16 | Stop reason: SDUPTHER

## 2023-04-06 RX ORDER — DEXLANSOPRAZOLE 60 MG/1
CAPSULE, DELAYED RELEASE ORAL
COMMUNITY
Start: 2023-04-03 | End: 2024-02-27 | Stop reason: SDUPTHER

## 2023-04-06 RX ORDER — TIZANIDINE 4 MG/1
TABLET ORAL
COMMUNITY
End: 2023-08-16

## 2023-04-06 RX ORDER — BUDESONIDE AND FORMOTEROL FUMARATE DIHYDRATE 160; 4.5 UG/1; UG/1
2 AEROSOL RESPIRATORY (INHALATION) EVERY 12 HOURS
Qty: 10.2 G | Refills: 5 | Status: SHIPPED | OUTPATIENT
Start: 2023-04-06

## 2023-04-06 NOTE — ASSESSMENT & PLAN NOTE
Symptoms are under good control no changes continue Symbicort and Ventolin see her back on a p.r.n. basis if her symptoms changes she seems to be doing well he is increase activity lose weight

## 2023-04-06 NOTE — PROGRESS NOTES
Subjective:       Patient ID: Glendy Engle is a 48 y.o. female.    Chief Complaint: Follow-up    Follow-up  This is a chronic problem. The current episode started more than 1 month ago. The problem has been unchanged. Pertinent negatives include no abdominal pain, arthralgias, chest pain, chills, congestion, headaches or rash.   Past Medical History:   Diagnosis Date    Abnormal pulmonary function test 12/2019    Abnormal radiograph     Asthma     GERD (gastroesophageal reflux disease)     Hypertension     SHERLYN on CPAP     Thyroid nodule      Past Surgical History:   Procedure Laterality Date    CHOLECYSTECTOMY      COLONOSCOPY      ESOPHAGOGASTRODUODENOSCOPY      HYSTERECTOMY      LEG SURGERY      vein surgery on right leg    REDUCTION OF BOTH BREASTS      VASCULAR SURGERY      Rt GSV Laser Ablation Phlebectomy: Dr. Sergey Esteves  2017     Family History   Problem Relation Age of Onset    Heart failure Mother     Heart disease Mother     Hypertension Mother     Cancer Father     Cancer Maternal Aunt     Diabetes Maternal Grandmother     Diabetes Paternal Grandmother     Heart disease Paternal Grandfather      Review of patient's allergies indicates:   Allergen Reactions    Bactrim [sulfamethoxazole-trimethoprim] Hives      Social History     Tobacco Use    Smoking status: Never    Smokeless tobacco: Never   Substance Use Topics    Alcohol use: Not Currently    Drug use: Never      Review of Systems   Constitutional:  Negative for chills, activity change and night sweats.   HENT:  Negative for congestion and ear pain.    Eyes:  Negative for redness and itching.   Cardiovascular:  Negative for chest pain and palpitations.   Musculoskeletal:  Negative for arthralgias and back pain.   Skin:  Negative for rash.   Gastrointestinal:  Negative for abdominal pain and abdominal distention.   Neurological:  Negative for dizziness and headaches.   Hematological:  Negative for adenopathy. Does not bruise/bleed easily.    Psychiatric/Behavioral:  Negative for confusion. The patient is not nervous/anxious.      Objective:      Physical Exam   Constitutional: She is oriented to person, place, and time. She appears well-developed and well-nourished.   HENT:   Head: Normocephalic.   Nose: Nose normal.   Mouth/Throat: Oropharynx is clear and moist.   Neck: No JVD present. No thyromegaly present.   Cardiovascular: Normal rate, regular rhythm, normal heart sounds and intact distal pulses.   Pulmonary/Chest: Normal expansion, hyperinflation, symmetric chest wall expansion, effort normal and breath sounds normal.   Abdominal: Soft. Bowel sounds are normal.   Musculoskeletal:         General: Normal range of motion.      Cervical back: Normal range of motion and neck supple.   Lymphadenopathy: No supraclavicular adenopathy is present.     She has no cervical adenopathy.   Neurological: She is alert and oriented to person, place, and time. She has normal reflexes.   Skin: Skin is warm and dry.   Psychiatric: She has a normal mood and affect. Her behavior is normal.   Personal Diagnostic Review  none pertinent    No flowsheet data found.      Assessment:       1. Mild persistent asthma without complication        Outpatient Encounter Medications as of 4/6/2023   Medication Sig Dispense Refill    busPIRone (BUSPAR) 5 MG Tab Take 1 tablet (5 mg total) by mouth 3 (three) times daily as needed (ANXIETY). 90 tablet 11    cyclobenzaprine (FLEXERIL) 10 MG tablet Take 10 mg by mouth 3 (three) times daily as needed for Muscle spasms.      dexlansoprazole (DEXILANT) 60 mg capsule Take by mouth.      fexofenadine (ALLEGRA) 180 MG tablet Take 1 tablet (180 mg total) by mouth once daily. 90 tablet 3    furosemide (LASIX) 20 MG tablet richar 1-2 tabs daily as needed for swelling.  Take potassium with Lasix/tm 60 tablet 11    gabapentin (NEURONTIN) 300 MG capsule Take 1 capsule (300 mg total) by mouth 3 (three) times daily. 90 capsule 1     HYDROcodone-acetaminophen (NORCO)  mg per tablet Take 1 tablet by mouth 2 (two) times daily.      lisinopriL (PRINIVIL,ZESTRIL) 20 MG tablet Take 1 tablet (20 mg total) by mouth once daily. 90 tablet 3    metFORMIN (GLUCOPHAGE) 500 MG tablet TAKE 1 TABLET BY MOUTH TWICE DAILY WITH MEALS 180 tablet 1    simvastatin (ZOCOR) 40 MG tablet Take 40 mg by mouth every evening.      tiZANidine (ZANAFLEX) 4 MG tablet tizanidine 4 mg tablet   TAKE 1 TABLET BY MOUTH EVERY 8 HOURS FOR 10 DAYS      [DISCONTINUED] albuterol (PROVENTIL/VENTOLIN HFA) 90 mcg/actuation inhaler Inhale 2 puffs into the lungs every 6 (six) hours as needed for Wheezing. Rescue 18 g 5    [DISCONTINUED] budesonide-formoterol 160-4.5 mcg (SYMBICORT) 160-4.5 mcg/actuation HFAA Inhale 2 puffs into the lungs every 12 (twelve) hours. Controller      albuterol (PROVENTIL/VENTOLIN HFA) 90 mcg/actuation inhaler Inhale 2 puffs into the lungs every 6 (six) hours as needed for Wheezing. Rescue 18 g 5    budesonide-formoterol 160-4.5 mcg (SYMBICORT) 160-4.5 mcg/actuation HFAA Inhale 2 puffs into the lungs every 12 (twelve) hours. Controller 10.2 g 5    lansoprazole (PREVACID) 30 MG capsule Take 1 capsule (30 mg total) by mouth once daily. (Patient taking differently: Take 30 mg by mouth 2 (two) times a day.) 30 capsule 11    [] tiZANidine (ZANAFLEX) 4 MG tablet Take 1 tablet (4 mg total) by mouth every 8 (eight) hours. for 10 days 60 tablet 1     Facility-Administered Encounter Medications as of 2023   Medication Dose Route Frequency Provider Last Rate Last Admin    sodium chloride 0.9% flush 10 mL  10 mL Intravenous PRN SUNI Cruz        [DISCONTINUED] ketorolac (TORADOL) 30 mg/mL (1 mL) injection              No orders of the defined types were placed in this encounter.      Plan:       Problem List Items Addressed This Visit          Pulmonary    Mild persistent asthma without complication (Chronic)     Symptoms are under good control no  changes continue Symbicort and Ventolin see her back on a p.r.n. basis if her symptoms changes she seems to be doing well he is increase activity lose weight           Relevant Medications    albuterol (PROVENTIL/VENTOLIN HFA) 90 mcg/actuation inhaler

## 2023-06-22 DIAGNOSIS — E11.9 DIABETES MELLITUS WITHOUT COMPLICATION: ICD-10-CM

## 2023-06-22 RX ORDER — METFORMIN HYDROCHLORIDE 500 MG/1
TABLET ORAL
Qty: 180 TABLET | Refills: 0 | OUTPATIENT
Start: 2023-06-22

## 2023-07-12 ENCOUNTER — OFFICE VISIT (OUTPATIENT)
Dept: FAMILY MEDICINE | Facility: CLINIC | Age: 48
End: 2023-07-12
Payer: COMMERCIAL

## 2023-07-12 VITALS
BODY MASS INDEX: 43.83 KG/M2 | TEMPERATURE: 98 F | HEIGHT: 63 IN | WEIGHT: 247.38 LBS | OXYGEN SATURATION: 95 % | RESPIRATION RATE: 18 BRPM | HEART RATE: 83 BPM | SYSTOLIC BLOOD PRESSURE: 122 MMHG | DIASTOLIC BLOOD PRESSURE: 82 MMHG

## 2023-07-12 DIAGNOSIS — I10 PRIMARY HYPERTENSION: ICD-10-CM

## 2023-07-12 DIAGNOSIS — E11.9 DIABETES MELLITUS WITHOUT COMPLICATION: Chronic | ICD-10-CM

## 2023-07-12 DIAGNOSIS — F41.1 GENERALIZED ANXIETY DISORDER: Primary | Chronic | ICD-10-CM

## 2023-07-12 PROBLEM — J11.1 INFLUENZA-LIKE ILLNESS: Status: RESOLVED | Noted: 2022-12-01 | Resolved: 2023-07-12

## 2023-07-12 PROBLEM — Z23 NEED FOR PNEUMOCOCCAL VACCINATION: Status: RESOLVED | Noted: 2022-11-03 | Resolved: 2023-07-12

## 2023-07-12 PROBLEM — R05.9 COUGHING: Status: RESOLVED | Noted: 2022-07-11 | Resolved: 2023-07-12

## 2023-07-12 LAB
ALBUMIN SERPL BCP-MCNC: 3.5 G/DL (ref 3.5–5)
ALBUMIN/GLOB SERPL: 1 {RATIO}
ALP SERPL-CCNC: 126 U/L (ref 39–100)
ALT SERPL W P-5'-P-CCNC: 39 U/L (ref 13–56)
ANION GAP SERPL CALCULATED.3IONS-SCNC: 12 MMOL/L (ref 7–16)
AST SERPL W P-5'-P-CCNC: 48 U/L (ref 15–37)
BASOPHILS # BLD AUTO: 0.07 K/UL (ref 0–0.2)
BASOPHILS NFR BLD AUTO: 0.8 % (ref 0–1)
BILIRUB SERPL-MCNC: 0.5 MG/DL (ref ?–1.2)
BUN SERPL-MCNC: 9 MG/DL (ref 7–18)
BUN/CREAT SERPL: 14 (ref 6–20)
CALCIUM SERPL-MCNC: 8.7 MG/DL (ref 8.5–10.1)
CHLORIDE SERPL-SCNC: 105 MMOL/L (ref 98–107)
CHOLEST SERPL-MCNC: 150 MG/DL (ref 0–200)
CHOLEST/HDLC SERPL: 2.7 {RATIO}
CO2 SERPL-SCNC: 31 MMOL/L (ref 21–32)
CREAT SERPL-MCNC: 0.64 MG/DL (ref 0.55–1.02)
DIFFERENTIAL METHOD BLD: ABNORMAL
EGFR (NO RACE VARIABLE) (RUSH/TITUS): 111 ML/MIN/1.73M2
EOSINOPHIL # BLD AUTO: 0.23 K/UL (ref 0–0.5)
EOSINOPHIL NFR BLD AUTO: 2.5 % (ref 1–4)
ERYTHROCYTE [DISTWIDTH] IN BLOOD BY AUTOMATED COUNT: 12.4 % (ref 11.5–14.5)
EST. AVERAGE GLUCOSE BLD GHB EST-MCNC: 87 MG/DL
GLOBULIN SER-MCNC: 3.5 G/DL (ref 2–4)
GLUCOSE SERPL-MCNC: 70 MG/DL (ref 74–106)
HBA1C MFR BLD HPLC: 5.2 % (ref 4.5–6.6)
HCT VFR BLD AUTO: 45.7 % (ref 38–47)
HDLC SERPL-MCNC: 55 MG/DL (ref 40–60)
HGB BLD-MCNC: 15.4 G/DL (ref 12–16)
IMM GRANULOCYTES # BLD AUTO: 0.03 K/UL (ref 0–0.04)
IMM GRANULOCYTES NFR BLD: 0.3 % (ref 0–0.4)
LDLC SERPL CALC-MCNC: 56 MG/DL
LDLC/HDLC SERPL: 1 {RATIO}
LYMPHOCYTES # BLD AUTO: 2.58 K/UL (ref 1–4.8)
LYMPHOCYTES NFR BLD AUTO: 28.3 % (ref 27–41)
MCH RBC QN AUTO: 31.3 PG (ref 27–31)
MCHC RBC AUTO-ENTMCNC: 33.7 G/DL (ref 32–36)
MCV RBC AUTO: 92.9 FL (ref 80–96)
MONOCYTES # BLD AUTO: 1.01 K/UL (ref 0–0.8)
MONOCYTES NFR BLD AUTO: 11.1 % (ref 2–6)
MPC BLD CALC-MCNC: 11.6 FL (ref 9.4–12.4)
NEUTROPHILS # BLD AUTO: 5.2 K/UL (ref 1.8–7.7)
NEUTROPHILS NFR BLD AUTO: 57 % (ref 53–65)
NONHDLC SERPL-MCNC: 95 MG/DL
NRBC # BLD AUTO: 0 X10E3/UL
NRBC, AUTO (.00): 0 %
PLATELET # BLD AUTO: 168 K/UL (ref 150–400)
POTASSIUM SERPL-SCNC: 3.6 MMOL/L (ref 3.5–5.1)
PROT SERPL-MCNC: 7 G/DL (ref 6.4–8.2)
RBC # BLD AUTO: 4.92 M/UL (ref 4.2–5.4)
SODIUM SERPL-SCNC: 144 MMOL/L (ref 136–145)
TRIGL SERPL-MCNC: 196 MG/DL (ref 35–150)
TSH SERPL DL<=0.005 MIU/L-ACNC: 1.69 UIU/ML (ref 0.36–3.74)
VLDLC SERPL-MCNC: 39 MG/DL
WBC # BLD AUTO: 9.12 K/UL (ref 4.5–11)

## 2023-07-12 PROCEDURE — 1160F RVW MEDS BY RX/DR IN RCRD: CPT | Mod: CPTII,,, | Performed by: NURSE PRACTITIONER

## 2023-07-12 PROCEDURE — 99214 PR OFFICE/OUTPT VISIT, EST, LEVL IV, 30-39 MIN: ICD-10-PCS | Mod: ,,, | Performed by: NURSE PRACTITIONER

## 2023-07-12 PROCEDURE — 99214 OFFICE O/P EST MOD 30 MIN: CPT | Mod: ,,, | Performed by: NURSE PRACTITIONER

## 2023-07-12 PROCEDURE — 80050 GENERAL HEALTH PANEL: CPT | Mod: ,,, | Performed by: CLINICAL MEDICAL LABORATORY

## 2023-07-12 PROCEDURE — 3074F SYST BP LT 130 MM HG: CPT | Mod: CPTII,,, | Performed by: NURSE PRACTITIONER

## 2023-07-12 PROCEDURE — 1159F MED LIST DOCD IN RCRD: CPT | Mod: CPTII,,, | Performed by: NURSE PRACTITIONER

## 2023-07-12 PROCEDURE — 83036 HEMOGLOBIN GLYCOSYLATED A1C: CPT | Mod: ,,, | Performed by: CLINICAL MEDICAL LABORATORY

## 2023-07-12 PROCEDURE — 80061 LIPID PANEL: ICD-10-PCS | Mod: ,,, | Performed by: CLINICAL MEDICAL LABORATORY

## 2023-07-12 PROCEDURE — 80050 PR  GENERAL HEALTH PANEL: ICD-10-PCS | Mod: ,,, | Performed by: CLINICAL MEDICAL LABORATORY

## 2023-07-12 PROCEDURE — 1160F PR REVIEW ALL MEDS BY PRESCRIBER/CLIN PHARMACIST DOCUMENTED: ICD-10-PCS | Mod: CPTII,,, | Performed by: NURSE PRACTITIONER

## 2023-07-12 PROCEDURE — 80061 LIPID PANEL: CPT | Mod: ,,, | Performed by: CLINICAL MEDICAL LABORATORY

## 2023-07-12 PROCEDURE — 3079F DIAST BP 80-89 MM HG: CPT | Mod: CPTII,,, | Performed by: NURSE PRACTITIONER

## 2023-07-12 PROCEDURE — 3008F BODY MASS INDEX DOCD: CPT | Mod: CPTII,,, | Performed by: NURSE PRACTITIONER

## 2023-07-12 PROCEDURE — 1159F PR MEDICATION LIST DOCUMENTED IN MEDICAL RECORD: ICD-10-PCS | Mod: CPTII,,, | Performed by: NURSE PRACTITIONER

## 2023-07-12 PROCEDURE — 3079F PR MOST RECENT DIASTOLIC BLOOD PRESSURE 80-89 MM HG: ICD-10-PCS | Mod: CPTII,,, | Performed by: NURSE PRACTITIONER

## 2023-07-12 PROCEDURE — 3074F PR MOST RECENT SYSTOLIC BLOOD PRESSURE < 130 MM HG: ICD-10-PCS | Mod: CPTII,,, | Performed by: NURSE PRACTITIONER

## 2023-07-12 PROCEDURE — 3008F PR BODY MASS INDEX (BMI) DOCUMENTED: ICD-10-PCS | Mod: CPTII,,, | Performed by: NURSE PRACTITIONER

## 2023-07-12 PROCEDURE — 83036 HEMOGLOBIN A1C: ICD-10-PCS | Mod: ,,, | Performed by: CLINICAL MEDICAL LABORATORY

## 2023-07-12 RX ORDER — LISINOPRIL 20 MG/1
20 TABLET ORAL DAILY
Qty: 90 TABLET | Refills: 3 | Status: SHIPPED | OUTPATIENT
Start: 2023-07-12 | End: 2024-07-11

## 2023-07-12 RX ORDER — METFORMIN HYDROCHLORIDE 500 MG/1
500 TABLET ORAL 2 TIMES DAILY WITH MEALS
Qty: 180 TABLET | Refills: 1 | Status: SHIPPED | OUTPATIENT
Start: 2023-07-12 | End: 2023-10-30 | Stop reason: SDUPTHER

## 2023-07-12 RX ORDER — PAROXETINE HYDROCHLORIDE 20 MG/1
20 TABLET, FILM COATED ORAL NIGHTLY
COMMUNITY
Start: 2023-04-27 | End: 2023-07-12 | Stop reason: SDUPTHER

## 2023-07-12 RX ORDER — PAROXETINE HYDROCHLORIDE 20 MG/1
20 TABLET, FILM COATED ORAL NIGHTLY
Qty: 90 TABLET | Refills: 3 | Status: SHIPPED | OUTPATIENT
Start: 2023-07-12

## 2023-07-12 NOTE — PROGRESS NOTES
SUNI Cruz   [facility]  [address]   [place] [address]     PATIENT NAME: Glendy Engle  : 1975  DATE: 23  MRN: [id number]      Billing Provider: SUNI Cruz  Level of Service:   Patient PCP Information       Provider PCP Type    SUNI Cruz General            Reason for Visit / Chief Complaint: Medication Refill and change medication (Wants to get back on Paxil and has already stopped the Buspar; the Buspar made her jittery)         History of Present Illness / Problem Focused Workflow     Glendy Engle is a 48 y.o. female presents to the clinic  for med refill and  med changes.  She has been on buspar which makes him  jittery and wants to get back on paxil.  She is doing well otherwise.  No suicidal or homocidal ideas.  She is sleeping fair and is using her SHERLYN but not consistently.   Blood pressure is elevated today she thinks due to back pain that has been worse recently.   She is taking her meds regularly.   She is not having any swelling       Review of Systems     Review of Systems   Constitutional:  Negative for fatigue.   HENT:  Negative for nasal congestion and sore throat.    Respiratory:  Negative for cough, chest tightness and shortness of breath.    Cardiovascular:  Negative for chest pain, palpitations and leg swelling.   Gastrointestinal:  Negative for nausea, vomiting and reflux.   Neurological:  Negative for weakness and memory loss.   Psychiatric/Behavioral:  Negative for confusion and sleep disturbance.         Anxiety        Medical / Social / Family History     Past Medical History:   Diagnosis Date    Abnormal pulmonary function test 2019    Abnormal radiograph     Asthma     GERD (gastroesophageal reflux disease)     Hypertension     SHERLYN on CPAP     Thyroid nodule        Past Surgical History:   Procedure Laterality Date    CHOLECYSTECTOMY      COLONOSCOPY      ESOPHAGOGASTRODUODENOSCOPY      HYSTERECTOMY      LEG SURGERY      vein surgery  on right leg    REDUCTION OF BOTH BREASTS      VASCULAR SURGERY      Rt GSV Laser Ablation Phlebectomy: Dr. Sergey Esteves  2017       Social History  Ms.  reports that she has never smoked. She has never used smokeless tobacco. She reports that she does not currently use alcohol. She reports that she does not use drugs.    Family History  Ms.'s family history includes Cancer in her father and maternal aunt; Diabetes in her maternal grandmother and paternal grandmother; Heart disease in her mother and paternal grandfather; Heart failure in her mother; Hypertension in her mother.    Medications and Allergies     Medications  Outpatient Medications Marked as Taking for the 7/12/23 encounter (Office Visit) with SUNI Cruz   Medication Sig Dispense Refill    albuterol (PROVENTIL/VENTOLIN HFA) 90 mcg/actuation inhaler Inhale 2 puffs into the lungs every 6 (six) hours as needed for Wheezing. Rescue 18 g 5    budesonide-formoterol 160-4.5 mcg (SYMBICORT) 160-4.5 mcg/actuation HFAA Inhale 2 puffs into the lungs every 12 (twelve) hours. Controller 10.2 g 5    cyclobenzaprine (FLEXERIL) 10 MG tablet Take 10 mg by mouth 3 (three) times daily as needed for Muscle spasms.      dexlansoprazole (DEXILANT) 60 mg capsule Take by mouth.      fexofenadine (ALLEGRA) 180 MG tablet Take 1 tablet (180 mg total) by mouth once daily. 90 tablet 3    furosemide (LASIX) 20 MG tablet richar 1-2 tabs daily as needed for swelling.  Take potassium with Lasix/tm 60 tablet 11    gabapentin (NEURONTIN) 300 MG capsule Take 1 capsule (300 mg total) by mouth 3 (three) times daily. 90 capsule 1    HYDROcodone-acetaminophen (NORCO)  mg per tablet Take 1 tablet by mouth 2 (two) times daily.      lisinopriL (PRINIVIL,ZESTRIL) 20 MG tablet Take 1 tablet (20 mg total) by mouth once daily. 90 tablet 3    metFORMIN (GLUCOPHAGE) 500 MG tablet TAKE 1 TABLET BY MOUTH TWICE DAILY WITH MEALS 180 tablet 1    paroxetine (PAXIL) 20 MG tablet Take 20 mg by mouth every  "evening.      simvastatin (ZOCOR) 40 MG tablet Take 40 mg by mouth every evening.      tiZANidine (ZANAFLEX) 4 MG tablet tizanidine 4 mg tablet   TAKE 1 TABLET BY MOUTH EVERY 8 HOURS FOR 10 DAYS       Current Facility-Administered Medications for the 7/12/23 encounter (Office Visit) with SUNI Cruz   Medication Dose Route Frequency Provider Last Rate Last Admin    sodium chloride 0.9% flush 10 mL  10 mL Intravenous PRN SUNI Cruz           Allergies  Review of patient's allergies indicates:   Allergen Reactions    Bactrim [sulfamethoxazole-trimethoprim] Hives       Physical Examination     Vitals:    07/12/23 1411 07/12/23 1412   BP: (!) 159/90 (!) 150/88   BP Location: Right arm Right arm   Patient Position: Sitting Sitting   BP Method: X-Large (Automatic) Large (Manual)   Pulse: 83    Resp: 18    Temp: 98 °F (36.7 °C)    TempSrc: Oral    SpO2: 95%    Weight: 112.2 kg (247 lb 6.4 oz)    Height: 5' 3" (1.6 m)       Physical Exam  Constitutional:       Appearance: She is obese.   Cardiovascular:      Rate and Rhythm: Normal rate and regular rhythm.      Pulses: Normal pulses.      Heart sounds: Normal heart sounds.   Pulmonary:      Effort: Pulmonary effort is normal.      Breath sounds: Normal breath sounds.   Musculoskeletal:      Right lower leg: Edema (trace) present.      Left lower leg: Edema (trace) present.   Neurological:      Mental Status: She is alert and oriented to person, place, and time.        Assessment and Plan (including Health Maintenance)     :    Plan:  will refill paxil 20mg once daily and stop buspar. Encouraged to continue with diet and exercise.  Drink plenty of fluids.         Health Maintenance Due   Topic Date Due    HIV Screening  Never done       Problem List Items Addressed This Visit    None  Visit Diagnoses       Diabetes mellitus without complication            .  Diabetes mellitus without complication       Health Maintenance Topics with due status: Not Due       Topic " Last Completion Date    TETANUS VACCINE 05/30/2017    Hemoglobin A1c (Prediabetes) 09/23/2022    Influenza Vaccine 11/03/2022    Colorectal Cancer Screening 11/12/2022    Lipid Panel 11/15/2022    Mammogram 11/28/2022       Procedures     Future Appointments   Date Time Provider Department Center   11/15/2023  9[facility]   12/12/2023  9:30 AM [facility]   1/10/2024  2:45 PM Elmer Cross DO [facility] SLEEP Michel Romo        No follow-ups on file.     Signature:  SUNI Cruz    Date of encounter: 7/12/23

## 2023-07-13 NOTE — PROGRESS NOTES
Please notify Glendy zhang her liver tests have improved, kidney function is good,  cholesterol is good--stay on meds.  Blood count is normal.  A1c is excellent at 5.2.  TSH is normal/tm

## 2023-08-14 RX ORDER — SIMVASTATIN 40 MG/1
40 TABLET, FILM COATED ORAL NIGHTLY
Qty: 90 TABLET | Refills: 3 | Status: SHIPPED | OUTPATIENT
Start: 2023-08-14

## 2023-08-14 NOTE — TELEPHONE ENCOUNTER
----- Message from Ronna Maddox sent at 8/11/2023  7:51 AM CDT -----  Needs refills on her cholesterol medication sent to [facility] pharmacy in [place]

## 2023-08-16 ENCOUNTER — OFFICE VISIT (OUTPATIENT)
Dept: FAMILY MEDICINE | Facility: CLINIC | Age: 48
End: 2023-08-16
Payer: COMMERCIAL

## 2023-08-16 VITALS
SYSTOLIC BLOOD PRESSURE: 133 MMHG | BODY MASS INDEX: 44.44 KG/M2 | DIASTOLIC BLOOD PRESSURE: 83 MMHG | RESPIRATION RATE: 20 BRPM | HEART RATE: 70 BPM | WEIGHT: 250.81 LBS | OXYGEN SATURATION: 95 % | TEMPERATURE: 97 F | HEIGHT: 63 IN

## 2023-08-16 DIAGNOSIS — I73.9 PERIPHERAL VASCULAR DISEASE: Chronic | ICD-10-CM

## 2023-08-16 DIAGNOSIS — G89.29 CHRONIC HEEL PAIN, LEFT: Primary | ICD-10-CM

## 2023-08-16 DIAGNOSIS — M79.672 CHRONIC HEEL PAIN, LEFT: Primary | ICD-10-CM

## 2023-08-16 DIAGNOSIS — J45.30 MILD PERSISTENT ASTHMA WITHOUT COMPLICATION: Chronic | ICD-10-CM

## 2023-08-16 PROCEDURE — 3044F HG A1C LEVEL LT 7.0%: CPT | Mod: CPTII,,, | Performed by: NURSE PRACTITIONER

## 2023-08-16 PROCEDURE — 4010F ACE/ARB THERAPY RXD/TAKEN: CPT | Mod: CPTII,,, | Performed by: NURSE PRACTITIONER

## 2023-08-16 PROCEDURE — 4010F PR ACE/ARB THEARPY RXD/TAKEN: ICD-10-PCS | Mod: CPTII,,, | Performed by: NURSE PRACTITIONER

## 2023-08-16 PROCEDURE — 3008F PR BODY MASS INDEX (BMI) DOCUMENTED: ICD-10-PCS | Mod: CPTII,,, | Performed by: NURSE PRACTITIONER

## 2023-08-16 PROCEDURE — 1159F PR MEDICATION LIST DOCUMENTED IN MEDICAL RECORD: ICD-10-PCS | Mod: CPTII,,, | Performed by: NURSE PRACTITIONER

## 2023-08-16 PROCEDURE — 3044F PR MOST RECENT HEMOGLOBIN A1C LEVEL <7.0%: ICD-10-PCS | Mod: CPTII,,, | Performed by: NURSE PRACTITIONER

## 2023-08-16 PROCEDURE — 99214 PR OFFICE/OUTPT VISIT, EST, LEVL IV, 30-39 MIN: ICD-10-PCS | Mod: ,,, | Performed by: NURSE PRACTITIONER

## 2023-08-16 PROCEDURE — 3008F BODY MASS INDEX DOCD: CPT | Mod: CPTII,,, | Performed by: NURSE PRACTITIONER

## 2023-08-16 PROCEDURE — 99214 OFFICE O/P EST MOD 30 MIN: CPT | Mod: ,,, | Performed by: NURSE PRACTITIONER

## 2023-08-16 PROCEDURE — 3079F PR MOST RECENT DIASTOLIC BLOOD PRESSURE 80-89 MM HG: ICD-10-PCS | Mod: CPTII,,, | Performed by: NURSE PRACTITIONER

## 2023-08-16 PROCEDURE — 3075F PR MOST RECENT SYSTOLIC BLOOD PRESS GE 130-139MM HG: ICD-10-PCS | Mod: CPTII,,, | Performed by: NURSE PRACTITIONER

## 2023-08-16 PROCEDURE — 1159F MED LIST DOCD IN RCRD: CPT | Mod: CPTII,,, | Performed by: NURSE PRACTITIONER

## 2023-08-16 PROCEDURE — 3079F DIAST BP 80-89 MM HG: CPT | Mod: CPTII,,, | Performed by: NURSE PRACTITIONER

## 2023-08-16 PROCEDURE — 3075F SYST BP GE 130 - 139MM HG: CPT | Mod: CPTII,,, | Performed by: NURSE PRACTITIONER

## 2023-08-16 RX ORDER — ALBUTEROL SULFATE 90 UG/1
2 AEROSOL, METERED RESPIRATORY (INHALATION) EVERY 6 HOURS PRN
Qty: 18 G | Refills: 5 | Status: SHIPPED | OUTPATIENT
Start: 2023-08-16 | End: 2024-01-09 | Stop reason: SDUPTHER

## 2023-08-16 NOTE — PROGRESS NOTES
SUNI Cruz   [facility]  [address]   [place] [address]     PATIENT NAME: Glendy Engle  : 1975  DATE: 23  MRN: [id number]      Billing Provider: SUNI Cruz  Level of Service:   Patient PCP Information       Provider PCP Type    SUNI Cruz General            Reason for Visit / Chief Complaint: Leg Swelling (Leg and feet edema x several weeks and the fluid medication has not helped. Pt also reports having left heel pain)         History of Present Illness / Problem Focused Workflow     Glendy Engle is a 48 y.o. female presents to the clinic  with  complaing of swelling in lower legs and feet x several weeks. She is not wearing her compression socks but is taking her lasix.  She states her swelling is not going down at night .  She developed left heel and ankle pain several x several weeks.   The pain is there only with  pressure.  She has had similar problems in the past but no xray.      Review of Systems     Review of Systems   Constitutional:  Negative for fatigue.   HENT:  Negative for nasal congestion and sore throat.    Respiratory:  Negative for cough, chest tightness and shortness of breath.    Cardiovascular:  Negative for chest pain, palpitations and leg swelling.   Gastrointestinal:  Negative for nausea, vomiting and reflux.   Musculoskeletal:  Positive for arthralgias (left heel pain).   Integumentary:  Positive for rash (arms, face, scalp from dog scratching).   Neurological:  Negative for weakness and memory loss.   Psychiatric/Behavioral:  Negative for confusion and sleep disturbance.       Medical / Social / Family History     Past Medical History:   Diagnosis Date    Abnormal pulmonary function test 2019    Abnormal radiograph     Asthma     GERD (gastroesophageal reflux disease)     Hypertension     SHERLYN on CPAP     Thyroid nodule        Past Surgical History:   Procedure Laterality Date    CHOLECYSTECTOMY      COLONOSCOPY       ESOPHAGOGASTRODUODENOSCOPY      HYSTERECTOMY      LEG SURGERY      vein surgery on right leg    REDUCTION OF BOTH BREASTS      VASCULAR SURGERY      Rt GSV Laser Ablation Phlebectomy: Dr. Sergey Esteves  2017       Social History  Ms.  reports that she has never smoked. She has never used smokeless tobacco. She reports that she does not currently use alcohol. She reports that she does not use drugs.    Family History  Ms.'s family history includes Cancer in her father and maternal aunt; Diabetes in her maternal grandmother and paternal grandmother; Heart disease in her mother and paternal grandfather; Heart failure in her mother; Hypertension in her mother.    Medications and Allergies     Medications  Outpatient Medications Marked as Taking for the 8/16/23 encounter (Office Visit) with Kelli Paez FNP   Medication Sig Dispense Refill    albuterol (PROVENTIL/VENTOLIN HFA) 90 mcg/actuation inhaler Inhale 2 puffs into the lungs every 6 (six) hours as needed for Wheezing. Rescue 18 g 5    budesonide-formoterol 160-4.5 mcg (SYMBICORT) 160-4.5 mcg/actuation HFAA Inhale 2 puffs into the lungs every 12 (twelve) hours. Controller 10.2 g 5    cyclobenzaprine (FLEXERIL) 10 MG tablet Take 10 mg by mouth 3 (three) times daily as needed for Muscle spasms.      dexlansoprazole (DEXILANT) 60 mg capsule Take by mouth.      fexofenadine (ALLEGRA) 180 MG tablet Take 1 tablet (180 mg total) by mouth once daily. 90 tablet 3    furosemide (LASIX) 20 MG tablet richar 1-2 tabs daily as needed for swelling.  Take potassium with Lasix/tm 60 tablet 11    HYDROcodone-acetaminophen (NORCO)  mg per tablet Take 1 tablet by mouth 2 (two) times daily.      lisinopriL (PRINIVIL,ZESTRIL) 20 MG tablet Take 1 tablet (20 mg total) by mouth once daily. 90 tablet 3    metFORMIN (GLUCOPHAGE) 500 MG tablet Take 1 tablet (500 mg total) by mouth 2 (two) times daily with meals. 180 tablet 1    paroxetine (PAXIL) 20 MG tablet Take 1 tablet (20 mg total) by mouth  "every evening. 90 tablet 3    simvastatin (ZOCOR) 40 MG tablet Take 1 tablet (40 mg total) by mouth every evening. 90 tablet 3     Current Facility-Administered Medications for the 8/16/23 encounter (Office Visit) with Kelli Paez FNP   Medication Dose Route Frequency Provider Last Rate Last Admin    [DISCONTINUED] sodium chloride 0.9% flush 10 mL  10 mL Intravenous PRN Kelli Paez FNP           Allergies  Review of patient's allergies indicates:   Allergen Reactions    Bactrim [sulfamethoxazole-trimethoprim] Hives       Physical Examination     Vitals:    08/16/23 1353   BP: 133/83   BP Location: Right arm   Patient Position: Sitting   BP Method: X-Large (Automatic)   Pulse: 70   Resp: 20   Temp: 97 °F (36.1 °C)   TempSrc: Oral   SpO2: 95%   Weight: 113.8 kg (250 lb 12.8 oz)   Height: 5' 3" (1.6 m)      Physical Exam  Constitutional:       Appearance: She is obese.   Cardiovascular:      Rate and Rhythm: Normal rate and regular rhythm.      Pulses: Normal pulses.      Heart sounds: Normal heart sounds.   Pulmonary:      Effort: Pulmonary effort is normal.      Breath sounds: Normal breath sounds.   Musculoskeletal:      Right lower leg: Edema present.      Left lower leg: Edema present.      Comments: Tender to palp left heel plantar surface.   Skin:     General: Skin is warm and dry.   Neurological:      Mental Status: She is alert.        Assessment and Plan (including Health Maintenance)     :    Plan: foot xray done with heel spur noted plantar surface and posterior heel surface.  Advised to stretch her feet frequently, especially before getting up and walking, use  frozen water bottle to massage foot, avoid nsaid if possible due to chronic GERD.   Follow up if worsening.  Advised needs to wear her compression socks to help with edema, avoid salty foods/drinks and continue with lasix.  Follow up prn        Health Maintenance Due   Topic Date Due    HIV Screening  Never done       Problem List Items Addressed " This Visit          Pulmonary    Mild persistent asthma without complication (Chronic)   .  Mild persistent asthma without complication       Health Maintenance Topics with due status: Not Due       Topic Last Completion Date    TETANUS VACCINE 05/30/2017    Influenza Vaccine 11/03/2022    Colorectal Cancer Screening 11/12/2022    Mammogram 11/28/2022    Hemoglobin A1c (Prediabetes) 07/12/2023    Lipid Panel 07/12/2023       Procedures     Future Appointments   Date Time Provider Department Center   11/15/2023  9:00 AM Kelli Paez FNP [facility]   12[facility]12/2023  [facility]    1/10/2024  2:45 PM Elmer Cross,  [facility] SLEEP Michel Romo        No follow-ups on file.     Signature:  SUNI Cruz    Date of encounter: 8/16/23

## 2023-08-16 NOTE — PATIENT INSTRUCTIONS
Advised to stretch her feet frequently, especially before getting up and walking, use  frozen water bottle to massage foot, avoid nsaid if possible due to chronic GERD.   Follow up if worsening.  Advised needs to wear her compression socks to help with edema, avoid salty foods/drinks and continue with lasix.  Follow up prn

## 2023-10-30 DIAGNOSIS — E11.9 DIABETES MELLITUS WITHOUT COMPLICATION: Chronic | ICD-10-CM

## 2023-10-30 RX ORDER — METFORMIN HYDROCHLORIDE 500 MG/1
500 TABLET ORAL 2 TIMES DAILY WITH MEALS
Qty: 180 TABLET | Refills: 1 | Status: SHIPPED | OUTPATIENT
Start: 2023-10-30 | End: 2023-11-15 | Stop reason: SDUPTHER

## 2023-10-30 NOTE — TELEPHONE ENCOUNTER
----- Message from Ronna Maddox sent at 10/30/2023  8:00 AM CDT -----  Needs refills on metformin called in to [facility] pharmacy in [facility]

## 2023-11-15 ENCOUNTER — OFFICE VISIT (OUTPATIENT)
Dept: FAMILY MEDICINE | Facility: CLINIC | Age: 48
End: 2023-11-15
Payer: COMMERCIAL

## 2023-11-15 VITALS
TEMPERATURE: 98 F | WEIGHT: 253 LBS | SYSTOLIC BLOOD PRESSURE: 123 MMHG | HEIGHT: 63 IN | BODY MASS INDEX: 44.83 KG/M2 | DIASTOLIC BLOOD PRESSURE: 69 MMHG | RESPIRATION RATE: 18 BRPM | OXYGEN SATURATION: 96 % | HEART RATE: 64 BPM

## 2023-11-15 DIAGNOSIS — R30.0 DYSURIA: ICD-10-CM

## 2023-11-15 DIAGNOSIS — Z12.72 SCREENING FOR VAGINAL CANCER: ICD-10-CM

## 2023-11-15 DIAGNOSIS — E66.01 MORBID OBESITY: Chronic | ICD-10-CM

## 2023-11-15 DIAGNOSIS — F41.1 GENERALIZED ANXIETY DISORDER: Chronic | ICD-10-CM

## 2023-11-15 DIAGNOSIS — I10 PRIMARY HYPERTENSION: Chronic | ICD-10-CM

## 2023-11-15 DIAGNOSIS — K21.9 GASTROESOPHAGEAL REFLUX DISEASE WITHOUT ESOPHAGITIS: Chronic | ICD-10-CM

## 2023-11-15 DIAGNOSIS — E11.9 DIABETES MELLITUS WITHOUT COMPLICATION: Chronic | ICD-10-CM

## 2023-11-15 DIAGNOSIS — Z13.1 SCREENING FOR DIABETES MELLITUS: ICD-10-CM

## 2023-11-15 DIAGNOSIS — M54.16 LUMBAR RADICULOPATHY: Chronic | ICD-10-CM

## 2023-11-15 DIAGNOSIS — J45.30 MILD PERSISTENT ASTHMA WITHOUT COMPLICATION: Chronic | ICD-10-CM

## 2023-11-15 DIAGNOSIS — Z12.31 ENCOUNTER FOR SCREENING MAMMOGRAM FOR MALIGNANT NEOPLASM OF BREAST: ICD-10-CM

## 2023-11-15 DIAGNOSIS — Z13.220 SCREENING FOR LIPID DISORDERS: ICD-10-CM

## 2023-11-15 DIAGNOSIS — Z00.00 ROUTINE GENERAL MEDICAL EXAMINATION AT A HEALTH CARE FACILITY: ICD-10-CM

## 2023-11-15 DIAGNOSIS — G47.33 OSA ON CPAP: Chronic | ICD-10-CM

## 2023-11-15 DIAGNOSIS — Z23 NEED FOR VACCINATION: Primary | ICD-10-CM

## 2023-11-15 DIAGNOSIS — G62.9 PERIPHERAL POLYNEUROPATHY: Chronic | ICD-10-CM

## 2023-11-15 LAB
ALBUMIN SERPL BCP-MCNC: 3.6 G/DL (ref 3.5–5)
ALBUMIN/GLOB SERPL: 0.9 {RATIO}
ALP SERPL-CCNC: 131 U/L (ref 39–100)
ALT SERPL W P-5'-P-CCNC: 55 U/L (ref 13–56)
ANION GAP SERPL CALCULATED.3IONS-SCNC: 7 MMOL/L (ref 7–16)
AST SERPL W P-5'-P-CCNC: 63 U/L (ref 15–37)
BASOPHILS # BLD AUTO: 0.08 K/UL (ref 0–0.2)
BASOPHILS NFR BLD AUTO: 1.1 % (ref 0–1)
BILIRUB SERPL-MCNC: 0.4 MG/DL (ref ?–1.2)
BILIRUB UR QL STRIP: NEGATIVE
BUN SERPL-MCNC: 13 MG/DL (ref 7–18)
BUN/CREAT SERPL: 19 (ref 6–20)
CALCIUM SERPL-MCNC: 9.2 MG/DL (ref 8.5–10.1)
CHLORIDE SERPL-SCNC: 106 MMOL/L (ref 98–107)
CHOLEST SERPL-MCNC: 135 MG/DL (ref 0–200)
CHOLEST/HDLC SERPL: 2.3 {RATIO}
CLARITY UR: ABNORMAL
CO2 SERPL-SCNC: 33 MMOL/L (ref 21–32)
COLOR UR: YELLOW
CREAT SERPL-MCNC: 0.67 MG/DL (ref 0.55–1.02)
CREAT UR-MCNC: 264 MG/DL (ref 28–219)
DIFFERENTIAL METHOD BLD: ABNORMAL
EGFR (NO RACE VARIABLE) (RUSH/TITUS): 109 ML/MIN/1.73M2
EOSINOPHIL # BLD AUTO: 0.21 K/UL (ref 0–0.5)
EOSINOPHIL NFR BLD AUTO: 3 % (ref 1–4)
ERYTHROCYTE [DISTWIDTH] IN BLOOD BY AUTOMATED COUNT: 13.2 % (ref 11.5–14.5)
EST. AVERAGE GLUCOSE BLD GHB EST-MCNC: 108 MG/DL
GLOBULIN SER-MCNC: 4.1 G/DL (ref 2–4)
GLUCOSE SERPL-MCNC: 77 MG/DL (ref 74–106)
GLUCOSE UR STRIP-MCNC: NORMAL MG/DL
HBA1C MFR BLD HPLC: 5.4 % (ref 4.5–6.6)
HCT VFR BLD AUTO: 45.5 % (ref 38–47)
HDLC SERPL-MCNC: 59 MG/DL (ref 40–60)
HGB BLD-MCNC: 15.5 G/DL (ref 12–16)
IMM GRANULOCYTES # BLD AUTO: 0.02 K/UL (ref 0–0.04)
IMM GRANULOCYTES NFR BLD: 0.3 % (ref 0–0.4)
KETONES UR STRIP-SCNC: NEGATIVE MG/DL
LDLC SERPL CALC-MCNC: 49 MG/DL
LEUKOCYTE ESTERASE UR QL STRIP: NEGATIVE
LYMPHOCYTES # BLD AUTO: 2.19 K/UL (ref 1–4.8)
LYMPHOCYTES NFR BLD AUTO: 31.2 % (ref 27–41)
MCH RBC QN AUTO: 31.8 PG (ref 27–31)
MCHC RBC AUTO-ENTMCNC: 34.1 G/DL (ref 32–36)
MCV RBC AUTO: 93.4 FL (ref 80–96)
MICROALBUMIN UR-MCNC: 6.5 MG/DL (ref 0–2.8)
MICROALBUMIN/CREAT RATIO PNL UR: 24.6 MG/G (ref 0–30)
MONOCYTES # BLD AUTO: 0.74 K/UL (ref 0–0.8)
MONOCYTES NFR BLD AUTO: 10.5 % (ref 2–6)
MPC BLD CALC-MCNC: 12 FL (ref 9.4–12.4)
MUCOUS, UA: ABNORMAL /LPF
NEUTROPHILS # BLD AUTO: 3.78 K/UL (ref 1.8–7.7)
NEUTROPHILS NFR BLD AUTO: 53.9 % (ref 53–65)
NITRITE UR QL STRIP: NEGATIVE
NONHDLC SERPL-MCNC: 76 MG/DL
NRBC # BLD AUTO: 0 X10E3/UL
NRBC, AUTO (.00): 0 %
PH UR STRIP: 5.5 PH UNITS
PLATELET # BLD AUTO: 147 K/UL (ref 150–400)
POTASSIUM SERPL-SCNC: 3.4 MMOL/L (ref 3.5–5.1)
PROT SERPL-MCNC: 7.7 G/DL (ref 6.4–8.2)
PROT UR QL STRIP: 30
RBC # BLD AUTO: 4.87 M/UL (ref 4.2–5.4)
RBC # UR STRIP: NEGATIVE /UL
RBC #/AREA URNS HPF: 7 /HPF
SODIUM SERPL-SCNC: 143 MMOL/L (ref 136–145)
SP GR UR STRIP: 1.03
SQUAMOUS #/AREA URNS LPF: ABNORMAL /HPF
TRIGL SERPL-MCNC: 136 MG/DL (ref 35–150)
UROBILINOGEN UR STRIP-ACNC: NORMAL MG/DL
VLDLC SERPL-MCNC: 27 MG/DL
WBC # BLD AUTO: 7.02 K/UL (ref 4.5–11)
WBC #/AREA URNS HPF: 3 /HPF

## 2023-11-15 PROCEDURE — 90686 IIV4 VACC NO PRSV 0.5 ML IM: CPT | Mod: ,,, | Performed by: NURSE PRACTITIONER

## 2023-11-15 PROCEDURE — 90471 FLU VACCINE (QUAD) GREATER THAN OR EQUAL TO 3YO PRESERVATIVE FREE IM: ICD-10-PCS | Mod: ,,, | Performed by: NURSE PRACTITIONER

## 2023-11-15 PROCEDURE — 82043 UR ALBUMIN QUANTITATIVE: CPT | Mod: ,,, | Performed by: CLINICAL MEDICAL LABORATORY

## 2023-11-15 PROCEDURE — 80053 COMPREHEN METABOLIC PANEL: CPT | Mod: ,,, | Performed by: CLINICAL MEDICAL LABORATORY

## 2023-11-15 PROCEDURE — 3078F PR MOST RECENT DIASTOLIC BLOOD PRESSURE < 80 MM HG: ICD-10-PCS | Mod: CPTII,,, | Performed by: NURSE PRACTITIONER

## 2023-11-15 PROCEDURE — 80061 LIPID PANEL: CPT | Mod: ,,, | Performed by: CLINICAL MEDICAL LABORATORY

## 2023-11-15 PROCEDURE — 3044F PR MOST RECENT HEMOGLOBIN A1C LEVEL <7.0%: ICD-10-PCS | Mod: CPTII,,, | Performed by: NURSE PRACTITIONER

## 2023-11-15 PROCEDURE — 90480 COVID-19 VAC, MRNA 2023 (MODERNA)(PF) 50 MCG/0.5 ML IM SUSR (12+): ICD-10-PCS | Mod: ,,, | Performed by: NURSE PRACTITIONER

## 2023-11-15 PROCEDURE — 1159F PR MEDICATION LIST DOCUMENTED IN MEDICAL RECORD: ICD-10-PCS | Mod: CPTII,,, | Performed by: NURSE PRACTITIONER

## 2023-11-15 PROCEDURE — 85025 COMPLETE CBC W/AUTO DIFF WBC: CPT | Mod: ,,, | Performed by: CLINICAL MEDICAL LABORATORY

## 2023-11-15 PROCEDURE — 83036 HEMOGLOBIN A1C: ICD-10-PCS | Mod: ,,, | Performed by: CLINICAL MEDICAL LABORATORY

## 2023-11-15 PROCEDURE — 83036 HEMOGLOBIN GLYCOSYLATED A1C: CPT | Mod: ,,, | Performed by: CLINICAL MEDICAL LABORATORY

## 2023-11-15 PROCEDURE — 99396 PREV VISIT EST AGE 40-64: CPT | Mod: 25,,, | Performed by: NURSE PRACTITIONER

## 2023-11-15 PROCEDURE — 80061 LIPID PANEL: ICD-10-PCS | Mod: ,,, | Performed by: CLINICAL MEDICAL LABORATORY

## 2023-11-15 PROCEDURE — 82570 MICROALBUMIN / CREATININE RATIO URINE: ICD-10-PCS | Mod: ,,, | Performed by: CLINICAL MEDICAL LABORATORY

## 2023-11-15 PROCEDURE — 3078F DIAST BP <80 MM HG: CPT | Mod: CPTII,,, | Performed by: NURSE PRACTITIONER

## 2023-11-15 PROCEDURE — 3074F SYST BP LT 130 MM HG: CPT | Mod: CPTII,,, | Performed by: NURSE PRACTITIONER

## 2023-11-15 PROCEDURE — 81001 URINALYSIS, MICROSCOPIC: ICD-10-PCS | Mod: ,,, | Performed by: CLINICAL MEDICAL LABORATORY

## 2023-11-15 PROCEDURE — 4010F PR ACE/ARB THEARPY RXD/TAKEN: ICD-10-PCS | Mod: CPTII,,, | Performed by: NURSE PRACTITIONER

## 2023-11-15 PROCEDURE — 90471 IMMUNIZATION ADMIN: CPT | Mod: ,,, | Performed by: NURSE PRACTITIONER

## 2023-11-15 PROCEDURE — 91322 SARSCOV2 VAC 50 MCG/0.5ML IM: CPT | Mod: ,,, | Performed by: NURSE PRACTITIONER

## 2023-11-15 PROCEDURE — 1159F MED LIST DOCD IN RCRD: CPT | Mod: CPTII,,, | Performed by: NURSE PRACTITIONER

## 2023-11-15 PROCEDURE — 4010F ACE/ARB THERAPY RXD/TAKEN: CPT | Mod: CPTII,,, | Performed by: NURSE PRACTITIONER

## 2023-11-15 PROCEDURE — 99396 PR PREVENTIVE VISIT,EST,40-64: ICD-10-PCS | Mod: 25,,, | Performed by: NURSE PRACTITIONER

## 2023-11-15 PROCEDURE — 91322 PR SARS-COV- 2 COVID-19 VACCINE, 50MCG/0.5ML, IM: ICD-10-PCS | Mod: ,,, | Performed by: NURSE PRACTITIONER

## 2023-11-15 PROCEDURE — 85025 CBC WITH DIFFERENTIAL: ICD-10-PCS | Mod: ,,, | Performed by: CLINICAL MEDICAL LABORATORY

## 2023-11-15 PROCEDURE — 82043 MICROALBUMIN / CREATININE RATIO URINE: ICD-10-PCS | Mod: ,,, | Performed by: CLINICAL MEDICAL LABORATORY

## 2023-11-15 PROCEDURE — 3008F BODY MASS INDEX DOCD: CPT | Mod: CPTII,,, | Performed by: NURSE PRACTITIONER

## 2023-11-15 PROCEDURE — 80053 COMPREHENSIVE METABOLIC PANEL: ICD-10-PCS | Mod: ,,, | Performed by: CLINICAL MEDICAL LABORATORY

## 2023-11-15 PROCEDURE — 82570 ASSAY OF URINE CREATININE: CPT | Mod: ,,, | Performed by: CLINICAL MEDICAL LABORATORY

## 2023-11-15 PROCEDURE — 90686 FLU VACCINE (QUAD) GREATER THAN OR EQUAL TO 3YO PRESERVATIVE FREE IM: ICD-10-PCS | Mod: ,,, | Performed by: NURSE PRACTITIONER

## 2023-11-15 PROCEDURE — 3008F PR BODY MASS INDEX (BMI) DOCUMENTED: ICD-10-PCS | Mod: CPTII,,, | Performed by: NURSE PRACTITIONER

## 2023-11-15 PROCEDURE — 90480 ADMN SARSCOV2 VAC 1/ONLY CMP: CPT | Mod: ,,, | Performed by: NURSE PRACTITIONER

## 2023-11-15 PROCEDURE — 3044F HG A1C LEVEL LT 7.0%: CPT | Mod: CPTII,,, | Performed by: NURSE PRACTITIONER

## 2023-11-15 PROCEDURE — 81001 URINALYSIS AUTO W/SCOPE: CPT | Mod: ,,, | Performed by: CLINICAL MEDICAL LABORATORY

## 2023-11-15 PROCEDURE — 3074F PR MOST RECENT SYSTOLIC BLOOD PRESSURE < 130 MM HG: ICD-10-PCS | Mod: CPTII,,, | Performed by: NURSE PRACTITIONER

## 2023-11-15 RX ORDER — METFORMIN HYDROCHLORIDE 500 MG/1
500 TABLET ORAL 2 TIMES DAILY WITH MEALS
Qty: 180 TABLET | Refills: 1 | Status: SHIPPED | OUTPATIENT
Start: 2023-11-15 | End: 2023-11-27 | Stop reason: SDUPTHER

## 2023-11-15 RX ORDER — CETIRIZINE HYDROCHLORIDE 10 MG/1
10 TABLET ORAL DAILY
Qty: 90 TABLET | Refills: 1 | Status: SHIPPED | OUTPATIENT
Start: 2023-11-15 | End: 2024-11-14

## 2023-11-15 NOTE — PROGRESS NOTES
SUNI Cruz   [facility]  [address]   [place] [address]     PATIENT NAME: Glendy Engle  : 1975  DATE: 11/15/23  MRN: [id number]      Billing Provider: SUNI Cruz  Level of Service:   Patient PCP Information       Provider PCP Type    SUNI Cruz General            Reason for Visit / Chief Complaint: wellness (Ambetter wellness/)         History of Present Illness / Problem Focused Workflow     Glendy Engle is a 48 y.o. female presents to the clinic  for ambetter wellness exam.  She has history of HTN and is well controlled with current meds She has type 2 diabetes and   controlled with metformin but will have some readings over 200 with last A1c 5.2. she has lots of problems with abdominal pain and reflux and follows with Dr Rico with EGD and is now checking for fatty liver--she has follow up in 6 months.  She is currently taking Dexilant.  She has appt with Dr Lei German for follow up of goiter  upcoming.  She had some swelling of her left neck  recently and resolved spontaneously without meds.   She has some problems with wheezing and  shortness of breath and uses Symbicor prn-- she has intermittent asthma.  She is currently taking paxil  for anxiety and it is working good.   She has chronic swelling in LE's and this has improved and not  taking lasix. Tolerates Simvastatin well for lipids.   She has chronic  back pain and  follows with Shanthi March NP with Dr Hassan and takes hydrocodone 3 times daily without excess drowsiness.  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     She has increased compliance with her CPAP and feels better.     Needs med refills  and needs covid/flu vaccine.   She needs her mammogram scheduled at [facility] and will need to have pap smear done  for vaginal cancer same day.  She notes 2 day history of dysuria and will need to get a urine specimen.      Review of Systems     Review of Systems   Constitutional:  Negative for  fatigue.   HENT:  Positive for nasal congestion. Negative for sore throat.    Respiratory:  Negative for cough, chest tightness and shortness of breath.    Cardiovascular:  Negative for chest pain, palpitations and leg swelling.   Gastrointestinal:  Positive for reflux. Negative for nausea and vomiting.   Genitourinary:  Positive for dysuria.   Musculoskeletal:  Positive for myalgias (upper back/neck area).   Neurological:  Negative for weakness and memory loss.   Psychiatric/Behavioral:  Negative for confusion and sleep disturbance.         Medical / Social / Family History     Past Medical History:   Diagnosis Date    Abnormal pulmonary function test 12/2019    Abnormal radiograph     Asthma     GERD (gastroesophageal reflux disease)     Hypertension     SHERLYN on CPAP     Thyroid nodule        Past Surgical History:   Procedure Laterality Date    CHOLECYSTECTOMY      COLONOSCOPY      ESOPHAGOGASTRODUODENOSCOPY      HYSTERECTOMY      LEG SURGERY      vein surgery on right leg    REDUCTION OF BOTH BREASTS      VASCULAR SURGERY      Rt GSV Laser Ablation Phlebectomy: Dr. Sergey Esteves  2017       Social History  Ms.  reports that she has never smoked. She has never been exposed to tobacco smoke. She has never used smokeless tobacco. She reports that she does not currently use alcohol. She reports that she does not use drugs.    Family History  Ms.'s family history includes Cancer in her father and maternal aunt; Diabetes in her maternal grandmother and paternal grandmother; Heart disease in her mother and paternal grandfather; Heart failure in her mother; Hypertension in her mother.    Medications and Allergies     Medications  Outpatient Medications Marked as Taking for the 11/15/23 encounter (Office Visit) with Kelli Paez FNP   Medication Sig Dispense Refill    albuterol (PROVENTIL/VENTOLIN HFA) 90 mcg/actuation inhaler Inhale 2 puffs into the lungs every 6 (six) hours as needed for Wheezing. Rescue 18 g 5     "budesonide-formoterol 160-4.5 mcg (SYMBICORT) 160-4.5 mcg/actuation HFAA Inhale 2 puffs into the lungs every 12 (twelve) hours. Controller 10.2 g 5    cyclobenzaprine (FLEXERIL) 10 MG tablet Take 10 mg by mouth 3 (three) times daily as needed for Muscle spasms.      dexlansoprazole (DEXILANT) 60 mg capsule Take by mouth.      fexofenadine (ALLEGRA) 180 MG tablet Take 1 tablet (180 mg total) by mouth once daily. 90 tablet 3    furosemide (LASIX) 20 MG tablet richar 1-2 tabs daily as needed for swelling.  Take potassium with Lasix/tm 60 tablet 11    HYDROcodone-acetaminophen (NORCO)  mg per tablet Take 1 tablet by mouth 2 (two) times daily.      lisinopriL (PRINIVIL,ZESTRIL) 20 MG tablet Take 1 tablet (20 mg total) by mouth once daily. 90 tablet 3    metFORMIN (GLUCOPHAGE) 500 MG tablet Take 1 tablet (500 mg total) by mouth 2 (two) times daily with meals. 180 tablet 1    paroxetine (PAXIL) 20 MG tablet Take 1 tablet (20 mg total) by mouth every evening. 90 tablet 3    simvastatin (ZOCOR) 40 MG tablet Take 1 tablet (40 mg total) by mouth every evening. 90 tablet 3       Allergies  Review of patient's allergies indicates:   Allergen Reactions    Bactrim [sulfamethoxazole-trimethoprim] Hives       Physical Examination     Vitals:    11/15/23 0922   BP: 123/69   BP Location: Left arm   Patient Position: Sitting   BP Method: Large (Automatic)   Pulse: 64   Resp: 18   Temp: 98.1 °F (36.7 °C)   TempSrc: Oral   SpO2: 96%   Weight: 114.8 kg (253 lb)   Height: 5' 3" (1.6 m)      Physical Exam  Constitutional:       Appearance: She is obese.   HENT:      Right Ear: Tympanic membrane, ear canal and external ear normal.      Left Ear: Tympanic membrane, ear canal and external ear normal.      Nose: Nose normal.      Mouth/Throat:      Pharynx: Posterior oropharyngeal erythema present.   Cardiovascular:      Rate and Rhythm: Normal rate and regular rhythm.   Pulmonary:      Effort: Pulmonary effort is normal.      Breath sounds: " Normal breath sounds.   Abdominal:      Palpations: Abdomen is soft.   Musculoskeletal:      Right lower leg: No edema.      Left lower leg: No edema.   Lymphadenopathy:      Cervical: No cervical adenopathy.   Skin:     General: Skin is warm.   Neurological:      Mental Status: She is alert and oriented to person, place, and time.   Psychiatric:         Mood and Affect: Mood normal.         Behavior: Behavior normal.          Assessment and Plan (including Health Maintenance)     :    Plan:  will check labs today and refill meds.  Refer to [facility] for pap smear in [facility] and schedule mammogram at [place] same day.  Flu and Covid vaccine today  Encourage weight loss and regular exercise.   Follow up with all specialists as indicated.        Health Maintenance Due   Topic Date Due    HIV Screening  Never done    Influenza Vaccine (1) 09/01/2023    COVID-19 Vaccine (4 - 2023-24 season) 09/01/2023    Mammogram  11/28/2023       Problem List Items Addressed This Visit    None  .  There are no diagnoses linked to this encounter.   Health Maintenance Topics with due status: Not Due       Topic Last Completion Date    TETANUS VACCINE 05/30/2017    Hemoglobin A1c (Prediabetes) 07/12/2023    Lipid Panel 07/12/2023    Colorectal Cancer Screening 09/14/2023       Procedures     Future Appointments   Date Time Provider Department Center   12/12/2023  [facility]   12/12/2023  9:45 AM Ed German [facility]   1/10/2024  2:45 PM Elmer Cross DO [facility]   11/19/2024  9:30 AM Kelli Paez FNP [facility]        No follow-ups on file.     Signature:  SUNI Cruz    Date of encounter: 11/15/23

## 2023-11-16 NOTE — PROGRESS NOTES
Notify Glendy that her urine test was normal, no sign of infection. Chem panel shows slightly  low potassium--needs some foods high in potassium such as banana every day.   Liver test still just a little high.  Blood count with no anemia, A1c non diabetic at 5.4. cholesterol is normal./tm

## 2023-11-27 DIAGNOSIS — E11.9 DIABETES MELLITUS WITHOUT COMPLICATION: Chronic | ICD-10-CM

## 2023-11-27 RX ORDER — BUSPIRONE HYDROCHLORIDE 5 MG/1
1 TABLET ORAL 3 TIMES DAILY PRN
COMMUNITY
End: 2023-12-28

## 2023-11-27 RX ORDER — METFORMIN HYDROCHLORIDE 500 MG/1
500 TABLET ORAL 2 TIMES DAILY WITH MEALS
Qty: 180 TABLET | Refills: 1 | Status: SHIPPED | OUTPATIENT
Start: 2023-11-27

## 2023-11-27 RX ORDER — POLYETHYLENE GLYCOL 3350, SODIUM CHLORIDE, SODIUM BICARBONATE, POTASSIUM CHLORIDE 420; 11.2; 5.72; 1.48 G/4L; G/4L; G/4L; G/4L
POWDER, FOR SOLUTION ORAL
COMMUNITY
Start: 2023-08-23 | End: 2023-12-28

## 2023-11-27 NOTE — TELEPHONE ENCOUNTER
----- Message from Ronna Maddox sent at 11/27/2023  1:08 PM CST -----  Needs refills on metformin called in to [facility] pharmacy In [place]

## 2023-12-11 DIAGNOSIS — E04.1 THYROID NODULE: Primary | ICD-10-CM

## 2023-12-21 ENCOUNTER — TELEPHONE (OUTPATIENT)
Dept: SLEEP MEDICINE | Facility: CLINIC | Age: 48
End: 2023-12-21
Payer: COMMERCIAL

## 2023-12-21 ENCOUNTER — OFFICE VISIT (OUTPATIENT)
Dept: SURGERY | Facility: CLINIC | Age: 48
End: 2023-12-21
Attending: SURGERY
Payer: COMMERCIAL

## 2023-12-21 ENCOUNTER — HOSPITAL ENCOUNTER (OUTPATIENT)
Dept: RADIOLOGY | Facility: HOSPITAL | Age: 48
Discharge: HOME | End: 2023-12-21
Attending: SURGERY
Payer: COMMERCIAL

## 2023-12-21 DIAGNOSIS — E04.1 THYROID NODULE: ICD-10-CM

## 2023-12-21 DIAGNOSIS — E04.1 THYROID NODULE: Primary | ICD-10-CM

## 2023-12-21 PROCEDURE — 3044F HG A1C LEVEL LT 7.0%: CPT | Mod: CPTII,,, | Performed by: SURGERY

## 2023-12-21 PROCEDURE — 4010F PR ACE/ARB THEARPY RXD/TAKEN: ICD-10-PCS | Mod: CPTII,,, | Performed by: SURGERY

## 2023-12-21 PROCEDURE — 1159F MED LIST DOCD IN RCRD: CPT | Mod: CPTII,,, | Performed by: SURGERY

## 2023-12-21 PROCEDURE — 76536 US EXAM OF HEAD AND NECK: CPT | Mod: TC

## 2023-12-21 PROCEDURE — 3066F PR DOCUMENTATION OF TREATMENT FOR NEPHROPATHY: ICD-10-PCS | Mod: CPTII,,, | Performed by: SURGERY

## 2023-12-21 PROCEDURE — 3044F PR MOST RECENT HEMOGLOBIN A1C LEVEL <7.0%: ICD-10-PCS | Mod: CPTII,,, | Performed by: SURGERY

## 2023-12-21 PROCEDURE — 99213 OFFICE O/P EST LOW 20 MIN: CPT | Mod: S$PBB,,, | Performed by: SURGERY

## 2023-12-21 PROCEDURE — 4010F ACE/ARB THERAPY RXD/TAKEN: CPT | Mod: CPTII,,, | Performed by: SURGERY

## 2023-12-21 PROCEDURE — 3061F PR NEG MICROALBUMINURIA RESULT DOCUMENTED/REVIEW: ICD-10-PCS | Mod: CPTII,,, | Performed by: SURGERY

## 2023-12-21 PROCEDURE — 76536 US THYROID: ICD-10-PCS | Mod: 26,,, | Performed by: RADIOLOGY

## 2023-12-21 PROCEDURE — 99213 PR OFFICE/OUTPT VISIT, EST, LEVL III, 20-29 MIN: ICD-10-PCS | Mod: S$PBB,,, | Performed by: SURGERY

## 2023-12-21 PROCEDURE — 1159F PR MEDICATION LIST DOCUMENTED IN MEDICAL RECORD: ICD-10-PCS | Mod: CPTII,,, | Performed by: SURGERY

## 2023-12-21 PROCEDURE — 3066F NEPHROPATHY DOC TX: CPT | Mod: CPTII,,, | Performed by: SURGERY

## 2023-12-21 PROCEDURE — 99213 OFFICE O/P EST LOW 20 MIN: CPT | Mod: PBBFAC | Performed by: SURGERY

## 2023-12-21 PROCEDURE — 76536 US EXAM OF HEAD AND NECK: CPT | Mod: 26,,, | Performed by: RADIOLOGY

## 2023-12-21 PROCEDURE — 3061F NEG MICROALBUMINURIA REV: CPT | Mod: CPTII,,, | Performed by: SURGERY

## 2023-12-22 NOTE — PROGRESS NOTES
General Surgery Progress Note    Subjective:      Patient ID: Glendy Engle is a 48 y.o. female.    Chief Complaint: Thyroid Problem (F/u thyroid u/s)      HPI:  48-year-old female here for yearly follow-up for thyroid nodules.  She has been having some problems with her lower jaw area.  Otherwise no problems with choking, swallowing, neck pain.    Past Medical History:   Diagnosis Date    Abnormal pulmonary function test 12/2019    Abnormal radiograph     Asthma     GERD (gastroesophageal reflux disease)     Hypertension     SHERLYN on CPAP     Thyroid nodule      Past Surgical History:   Procedure Laterality Date    CHOLECYSTECTOMY      COLONOSCOPY      ESOPHAGOGASTRODUODENOSCOPY      HYSTERECTOMY      LEG SURGERY      vein surgery on right leg    REDUCTION OF BOTH BREASTS      VASCULAR SURGERY      Rt GSV Laser Ablation Phlebectomy: Dr. Sergey Esteves  2017     Social History     Socioeconomic History    Marital status:    Tobacco Use    Smoking status: Never     Passive exposure: Never    Smokeless tobacco: Never   Substance and Sexual Activity    Alcohol use: Not Currently    Drug use: Never    Sexual activity: Yes     Social Determinants of Health     Financial Resource Strain: Low Risk  (7/12/2023)    Overall Financial Resource Strain (CARDIA)     Difficulty of Paying Living Expenses: Not very hard   Food Insecurity: Food Insecurity Present (7/12/2023)    Hunger Vital Sign     Worried About Running Out of Food in the Last Year: Sometimes true     Ran Out of Food in the Last Year: Sometimes true   Transportation Needs: No Transportation Needs (7/12/2023)    PRAPARE - Transportation     Lack of Transportation (Medical): No     Lack of Transportation (Non-Medical): No   Physical Activity: Inactive (7/12/2023)    Exercise Vital Sign     Days of Exercise per Week: 5 days     Minutes of Exercise per Session: 0 min   Stress: No Stress Concern Present  (7/12/2023)    [employer] - Occupational Stress Questionnaire     Feeling of Stress : Only a little   Social Connections: Moderately Integrated (7/12/2023)    Social Connection and Isolation Panel [NHANES]     Frequency of Communication with Friends and Family: More than three times a week     Frequency of Social Gatherings with Friends and Family: Once a week     Attends Gnosticist Services: More than 4 times per year     Active Member of Clubs or Organizations: No     Attends Club or Organization Meetings: Never     Marital Status:    Housing Stability: Low Risk  (7/12/2023)    Housing Stability Vital Sign     Unable to Pay for Housing in the Last Year: No     Number of Places Lived in the Last Year: 1     Unstable Housing in the Last Year: No         Current Outpatient Medications:     albuterol (PROVENTIL/VENTOLIN HFA) 90 mcg/actuation inhaler, Inhale 2 puffs into the lungs every 6 (six) hours as needed for Wheezing. Rescue, Disp: 18 g, Rfl: 5    budesonide-formoterol 160-4.5 mcg (SYMBICORT) 160-4.5 mcg/actuation HFAA, Inhale 2 puffs into the lungs every 12 (twelve) hours. Controller, Disp: 10.2 g, Rfl: 5    busPIRone (BUSPAR) 5 MG Tab, Take 1 tablet by mouth 3 times daily as needed., Disp: , Rfl:     cetirizine (ZYRTEC) 10 MG tablet, Take 1 tablet (10 mg total) by mouth once daily., Disp: 90 tablet, Rfl: 1    cyclobenzaprine (FLEXERIL) 10 MG tablet, Take 10 mg by mouth 3 (three) times daily as needed for Muscle spasms., Disp: , Rfl:     dexlansoprazole (DEXILANT) 60 mg capsule, Take by mouth., Disp: , Rfl:     fexofenadine (ALLEGRA) 180 MG tablet, Take 1 tablet (180 mg total) by mouth once daily., Disp: 90 tablet, Rfl: 3    furosemide (LASIX) 20 MG tablet, richar 1-2 tabs daily as needed for swelling.  Take potassium with Lasix/tm, Disp: 60 tablet, Rfl: 11    HYDROcodone-acetaminophen (NORCO)  mg per tablet, Take 1 tablet by mouth 2 (two) times daily., Disp: , Rfl:      lisinopriL (PRINIVIL,ZESTRIL) 20 MG tablet, Take 1 tablet (20 mg total) by mouth once daily., Disp: 90 tablet, Rfl: 3    metFORMIN (GLUCOPHAGE) 500 MG tablet, Take 1 tablet (500 mg total) by mouth 2 (two) times daily with meals., Disp: 180 tablet, Rfl: 1    paroxetine (PAXIL) 20 MG tablet, Take 1 tablet (20 mg total) by mouth every evening., Disp: 90 tablet, Rfl: 3    peg-electrolyte soln (NULYTELY LEMON-LIME) 420 gram SolR, take as directed, Disp: , Rfl:     simvastatin (ZOCOR) 40 MG tablet, Take 1 tablet (40 mg total) by mouth every evening., Disp: 90 tablet, Rfl: 3  Review of patient's allergies indicates:   Allergen Reactions    Bactrim [sulfamethoxazole-trimethoprim] Hives       LMP 08/17/2007 (Approximate)     Review of Systems   Constitutional: Negative for chills, fever, weight gain and weight loss.   Eyes:  Negative for blurred vision.   Cardiovascular:  Negative for chest pain, claudication and palpitations.   Respiratory:  Negative for cough and shortness of breath.    Musculoskeletal:  Negative for muscle weakness.   Gastrointestinal:  Negative for abdominal pain, diarrhea, nausea and vomiting.   Neurological:  Negative for numbness and paresthesias.         Objective:     Constitutional: Well, No apparent distress  Mental Status: Alert and oriented  x 3  Eyes: Normal sclera, normal eyelid  Neck: Trachea midline, no masses on neck exam  Respiratory: Clear to auscultation bilaterally with no wheezes/crackles/cough  Cardiac: Regular rate and rhythm, no murmur, rub, or gallops  Abdominal: Soft, non-tender, non-distented  Hernia: No appreciated hernias  Musculoskeletal: 5/5 strength no weakess no decreased range of montion  Neurologic: Cranial nerves II - XII intact    Labs/ Imaging: CBC:   Lab Results   Component Value Date/Time    WBC 7.02 11/15/2023 10:04 AM    RBC 4.87 11/15/2023 10:04 AM    HGB 15.5 11/15/2023 10:04 AM    HCT 45.5 11/15/2023 10:04 AM     (L) 11/15/2023 10:04 AM    MCV 93.4  11/15/2023 10:04 AM    MCH 31.8 (H) 11/15/2023 10:04 AM    MCHC 34.1 11/15/2023 10:04 AM     BMP:   Lab Results   Component Value Date/Time    GLU 77 11/15/2023 10:04 AM    CO2 33 (H) 11/15/2023 10:04 AM    BUN 13 11/15/2023 10:04 AM    CREATININE 0.67 11/15/2023 10:04 AM    CALCIUM 9.2 11/15/2023 10:04 AM     CMP:   Lab Results   Component Value Date/Time    GLU 77 11/15/2023 10:04 AM    CALCIUM 9.2 11/15/2023 10:04 AM    ALBUMIN 3.6 11/15/2023 10:04 AM    PROT 7.7 11/15/2023 10:04 AM     11/15/2023 10:04 AM    K 3.4 (L) 11/15/2023 10:04 AM    CO2 33 (H) 11/15/2023 10:04 AM     11/15/2023 10:04 AM    BUN 13 11/15/2023 10:04 AM    CREATININE 0.67 11/15/2023 10:04 AM    ALKPHOS 131 (H) 11/15/2023 10:04 AM    ALT 55 11/15/2023 10:04 AM    AST 63 (H) 11/15/2023 10:04 AM    BILITOT 0.4 11/15/2023 10:04 AM     LFTs:   Lab Results   Component Value Date/Time    ALT 55 11/15/2023 10:04 AM    AST 63 (H) 11/15/2023 10:04 AM    ALKPHOS 131 (H) 11/15/2023 10:04 AM    BILITOT 0.4 11/15/2023 10:04 AM    PROT 7.7 11/15/2023 10:04 AM    ALBUMIN 3.6 11/15/2023 10:04 AM       Narrative & Impression  EXAMINATION:  US THYROID     CLINICAL HISTORY:  Nontoxic single thyroid nodule     COMPARISON:  Thyroid ultrasound December 13 2023.     TECHNIQUE:  Multiple longitudinal and transverse real-time sonographic images of the thyroid are obtained.     FINDINGS:  The right lobe of the thyroid measures 4.0 x 1.1 x 1.3 cm. The left lobe of the thyroid measures 4.0 x 1.8 x 1.8 cm. The thyroid isthmus measures 0.4 cm in thickness.     Multinodular thyroid demonstrated.  Calcified nodule on the left measures up to 1.4 cm.  Largest nodule on the right measures approximately 9 mm.  Largest nodule on the left measures approximately 9 mm.     Impression:     Multinodular thyroid as detailed.  None of these lesions meet FNA criteria at this time.  Recommend continued ultrasound surveillance.     Point of Service: [facility]         Electronically signed by: Umesh Saravia  Date:                                            12/21/2023  Time:                                           16:13    Assessment:             1. Thyroid nodule          Plan:       Orders Placed This Encounter    US Thyroid     No follow-ups on file.  Patient to come back in 1 year time with a repeat ultrasound.  She will come back any sooner for any worsening symptoms or complaints.  All questions were answered.

## 2023-12-28 ENCOUNTER — OFFICE VISIT (OUTPATIENT)
Dept: FAMILY MEDICINE | Facility: CLINIC | Age: 48
End: 2023-12-28
Payer: COMMERCIAL

## 2023-12-28 VITALS
WEIGHT: 252.63 LBS | SYSTOLIC BLOOD PRESSURE: 138 MMHG | TEMPERATURE: 99 F | BODY MASS INDEX: 44.76 KG/M2 | DIASTOLIC BLOOD PRESSURE: 85 MMHG | HEIGHT: 63 IN | RESPIRATION RATE: 18 BRPM | HEART RATE: 78 BPM | OXYGEN SATURATION: 97 %

## 2023-12-28 DIAGNOSIS — J40 BRONCHITIS: ICD-10-CM

## 2023-12-28 DIAGNOSIS — R05.1 ACUTE COUGH: Primary | ICD-10-CM

## 2023-12-28 LAB
CTP QC/QA: YES
RSV RAPID ANTIGEN: NEGATIVE

## 2023-12-28 PROCEDURE — 3066F PR DOCUMENTATION OF TREATMENT FOR NEPHROPATHY: ICD-10-PCS | Mod: CPTII,,, | Performed by: NURSE PRACTITIONER

## 2023-12-28 PROCEDURE — 3066F NEPHROPATHY DOC TX: CPT | Mod: CPTII,,, | Performed by: NURSE PRACTITIONER

## 2023-12-28 PROCEDURE — 3044F PR MOST RECENT HEMOGLOBIN A1C LEVEL <7.0%: ICD-10-PCS | Mod: CPTII,,, | Performed by: NURSE PRACTITIONER

## 2023-12-28 PROCEDURE — 99213 PR OFFICE/OUTPT VISIT, EST, LEVL III, 20-29 MIN: ICD-10-PCS | Mod: ,,, | Performed by: NURSE PRACTITIONER

## 2023-12-28 PROCEDURE — 87634 RSV DNA/RNA AMP PROBE: CPT | Mod: QW,,, | Performed by: NURSE PRACTITIONER

## 2023-12-28 PROCEDURE — 3061F PR NEG MICROALBUMINURIA RESULT DOCUMENTED/REVIEW: ICD-10-PCS | Mod: CPTII,,, | Performed by: NURSE PRACTITIONER

## 2023-12-28 PROCEDURE — 3008F PR BODY MASS INDEX (BMI) DOCUMENTED: ICD-10-PCS | Mod: CPTII,,, | Performed by: NURSE PRACTITIONER

## 2023-12-28 PROCEDURE — 3079F PR MOST RECENT DIASTOLIC BLOOD PRESSURE 80-89 MM HG: ICD-10-PCS | Mod: CPTII,,, | Performed by: NURSE PRACTITIONER

## 2023-12-28 PROCEDURE — 3075F SYST BP GE 130 - 139MM HG: CPT | Mod: CPTII,,, | Performed by: NURSE PRACTITIONER

## 2023-12-28 PROCEDURE — 3079F DIAST BP 80-89 MM HG: CPT | Mod: CPTII,,, | Performed by: NURSE PRACTITIONER

## 2023-12-28 PROCEDURE — 99213 OFFICE O/P EST LOW 20 MIN: CPT | Mod: ,,, | Performed by: NURSE PRACTITIONER

## 2023-12-28 PROCEDURE — 3061F NEG MICROALBUMINURIA REV: CPT | Mod: CPTII,,, | Performed by: NURSE PRACTITIONER

## 2023-12-28 PROCEDURE — 3008F BODY MASS INDEX DOCD: CPT | Mod: CPTII,,, | Performed by: NURSE PRACTITIONER

## 2023-12-28 PROCEDURE — 3044F HG A1C LEVEL LT 7.0%: CPT | Mod: CPTII,,, | Performed by: NURSE PRACTITIONER

## 2023-12-28 PROCEDURE — 1159F PR MEDICATION LIST DOCUMENTED IN MEDICAL RECORD: ICD-10-PCS | Mod: CPTII,,, | Performed by: NURSE PRACTITIONER

## 2023-12-28 PROCEDURE — 3075F PR MOST RECENT SYSTOLIC BLOOD PRESS GE 130-139MM HG: ICD-10-PCS | Mod: CPTII,,, | Performed by: NURSE PRACTITIONER

## 2023-12-28 PROCEDURE — 87634 POCT RAPID RSV: ICD-10-PCS | Mod: QW,,, | Performed by: NURSE PRACTITIONER

## 2023-12-28 PROCEDURE — 4010F PR ACE/ARB THEARPY RXD/TAKEN: ICD-10-PCS | Mod: CPTII,,, | Performed by: NURSE PRACTITIONER

## 2023-12-28 PROCEDURE — 4010F ACE/ARB THERAPY RXD/TAKEN: CPT | Mod: CPTII,,, | Performed by: NURSE PRACTITIONER

## 2023-12-28 PROCEDURE — 1159F MED LIST DOCD IN RCRD: CPT | Mod: CPTII,,, | Performed by: NURSE PRACTITIONER

## 2023-12-28 RX ORDER — PREDNISONE 5 MG/1
5 TABLET ORAL DAILY
Qty: 5 TABLET | Refills: 0 | Status: SHIPPED | OUTPATIENT
Start: 2023-12-28 | End: 2024-02-27

## 2023-12-28 RX ORDER — AZITHROMYCIN 250 MG/1
TABLET, FILM COATED ORAL
Qty: 6 TABLET | Refills: 0 | Status: SHIPPED | OUTPATIENT
Start: 2023-12-28

## 2023-12-28 NOTE — PROGRESS NOTES
SUNI Cruz   [facility]  [address]   [place] [address]     PATIENT NAME: Glendy Engle  : 1975  DATE: 23  MRN: [id number]      Billing Provider: SUNI Cruz  Level of Service:   Patient PCP Information       Provider PCP Type    SUNI Cruz General            Reason for Visit / Chief Complaint: Cough (Since  has been coughing, chest hurts, S.O.B., body aches and a headache)         History of Present Illness / Problem Focused Workflow     Glendy Engle is a 48 y.o. female presents to the clinic       with cough, wheezing, chest congestion , body aches and headache with low grade temp.  She has been taking Zyrtec, Benadryl and  using albuterol MDI that is helping some with symptom onset 5-6 days ago. No sick contacts.  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        Review of Systems     Review of Systems   Constitutional:  Positive for fatigue and fever.   HENT:  Negative for nasal congestion and sore throat.    Respiratory:  Positive for chest tightness, shortness of breath and wheezing. Negative for cough.    Cardiovascular:  Negative for chest pain, palpitations and leg swelling.   Gastrointestinal:  Negative for nausea, vomiting and reflux.   Neurological:  Negative for weakness and memory loss.   Psychiatric/Behavioral:  Negative for confusion and sleep disturbance.         Medical / Social / Family History     Past Medical History:   Diagnosis Date    Abnormal pulmonary function test 2019    Abnormal radiograph     Asthma     GERD (gastroesophageal reflux disease)     Hypertension     SHERLYN on CPAP     Thyroid nodule        Past Surgical History:   Procedure Laterality Date    CHOLECYSTECTOMY      COLONOSCOPY      ESOPHAGOGASTRODUODENOSCOPY      HYSTERECTOMY      LEG SURGERY      vein surgery on right leg    REDUCTION OF BOTH BREASTS      VASCULAR SURGERY      Rt GSV Laser Ablation Phlebectomy: Dr. Sergey Esteves  2017       Social  History  Ms.  reports that she has never smoked. She has never been exposed to tobacco smoke. She has never used smokeless tobacco. She reports that she does not currently use alcohol. She reports that she does not use drugs.    Family History  Ms.'s family history includes Cancer in her father and maternal aunt; Diabetes in her maternal grandmother and paternal grandmother; Heart disease in her mother and paternal grandfather; Heart failure in her mother; Hypertension in her mother.    Medications and Allergies     Medications  Outpatient Medications Marked as Taking for the 12/28/23 encounter (Office Visit) with Kelli Paez FNP   Medication Sig Dispense Refill    albuterol (PROVENTIL/VENTOLIN HFA) 90 mcg/actuation inhaler Inhale 2 puffs into the lungs every 6 (six) hours as needed for Wheezing. Rescue 18 g 5    budesonide-formoterol 160-4.5 mcg (SYMBICORT) 160-4.5 mcg/actuation HFAA Inhale 2 puffs into the lungs every 12 (twelve) hours. Controller 10.2 g 5    cetirizine (ZYRTEC) 10 MG tablet Take 1 tablet (10 mg total) by mouth once daily. 90 tablet 1    cyclobenzaprine (FLEXERIL) 10 MG tablet Take 10 mg by mouth 3 (three) times daily as needed for Muscle spasms.      dexlansoprazole (DEXILANT) 60 mg capsule Take by mouth.      fexofenadine (ALLEGRA) 180 MG tablet Take 1 tablet (180 mg total) by mouth once daily. 90 tablet 3    furosemide (LASIX) 20 MG tablet richar 1-2 tabs daily as needed for swelling.  Take potassium with Lasix/tm 60 tablet 11    HYDROcodone-acetaminophen (NORCO)  mg per tablet Take 1 tablet by mouth 2 (two) times daily.      lisinopriL (PRINIVIL,ZESTRIL) 20 MG tablet Take 1 tablet (20 mg total) by mouth once daily. 90 tablet 3    metFORMIN (GLUCOPHAGE) 500 MG tablet Take 1 tablet (500 mg total) by mouth 2 (two) times daily with meals. 180 tablet 1    paroxetine (PAXIL) 20 MG tablet Take 1 tablet (20 mg total) by mouth every evening. 90 tablet 3    simvastatin (ZOCOR) 40 MG tablet Take 1  "tablet (40 mg total) by mouth every evening. 90 tablet 3       Allergies  Review of patient's allergies indicates:   Allergen Reactions    Bactrim [sulfamethoxazole-trimethoprim] Hives       Physical Examination     Vitals:    12/28/23 1359   BP: 138/85   BP Location: Left arm   Patient Position: Sitting   BP Method: Large (Automatic)   Pulse: 78   Resp: 18   Temp: 98.5 °F (36.9 °C)   TempSrc: Oral   SpO2: 97%   Weight: 114.6 kg (252 lb 9.6 oz)   Height: 5' 3" (1.6 m)      Physical Exam  HENT:      Right Ear: Tympanic membrane, ear canal and external ear normal.      Left Ear: Tympanic membrane, ear canal and external ear normal.      Nose: Congestion present.      Mouth/Throat:      Pharynx: Posterior oropharyngeal erythema (minimal) present.   Cardiovascular:      Rate and Rhythm: Normal rate and regular rhythm.      Heart sounds: Normal heart sounds.   Pulmonary:      Effort: Pulmonary effort is normal.      Breath sounds: Normal breath sounds.   Musculoskeletal:      Right lower leg: No edema.      Left lower leg: No edema.      Comments: Tender to palp anterior chest wall   Skin:     General: Skin is warm and dry.   Neurological:      Mental Status: She is alert and oriented to person, place, and time.          Assessment and Plan (including Health Maintenance)     :    Plan:  will treat bronchitis with pleuritic chest pain with steroids x5 days.  Rx    zpak #1.    Drink lots of fluids, rest.   RSV negative.         Health Maintenance Due   Topic Date Due    Foot Exam  Never done    Eye Exam  Never done    HIV Screening  Never done    Mammogram  11/28/2023       Problem List Items Addressed This Visit    None  Visit Diagnoses       Acute cough    -  Primary    Bronchitis            .  Acute cough  -     Cancel: POCT Influenza A/B  -     POCT respiratory syncytial virus    Bronchitis  -     predniSONE (DELTASONE) 5 MG tablet; Take 1 tablet (5 mg total) by mouth once daily.  Dispense: 5 tablet; Refill: 0  -     " azithromycin (ZITHROMAX Z-ROSANNE) 250 MG tablet; Take 2 tabs today and then one daily til gone  Dispense: 6 tablet; Refill: 0       Health Maintenance Topics with due status: Not Due       Topic Last Completion Date    TETANUS VACCINE 05/30/2017    Colorectal Cancer Screening 09/14/2023    Diabetes Urine Screening 11/15/2023    Lipid Panel 11/15/2023    Hemoglobin A1c 11/15/2023    Low Dose Statin 12/28/2023       Procedures     Future Appointments   Date Time Provider Department Center   2/6/2024  2:00 PM Cady Mitchell CNM [facility]   11/19/2024  9:30 AM [facility]   12/17/2024 10:00 AM [facility]         No follow-ups on file.     Signature:  SUNI Cruz    Date of encounter: 12/28/23

## 2024-01-09 DIAGNOSIS — J45.30 MILD PERSISTENT ASTHMA WITHOUT COMPLICATION: Chronic | ICD-10-CM

## 2024-01-09 RX ORDER — ALBUTEROL SULFATE 90 UG/1
2 AEROSOL, METERED RESPIRATORY (INHALATION) EVERY 6 HOURS PRN
Qty: 18 G | Refills: 5 | Status: SHIPPED | OUTPATIENT
Start: 2024-01-09

## 2024-01-09 NOTE — TELEPHONE ENCOUNTER
----- Message from Ronna Maddox sent at 1/9/2024 10:59 AM CST -----  Needs refills on albuterol called in to [facility] pharmacy in [place]

## 2024-02-06 ENCOUNTER — OFFICE VISIT (OUTPATIENT)
Dept: OBSTETRICS AND GYNECOLOGY | Facility: CLINIC | Age: 49
End: 2024-02-06
Payer: COMMERCIAL

## 2024-02-06 VITALS
BODY MASS INDEX: 49.26 KG/M2 | SYSTOLIC BLOOD PRESSURE: 139 MMHG | OXYGEN SATURATION: 97 % | DIASTOLIC BLOOD PRESSURE: 89 MMHG | HEIGHT: 63 IN | HEART RATE: 78 BPM | WEIGHT: 278 LBS

## 2024-02-06 DIAGNOSIS — Z12.72 SCREENING FOR VAGINAL CANCER: ICD-10-CM

## 2024-02-06 DIAGNOSIS — N95.2 VAGINAL ATROPHY: ICD-10-CM

## 2024-02-06 DIAGNOSIS — Z01.419 WOMEN'S ANNUAL ROUTINE GYNECOLOGICAL EXAMINATION: Primary | ICD-10-CM

## 2024-02-06 DIAGNOSIS — E66.01 MORBID OBESITY WITH BMI OF 45.0-49.9, ADULT: ICD-10-CM

## 2024-02-06 DIAGNOSIS — Z90.710 HISTORY OF TOTAL HYSTERECTOMY WITH NO HISTORY OF ABNORMAL CERVICAL PAPANICOLAOU SMEAR: ICD-10-CM

## 2024-02-06 DIAGNOSIS — N76.0 ACUTE VAGINITIS: ICD-10-CM

## 2024-02-06 PROCEDURE — 99396 PREV VISIT EST AGE 40-64: CPT | Mod: ,,, | Performed by: ADVANCED PRACTICE MIDWIFE

## 2024-02-06 PROCEDURE — 1159F MED LIST DOCD IN RCRD: CPT | Mod: CPTII,,, | Performed by: ADVANCED PRACTICE MIDWIFE

## 2024-02-06 PROCEDURE — 4010F ACE/ARB THERAPY RXD/TAKEN: CPT | Mod: CPTII,,, | Performed by: ADVANCED PRACTICE MIDWIFE

## 2024-02-06 PROCEDURE — 3075F SYST BP GE 130 - 139MM HG: CPT | Mod: CPTII,,, | Performed by: ADVANCED PRACTICE MIDWIFE

## 2024-02-06 PROCEDURE — 3008F BODY MASS INDEX DOCD: CPT | Mod: CPTII,,, | Performed by: ADVANCED PRACTICE MIDWIFE

## 2024-02-06 PROCEDURE — 3079F DIAST BP 80-89 MM HG: CPT | Mod: CPTII,,, | Performed by: ADVANCED PRACTICE MIDWIFE

## 2024-02-06 RX ORDER — METRONIDAZOLE 500 MG/1
500 TABLET ORAL 2 TIMES DAILY
Qty: 14 TABLET | Refills: 0 | Status: SHIPPED | OUTPATIENT
Start: 2024-02-06 | End: 2024-02-13

## 2024-02-06 RX ORDER — FLUCONAZOLE 100 MG/1
100 TABLET ORAL
Qty: 5 TABLET | Refills: 0 | Status: SHIPPED | OUTPATIENT
Start: 2024-02-06 | End: 2024-02-19

## 2024-02-06 RX ORDER — CLOTRIMAZOLE 1 %
CREAM (GRAM) TOPICAL 2 TIMES DAILY
Qty: 113 G | Refills: 1 | Status: SHIPPED | OUTPATIENT
Start: 2024-02-06 | End: 2024-05-22

## 2024-02-08 NOTE — PROGRESS NOTES
Patient ID: Glendy Engle is a 48 y.o. female     Chief Complaint:   Chief Complaint   Patient presents with    Annual Exam     Pt is here for annual exam pap smear. Does not stated any concerns.        HPI: Gelndy Engle is a 48 y.o. female who presents for well woman exam.  No issues, problems, or complaints. Has had hysterectomy for endometriosis, denies hx abnormal Pap. Last Pap tests NILM 11/5/21, 6/15/20, & 8/17/16. Discussed ASCCP Pap testing guidelines. Explained no Pap needed if hysterectomy done for non-cancerous reason. Agrees to forgo Pap, declines clinical breast exam as well stating recent mammogram.   LMP: Patient's last menstrual period was 08/17/2007 (approximate).  Sexually active: no   Contraceptive:hysterectomy  Mammogram: scheduled by PCP    Past Medical History:   Diagnosis Date    Abnormal pulmonary function test 12/2019    Abnormal radiograph     Asthma     GERD (gastroesophageal reflux disease)     Hypertension     SHERLYN on CPAP     Thyroid nodule        Past Surgical History:   Procedure Laterality Date    CHOLECYSTECTOMY      COLONOSCOPY      ESOPHAGOGASTRODUODENOSCOPY      HYSTERECTOMY      LEG SURGERY      vein surgery on right leg    REDUCTION OF BOTH BREASTS      VASCULAR SURGERY      Rt GSV Laser Ablation Phlebectomy: Dr. Sergey Esteves  2017       Social History     Socioeconomic History    Marital status:    Tobacco Use    Smoking status: Never     Passive exposure: Never    Smokeless tobacco: Never   Substance and Sexual Activity    Alcohol use: Not Currently    Drug use: Never    Sexual activity: Yes     Social Determinants of Health     Financial Resource Strain: Low Risk  (7/12/2023)    Overall Financial Resource Strain (CARDIA)     Difficulty of Paying Living Expenses: Not very hard   Food Insecurity: Food Insecurity Present (7/12/2023)    Hunger Vital Sign     Worried About Running Out of Food in the Last Year: Sometimes true     Ran Out of Food in the Last Year:  "Sometimes true   Transportation Needs: No Transportation Needs (7/12/2023)    PRAPARE - Transportation     Lack of Transportation (Medical): No     Lack of Transportation (Non-Medical): No   Physical Activity: Inactive (7/12/2023)    Exercise Vital Sign     Days of Exercise per Week: 5 days     Minutes of Exercise per Session: 0 min   Stress: No Stress Concern Present (7/12/2023)    [employer] - Occupational Stress Questionnaire     Feeling of Stress : Only a little   Social Connections: Moderately Integrated (7/12/2023)    Social Connection and Isolation Panel [NHANES]     Frequency of Communication with Friends and Family: More than three times a week     Frequency of Social Gatherings with Friends and Family: Once a week     Attends [employer] Services: More than 4 times per year     Active Member of Clubs or Organizations: No     Attends Club or Organization Meetings: Never     Marital Status:    Housing Stability: Low Risk  (7/12/2023)    Housing Stability Vital Sign     Unable to Pay for Housing in the Last Year: No     Number of Places Lived in the Last Year: 1     Unstable Housing in the Last Year: No       Family History   Problem Relation Age of Onset    Heart failure Mother     Heart disease Mother     Hypertension Mother     Cancer Father     Cancer Maternal Aunt     Diabetes Maternal Grandmother     Diabetes Paternal Grandmother     Heart disease Paternal Grandfather          /89 (BP Location: Left arm, Patient Position: Sitting, BP Method: Large (Automatic))   Pulse 78   Ht 5' 3" (1.6 m)   Wt 126.1 kg (278 lb)   LMP 08/17/2007 (Approximate)   SpO2 97%   BMI 49.25 kg/m²     Wt Readings from Last 3 Encounters:   02/06/24 126.1 kg (278 lb)   12/28/23 114.6 kg (252 lb 9.6 oz)   11/15/23 114.8 kg (253 lb)        ROS:  Review of Systems   Constitutional: Negative.    Eyes: Negative.    Respiratory: Negative.     Cardiovascular: Negative.    Gastrointestinal: " Negative.    Endocrine: Negative.    Genitourinary: Negative.    Musculoskeletal: Negative.    Integumentary:  Negative.   Neurological: Negative.    Hematological: Negative.    Psychiatric/Behavioral: Negative.          PHYSICAL EXAM:  Physical Exam  Constitutional:       Appearance: Normal appearance. She is obese.   HENT:      Head: Normocephalic.      Nose: Nose normal.   Eyes:      Extraocular Movements: Extraocular movements intact.   Cardiovascular:      Rate and Rhythm: Normal rate and regular rhythm.      Pulses: Normal pulses.      Heart sounds: Normal heart sounds.   Pulmonary:      Effort: Pulmonary effort is normal.      Breath sounds: Normal breath sounds.   Abdominal:      Palpations: Abdomen is soft.      Tenderness: There is no abdominal tenderness.      Hernia: There is no hernia in the left inguinal area or right inguinal area.   Genitourinary:     General: Normal vulva.      Exam position: Lithotomy position.      Pubic Area: No rash.       Labia:         Right: Rash and tenderness present.         Left: Rash and tenderness present.       Urethra: No prolapse.      Vagina: Tenderness present.      Uterus: Absent.       Adnexa:         Right: No tenderness.          Left: No tenderness.        Comments: Yeast type rash noted inner labia covering genitalia. Patchy white and red areas noted.  Uterus with cervix surgically absent. Vaginal atrophy, tenderness with insertion of small speculum.   Musculoskeletal:         General: Normal range of motion.      Cervical back: Normal range of motion and neck supple.   Skin:     General: Skin is warm and dry.   Neurological:      Mental Status: She is alert and oriented to person, place, and time.   Psychiatric:         Mood and Affect: Mood normal.         Behavior: Behavior normal.         Thought Content: Thought content normal.         Judgment: Judgment normal.          Assessment:  Women's annual routine gynecological examination    Screening for  vaginal cancer  -     Ambulatory referral/consult to Gynecology    Acute vaginitis  -     fluconazole (DIFLUCAN) 100 MG tablet; Take 1 tablet (100 mg total) by mouth Every 3 (three) days. for 5 doses  Dispense: 5 tablet; Refill: 0  -     clotrimazole (LOTRIMIN) 1 % cream; Apply topically 2 (two) times daily. for 14 days  Dispense: 113 g; Refill: 1  -     metroNIDAZOLE (FLAGYL) 500 MG tablet; Take 1 tablet (500 mg total) by mouth 2 (two) times daily. for 7 days  Dispense: 14 tablet; Refill: 0  -     Bacterial Vaginosis; Future; Expected date: 02/06/2024    History of total hysterectomy with no history of abnormal cervical Papanicolaou smear    Morbid obesity with BMI of 45.0-49.9, adult    Vaginal atrophy          ICD-10-CM ICD-9-CM    1. Women's annual routine gynecological examination  Z01.419 V72.31       2. Screening for vaginal cancer  Z12.72 V76.47 Ambulatory referral/consult to Gynecology      3. Acute vaginitis  N76.0 616.10 fluconazole (DIFLUCAN) 100 MG tablet      clotrimazole (LOTRIMIN) 1 % cream      metroNIDAZOLE (FLAGYL) 500 MG tablet      Bacterial Vaginosis      4. History of total hysterectomy with no history of abnormal cervical Papanicolaou smear  Z90.710 V88.01       5. Morbid obesity with BMI of 45.0-49.9, adult  E66.01 278.01     Z68.42 V85.42       6. Vaginal atrophy  N95.2 627.3           Plan:  Annual exam completed, no Pap needed, Affirm swab collected  Discussed treatment of vulvovaginal yeast infection  Rx sent for Diflucan, instructed on use  Rx sent for clotrimazole cream, apply BID  Rx sent for Flagyl to treat vaginitis  Encouraged to avoid wearing panty liners or undergarments when home   Patient was counseled today on ASCCP Pap guidelines and recommendations for yearly pelvic exams   Encouraged to return for follow up visit in 2 -3 weeks if no improvement in rash    Follow up in about 1 year (around 2/6/2025) for annual gyn exam.

## 2024-02-14 ENCOUNTER — PATIENT OUTREACH (OUTPATIENT)
Dept: ADMINISTRATIVE | Facility: HOSPITAL | Age: 49
End: 2024-02-14

## 2024-02-27 ENCOUNTER — OFFICE VISIT (OUTPATIENT)
Dept: FAMILY MEDICINE | Facility: CLINIC | Age: 49
End: 2024-02-27
Payer: COMMERCIAL

## 2024-02-27 VITALS
WEIGHT: 243 LBS | BODY MASS INDEX: 43.05 KG/M2 | SYSTOLIC BLOOD PRESSURE: 120 MMHG | HEART RATE: 83 BPM | OXYGEN SATURATION: 94 % | RESPIRATION RATE: 20 BRPM | HEIGHT: 63 IN | DIASTOLIC BLOOD PRESSURE: 80 MMHG | TEMPERATURE: 98 F

## 2024-02-27 DIAGNOSIS — K21.9 GASTROESOPHAGEAL REFLUX DISEASE WITHOUT ESOPHAGITIS: Chronic | ICD-10-CM

## 2024-02-27 DIAGNOSIS — R30.0 DYSURIA: ICD-10-CM

## 2024-02-27 DIAGNOSIS — N63.14 MASS OF LOWER INNER QUADRANT OF RIGHT BREAST: Primary | ICD-10-CM

## 2024-02-27 LAB
BILIRUB SERPL-MCNC: NORMAL MG/DL
BLOOD URINE, POC: NORMAL
COLOR, POC UA: YELLOW
GLUCOSE UR QL STRIP: NORMAL
KETONES UR QL STRIP: NORMAL
LEUKOCYTE ESTERASE URINE, POC: NORMAL
NITRITE, POC UA: NORMAL
PH, POC UA: 6.5
PROTEIN, POC: 100
SPECIFIC GRAVITY, POC UA: 1.03
UROBILINOGEN, POC UA: 1

## 2024-02-27 PROCEDURE — 99213 OFFICE O/P EST LOW 20 MIN: CPT | Mod: ,,, | Performed by: NURSE PRACTITIONER

## 2024-02-27 PROCEDURE — 3074F SYST BP LT 130 MM HG: CPT | Mod: CPTII,,, | Performed by: NURSE PRACTITIONER

## 2024-02-27 PROCEDURE — 4010F ACE/ARB THERAPY RXD/TAKEN: CPT | Mod: CPTII,,, | Performed by: NURSE PRACTITIONER

## 2024-02-27 PROCEDURE — 3079F DIAST BP 80-89 MM HG: CPT | Mod: CPTII,,, | Performed by: NURSE PRACTITIONER

## 2024-02-27 PROCEDURE — 3008F BODY MASS INDEX DOCD: CPT | Mod: CPTII,,, | Performed by: NURSE PRACTITIONER

## 2024-02-27 PROCEDURE — 1159F MED LIST DOCD IN RCRD: CPT | Mod: CPTII,,, | Performed by: NURSE PRACTITIONER

## 2024-02-27 PROCEDURE — 81003 URINALYSIS AUTO W/O SCOPE: CPT | Mod: QW,,, | Performed by: NURSE PRACTITIONER

## 2024-02-27 RX ORDER — DEXLANSOPRAZOLE 60 MG/1
60 CAPSULE, DELAYED RELEASE ORAL DAILY
Qty: 90 CAPSULE | Refills: 1 | Status: SHIPPED | OUTPATIENT
Start: 2024-02-27

## 2024-02-27 RX ORDER — FUROSEMIDE 20 MG/1
TABLET ORAL
COMMUNITY
End: 2024-02-27 | Stop reason: SDUPTHER

## 2024-02-27 NOTE — PROGRESS NOTES
SUNI Cruz   [facility]  [address]   [place] [address]     PATIENT NAME: Glendy Engle  : 1975  DATE: 24  MRN: [id number]      Billing Provider: SUNI Cruz  Level of Service:   Patient PCP Information       Provider PCP Type    SUNI Cruz General            Reason for Visit / Chief Complaint: Flank Pain (Right flank and lower abd pain x a few days) and Dysuria (X a few days)         History of Present Illness / Problem Focused Workflow     Glendy Engle is a 49 y.o. female presents to the clinic  with   lower abdominal and right abdominal pain for the past week, worsened over the past 3-4 days.  She it keeping her fluid intake up.   She has dysuria but no frequency.   She has noted if she overeats she has more problems with pain.  She notes her pain did get worse after eating grilled steak.  She  has not had any increased heartburn/indigestion and remains on Dexilant with good results; however, if she misses a dose her reflux will flare up.    She has ct scan of liver scheduled next week.  Bowels are moving well recently with addition of stool softener with no blood noted.   She has noted some firmness of her right breast medially  that is tender; she has had a breast reduction in past and thought might be related to scar tissue.   Her mammogram was done in 2024 and was normal.       Review of Systems     Review of Systems   Gastrointestinal:         Tender to palp lower abdomen and right upper quadrant abdomen     Genitourinary:         Tender to palp lower abdomen and right upper quadrant abdomen     Integumentary:  Positive for breast mass (right breast tender and firm to palp medial aspect with no erythema).   Breast: Positive for mass (right breast tender and firm to palp medial aspect with no erythema).       Medical / Social / Family History     Past Medical History:   Diagnosis Date    Abnormal pulmonary function test 2019    Abnormal radiograph      Asthma     GERD (gastroesophageal reflux disease)     Hypertension     SHERLYN on CPAP     Thyroid nodule        Past Surgical History:   Procedure Laterality Date    CHOLECYSTECTOMY      COLONOSCOPY      ESOPHAGOGASTRODUODENOSCOPY      HYSTERECTOMY      LEG SURGERY      vein surgery on right leg    REDUCTION OF BOTH BREASTS      VASCULAR SURGERY      Rt GSV Laser Ablation Phlebectomy: Dr. Sergey Esteves  2017       Social History  Ms.  reports that she has never smoked. She has never been exposed to tobacco smoke. She has never used smokeless tobacco. She reports that she does not currently use alcohol. She reports that she does not use drugs.    Family History  Ms.'s family history includes Cancer in her father and maternal aunt; Diabetes in her maternal grandmother and paternal grandmother; Heart disease in her mother and paternal grandfather; Heart failure in her mother; Hypertension in her mother.    Medications and Allergies     Medications  Outpatient Medications Marked as Taking for the 2/27/24 encounter (Office Visit) with Kelli Paez FNP   Medication Sig Dispense Refill    albuterol (PROVENTIL/VENTOLIN HFA) 90 mcg/actuation inhaler Inhale 2 puffs into the lungs every 6 (six) hours as needed for Wheezing. Rescue 18 g 5    azithromycin (ZITHROMAX Z-ROSANNE) 250 MG tablet Take 2 tabs today and then one daily til gone 6 tablet 0    budesonide-formoterol 160-4.5 mcg (SYMBICORT) 160-4.5 mcg/actuation HFAA Inhale 2 puffs into the lungs every 12 (twelve) hours. Controller 10.2 g 5    cetirizine (ZYRTEC) 10 MG tablet Take 1 tablet (10 mg total) by mouth once daily. 90 tablet 1    cyclobenzaprine (FLEXERIL) 10 MG tablet Take 10 mg by mouth 3 (three) times daily as needed for Muscle spasms.      dexlansoprazole (DEXILANT) 60 mg capsule Take by mouth.      fexofenadine (ALLEGRA) 180 MG tablet Take 1 tablet (180 mg total) by mouth once daily. 90 tablet 3    HYDROcodone-acetaminophen (NORCO)  mg per tablet Take 1 tablet by  "mouth 2 (two) times daily.      lisinopriL (PRINIVIL,ZESTRIL) 20 MG tablet Take 1 tablet (20 mg total) by mouth once daily. 90 tablet 3    metFORMIN (GLUCOPHAGE) 500 MG tablet Take 1 tablet (500 mg total) by mouth 2 (two) times daily with meals. 180 tablet 1    paroxetine (PAXIL) 20 MG tablet Take 1 tablet (20 mg total) by mouth every evening. 90 tablet 3    simvastatin (ZOCOR) 40 MG tablet Take 1 tablet (40 mg total) by mouth every evening. 90 tablet 3    [DISCONTINUED] furosemide (LASIX) 20 MG tablet richar 1-2 tabs daily as needed for swelling.  Take potassium with Lasix/tm 60 tablet 11       Allergies  Review of patient's allergies indicates:   Allergen Reactions    Bactrim [sulfamethoxazole-trimethoprim] Hives       Physical Examination     Vitals:    02/27/24 1315 02/27/24 1317   BP: (!) 143/83 (!) 146/86   Pulse: 83    Resp: 20    Temp: 98.3 °F (36.8 °C)    TempSrc: Oral    SpO2: (!) 94%    Weight: 110.2 kg (243 lb)    Height: 5' 3" (1.6 m)       Physical Exam  Constitutional:       Appearance: Normal appearance.   Cardiovascular:      Rate and Rhythm: Normal rate and regular rhythm.   Pulmonary:      Effort: Pulmonary effort is normal.      Breath sounds: Normal breath sounds.   Chest:      Comments: Tender  to palp right medial breast with firmness noted.  Abdominal:      Comments: Tender to palp right upper quadrant and suprapubic area only. Urinalysis wnl.    Skin:     General: Skin is warm and dry.   Neurological:      Mental Status: She is alert and oriented to person, place, and time.          Assessment and Plan (including Health Maintenance)     :    Plan:  urinalysis wnl.  Will get ultrasound of right breast at [facility].   Follow up if abdominal pain persists.        Health Maintenance Due   Topic Date Due    Foot Exam  Never done    Eye Exam  Never done    HIV Screening  Never done       Problem List Items Addressed This Visit    None  Visit Diagnoses       Dysuria    -  Primary      "   .  Dysuria  -     POCT URINALYSIS W/O SCOPE       Health Maintenance Topics with due status: Not Due       Topic Last Completion Date    TETANUS VACCINE 05/30/2017    Colorectal Cancer Screening 09/14/2023    Diabetes Urine Screening 11/15/2023    Lipid Panel 11/15/2023    Hemoglobin A1c 11/15/2023    Mammogram 01/17/2024    Low Dose Statin 02/07/2024       Procedures     Future Appointments   Date Time Provider Department Center   11/19/2024  9:30 AM Kelli Paez FNP [facility]   12/17/2024 10:00 AM [facility]    12/17/2024 10:15 AM [facility]        No follow-ups on file.     Signature:  SUNI Cruz    Date of encounter: 2/27/24

## 2024-02-29 ENCOUNTER — TELEPHONE (OUTPATIENT)
Dept: FAMILY MEDICINE | Facility: CLINIC | Age: 49
End: 2024-02-29
Payer: COMMERCIAL

## 2024-02-29 DIAGNOSIS — N63.14 MASS OF LOWER INNER QUADRANT OF RIGHT BREAST: Primary | ICD-10-CM

## 2024-02-29 NOTE — TELEPHONE ENCOUNTER
----- Message from Dinah Kaur sent at 2/28/2024 11:07 AM CST -----  Regarding: referral  [facility] is requesting an order for diagnostic mammo right breast in addition to the US order that was already placed.

## 2024-04-08 ENCOUNTER — PATIENT OUTREACH (OUTPATIENT)
Dept: ADMINISTRATIVE | Facility: HOSPITAL | Age: 49
End: 2024-04-08

## 2024-04-08 NOTE — PROGRESS NOTES
Uploaded mammogram and ultrasound from [facility]. Pt is to start back having bilateral screening starting 1/2025

## 2024-04-29 ENCOUNTER — TELEPHONE (OUTPATIENT)
Dept: FAMILY MEDICINE | Facility: CLINIC | Age: 49
End: 2024-04-29
Payer: COMMERCIAL

## 2024-04-29 NOTE — TELEPHONE ENCOUNTER
Unable to reach pt at [phone] but left a message for a return call to discuss possible medication changes per insurance request. Due to cost pt's insurance wants to change from Dexlansoprazole to omeprazole or Pantoprazole.

## 2024-04-29 NOTE — TELEPHONE ENCOUNTER
In reference to pt's insurance requesting that pt change from Dexilant to either omeprazole or Pantoprazole, Pt returned call and said that this would be fine with her. I did instruct pt to finish the pills that she has first. Pt vo understanding.

## 2024-05-22 ENCOUNTER — OFFICE VISIT (OUTPATIENT)
Dept: FAMILY MEDICINE | Facility: CLINIC | Age: 49
End: 2024-05-22
Payer: COMMERCIAL

## 2024-05-22 DIAGNOSIS — J45.30 MILD PERSISTENT ASTHMA WITHOUT COMPLICATION: Chronic | ICD-10-CM

## 2024-05-22 DIAGNOSIS — J06.9 UPPER RESPIRATORY TRACT INFECTION, UNSPECIFIED TYPE: Primary | ICD-10-CM

## 2024-05-22 PROCEDURE — 4010F ACE/ARB THERAPY RXD/TAKEN: CPT | Mod: CPTII,,, | Performed by: NURSE PRACTITIONER

## 2024-05-22 PROCEDURE — 99213 OFFICE O/P EST LOW 20 MIN: CPT | Mod: ,,, | Performed by: NURSE PRACTITIONER

## 2024-05-22 PROCEDURE — 1159F MED LIST DOCD IN RCRD: CPT | Mod: CPTII,,, | Performed by: NURSE PRACTITIONER

## 2024-05-22 RX ORDER — GUAIFENESIN 1200 MG/1
1 TABLET, EXTENDED RELEASE ORAL 2 TIMES DAILY
Qty: 20 TABLET | Refills: 0 | Status: SHIPPED | OUTPATIENT
Start: 2024-05-22 | End: 2024-06-01

## 2024-05-22 RX ORDER — ALBUTEROL SULFATE 90 UG/1
2 AEROSOL, METERED RESPIRATORY (INHALATION) EVERY 6 HOURS PRN
Qty: 18 G | Refills: 5 | Status: SHIPPED | OUTPATIENT
Start: 2024-05-22

## 2024-05-22 RX ORDER — AZITHROMYCIN 250 MG/1
TABLET, FILM COATED ORAL
Qty: 6 TABLET | Refills: 0 | Status: SHIPPED | OUTPATIENT
Start: 2024-05-22

## 2024-05-22 RX ORDER — BUDESONIDE AND FORMOTEROL FUMARATE DIHYDRATE 160; 4.5 UG/1; UG/1
2 AEROSOL RESPIRATORY (INHALATION) EVERY 12 HOURS
Qty: 10.2 G | Refills: 5 | Status: SHIPPED | OUTPATIENT
Start: 2024-05-22

## 2024-05-22 NOTE — PROGRESS NOTES
SUNI Cruz   [facility]  [address]   [place] [address]     PATIENT NAME: Glendy Engle  : 1975  DATE: 24  MRN: [id number]      Billing Provider: SUNI Cruz  Level of Service:   Patient PCP Information       Provider PCP Type    SUNI Cruz General            Reason for Visit / Chief Complaint: Medication Refill (Needing refills on inhalers)         History of Present Illness / Problem Focused Workflow     Glendy Engle is a 49 y.o. female presents to the clinic  for refill of her symbicort taht she takes twice daily for asthma and has not needed supplementary albuterol since starting.  Needs new Rx.  She developed  chest congestion, hoarseness/sore throat, dry cough primarily and  minimal head congestion. No fever but has had some chills.  She is taking Dayquil/Nyquil  and not really improving much.       Review of Systems     Review of Systems   Constitutional:  Positive for chills. Negative for fatigue and fever.   HENT:  Positive for nasal congestion, sore throat and voice change.    Respiratory:  Positive for cough. Negative for chest tightness, shortness of breath and wheezing.    Cardiovascular:  Negative for chest pain, palpitations and leg swelling.   Gastrointestinal:  Positive for reflux. Negative for nausea and vomiting.   Neurological:  Negative for weakness and memory loss.   Psychiatric/Behavioral:  Negative for confusion and sleep disturbance.         Medical / Social / Family History     Past Medical History:   Diagnosis Date    Abnormal pulmonary function test 2019    Abnormal radiograph     Asthma     GERD (gastroesophageal reflux disease)     Hypertension     SHERLYN on CPAP     Thyroid nodule        Past Surgical History:   Procedure Laterality Date    CHOLECYSTECTOMY      COLONOSCOPY      ESOPHAGOGASTRODUODENOSCOPY      HYSTERECTOMY      LEG SURGERY      vein surgery on right leg    REDUCTION OF BOTH BREASTS      VASCULAR SURGERY      Rt GSV  Laser Ablation Phlebectomy: Dr. Sergey Esteves  2017       Social History  Ms.  reports that she has never smoked. She has never been exposed to tobacco smoke. She has never used smokeless tobacco. She reports that she does not currently use alcohol. She reports that she does not use drugs.    Family History  Ms.'s family history includes Cancer in her father and maternal aunt; Diabetes in her maternal grandmother and paternal grandmother; Heart disease in her mother and paternal grandfather; Heart failure in her mother; Hypertension in her mother.    Medications and Allergies     Medications  Outpatient Medications Marked as Taking for the 5/22/24 encounter (Office Visit) with Kelli Paez FNP   Medication Sig Dispense Refill    albuterol (PROVENTIL/VENTOLIN HFA) 90 mcg/actuation inhaler Inhale 2 puffs into the lungs every 6 (six) hours as needed for Wheezing. Rescue 18 g 5    budesonide-formoterol 160-4.5 mcg (SYMBICORT) 160-4.5 mcg/actuation HFAA Inhale 2 puffs into the lungs every 12 (twelve) hours. Controller 10.2 g 5    cetirizine (ZYRTEC) 10 MG tablet Take 1 tablet (10 mg total) by mouth once daily. 90 tablet 1    cyclobenzaprine (FLEXERIL) 10 MG tablet Take 10 mg by mouth 3 (three) times daily as needed for Muscle spasms.      dexlansoprazole (DEXILANT) 60 mg capsule Take 1 capsule (60 mg total) by mouth Daily. 90 capsule 1    HYDROcodone-acetaminophen (NORCO)  mg per tablet Take 1 tablet by mouth 2 (two) times daily.      lisinopriL (PRINIVIL,ZESTRIL) 20 MG tablet Take 1 tablet (20 mg total) by mouth once daily. 90 tablet 3    metFORMIN (GLUCOPHAGE) 500 MG tablet Take 1 tablet (500 mg total) by mouth 2 (two) times daily with meals. 180 tablet 1    paroxetine (PAXIL) 20 MG tablet Take 1 tablet (20 mg total) by mouth every evening. 90 tablet 3    simvastatin (ZOCOR) 40 MG tablet Take 1 tablet (40 mg total) by mouth every evening. 90 tablet 3       Allergies  Review of patient's allergies indicates:   Allergen  Reactions    Bactrim [sulfamethoxazole-trimethoprim] Hives       Physical Examination   There were no vitals filed for this visit.   Physical Exam  Constitutional:       Appearance: She is obese.   HENT:      Right Ear: Tympanic membrane, ear canal and external ear normal.      Left Ear: Tympanic membrane, ear canal and external ear normal.      Nose: Congestion present.      Mouth/Throat:      Pharynx: No posterior oropharyngeal erythema.   Cardiovascular:      Rate and Rhythm: Normal rate and regular rhythm.      Pulses: Normal pulses.      Heart sounds: Normal heart sounds.   Pulmonary:      Effort: Pulmonary effort is normal.      Breath sounds: Normal breath sounds.   Lymphadenopathy:      Cervical: No cervical adenopathy.   Skin:     General: Skin is warm and dry.   Neurological:      Mental Status: She is alert and oriented to person, place, and time.          Assessment and Plan (including Health Maintenance)     :    Plan:   will treat with  zpak#1 and dayquil/nyquil for contgestion/cough.  Refill  Symbicort to use bid,    Wear mask in  egg  house to reduce  dust exposure.  Drink lots of liquids, throat lozenges        Health Maintenance Due   Topic Date Due    Foot Exam  Never done    Eye Exam  Never done    HIV Screening  Never done    Hemoglobin A1c  05/15/2024       Problem List Items Addressed This Visit          Pulmonary    Mild persistent asthma without complication (Chronic)   .  Mild persistent asthma without complication       Health Maintenance Topics with due status: Not Due       Topic Last Completion Date    TETANUS VACCINE 05/30/2017    Colorectal Cancer Screening 09/14/2023    Diabetes Urine Screening 11/15/2023    Lipid Panel 11/15/2023    Low Dose Statin 02/27/2024    Mammogram 03/26/2024       Procedures     Future Appointments   Date Time Provider Department Center   11/19/2024  9:30 AM Kelli Paez FNP [facility]   12/17/2024 10:00 AM [facility]    12/17/2024 10:15 AM [facility]        No follow-ups on file.     Signature:  SUNI Cruz    Date of encounter: 5/22/24

## 2024-05-23 RX ORDER — CETIRIZINE HYDROCHLORIDE 10 MG/1
10 TABLET ORAL DAILY
Qty: 90 TABLET | Refills: 1 | Status: SHIPPED | OUTPATIENT
Start: 2024-05-23 | End: 2025-05-23

## 2024-05-23 NOTE — TELEPHONE ENCOUNTER
Last OV - 5/22/24  Last labs - 11/15/23    Result note: Notify Glendy that her urine test was normal, no sign of infection. Chem panel shows slightly  low potassium--needs some foods high in potassium such as banana every day.   Liver test still just a little high.  Blood count with no anemia, A1c non diabetic at 5.4. cholesterol is normal./tm

## 2024-05-23 NOTE — TELEPHONE ENCOUNTER
----- Message from Dinah Kaur sent at 5/23/2024  9:32 AM CDT -----  Regarding: refill  Pt needs a refill on cetirizine sent to [facility] in [place].

## 2024-07-05 DIAGNOSIS — F41.1 GENERALIZED ANXIETY DISORDER: Chronic | ICD-10-CM

## 2024-07-05 RX ORDER — PAROXETINE HYDROCHLORIDE 20 MG/1
20 TABLET, FILM COATED ORAL NIGHTLY
Qty: 90 TABLET | Refills: 0 | Status: SHIPPED | OUTPATIENT
Start: 2024-07-05

## 2024-08-08 ENCOUNTER — PATIENT OUTREACH (OUTPATIENT)
Facility: HOSPITAL | Age: 49
End: 2024-08-08
Payer: COMMERCIAL

## 2024-08-08 ENCOUNTER — OFFICE VISIT (OUTPATIENT)
Dept: FAMILY MEDICINE | Facility: CLINIC | Age: 49
End: 2024-08-08
Payer: COMMERCIAL

## 2024-08-08 VITALS
WEIGHT: 247 LBS | BODY MASS INDEX: 43.77 KG/M2 | OXYGEN SATURATION: 98 % | SYSTOLIC BLOOD PRESSURE: 138 MMHG | DIASTOLIC BLOOD PRESSURE: 83 MMHG | HEIGHT: 63 IN | TEMPERATURE: 98 F | RESPIRATION RATE: 20 BRPM | HEART RATE: 73 BPM

## 2024-08-08 DIAGNOSIS — R73.03 PREDIABETES: Chronic | ICD-10-CM

## 2024-08-08 DIAGNOSIS — E66.01 MORBID OBESITY: Chronic | ICD-10-CM

## 2024-08-08 DIAGNOSIS — K76.0 FATTY LIVER DISEASE, NONALCOHOLIC: Chronic | ICD-10-CM

## 2024-08-08 DIAGNOSIS — G47.33 OSA ON CPAP: Chronic | ICD-10-CM

## 2024-08-08 DIAGNOSIS — I10 PRIMARY HYPERTENSION: Primary | Chronic | ICD-10-CM

## 2024-08-08 PROBLEM — F41.1 GENERALIZED ANXIETY DISORDER: Status: RESOLVED | Noted: 2023-02-15 | Resolved: 2024-08-08

## 2024-08-08 PROBLEM — J45.909 ASTHMA: Status: ACTIVE | Noted: 2024-08-08

## 2024-08-08 PROBLEM — Z20.822 EXPOSURE TO SEVERE ACUTE RESPIRATORY SYNDROME CORONAVIRUS 2 (SARS-COV-2): Status: RESOLVED | Noted: 2021-04-02 | Resolved: 2024-08-08

## 2024-08-08 PROBLEM — M54.16 LUMBAR BACK PAIN WITH RADICULOPATHY AFFECTING RIGHT LOWER EXTREMITY: Status: RESOLVED | Noted: 2022-05-11 | Resolved: 2024-08-08

## 2024-08-08 LAB
ALBUMIN SERPL BCP-MCNC: 3.9 G/DL (ref 3.5–5)
ALBUMIN/GLOB SERPL: 1 {RATIO}
ALP SERPL-CCNC: 126 U/L (ref 39–100)
ALT SERPL W P-5'-P-CCNC: 49 U/L (ref 13–56)
ANION GAP SERPL CALCULATED.3IONS-SCNC: 10 MMOL/L (ref 7–16)
AST SERPL W P-5'-P-CCNC: 48 U/L (ref 15–37)
BASOPHILS # BLD AUTO: 0.07 K/UL (ref 0–0.2)
BASOPHILS NFR BLD AUTO: 0.8 % (ref 0–1)
BILIRUB SERPL-MCNC: 0.7 MG/DL (ref ?–1.2)
BUN SERPL-MCNC: 8 MG/DL (ref 7–18)
BUN/CREAT SERPL: 11 (ref 6–20)
CALCIUM SERPL-MCNC: 9.4 MG/DL (ref 8.5–10.1)
CHLORIDE SERPL-SCNC: 104 MMOL/L (ref 98–107)
CHOLEST SERPL-MCNC: 142 MG/DL (ref 0–200)
CHOLEST/HDLC SERPL: 2.8 {RATIO}
CO2 SERPL-SCNC: 31 MMOL/L (ref 21–32)
CREAT SERPL-MCNC: 0.7 MG/DL (ref 0.55–1.02)
CREAT UR-MCNC: 197 MG/DL (ref 28–219)
DIFFERENTIAL METHOD BLD: ABNORMAL
EGFR (NO RACE VARIABLE) (RUSH/TITUS): 108 ML/MIN/1.73M2
EOSINOPHIL # BLD AUTO: 0.21 K/UL (ref 0–0.5)
EOSINOPHIL NFR BLD AUTO: 2.5 % (ref 1–4)
ERYTHROCYTE [DISTWIDTH] IN BLOOD BY AUTOMATED COUNT: 12.6 % (ref 11.5–14.5)
GLOBULIN SER-MCNC: 3.9 G/DL (ref 2–4)
GLUCOSE SERPL-MCNC: 86 MG/DL (ref 74–106)
HCT VFR BLD AUTO: 44.3 % (ref 38–47)
HDLC SERPL-MCNC: 51 MG/DL (ref 40–60)
HGB BLD-MCNC: 14.9 G/DL (ref 12–16)
IMM GRANULOCYTES # BLD AUTO: 0.02 K/UL (ref 0–0.04)
IMM GRANULOCYTES NFR BLD: 0.2 % (ref 0–0.4)
LDLC SERPL CALC-MCNC: 59 MG/DL
LDLC/HDLC SERPL: 1.2 {RATIO}
LYMPHOCYTES # BLD AUTO: 2.41 K/UL (ref 1–4.8)
LYMPHOCYTES NFR BLD AUTO: 28.9 % (ref 27–41)
MCH RBC QN AUTO: 31.3 PG (ref 27–31)
MCHC RBC AUTO-ENTMCNC: 33.6 G/DL (ref 32–36)
MCV RBC AUTO: 93.1 FL (ref 80–96)
MICROALBUMIN UR-MCNC: 6.6 MG/DL (ref 0–2.8)
MICROALBUMIN/CREAT RATIO PNL UR: 33.5 MG/G (ref 0–30)
MONOCYTES # BLD AUTO: 0.78 K/UL (ref 0–0.8)
MONOCYTES NFR BLD AUTO: 9.4 % (ref 2–6)
MPC BLD CALC-MCNC: 12.4 FL (ref 9.4–12.4)
NEUTROPHILS # BLD AUTO: 4.84 K/UL (ref 1.8–7.7)
NEUTROPHILS NFR BLD AUTO: 58.2 % (ref 53–65)
NONHDLC SERPL-MCNC: 91 MG/DL
NRBC # BLD AUTO: 0 X10E3/UL
NRBC, AUTO (.00): 0 %
PLATELET # BLD AUTO: 147 K/UL (ref 150–400)
POTASSIUM SERPL-SCNC: 3.3 MMOL/L (ref 3.5–5.1)
PROT SERPL-MCNC: 7.8 G/DL (ref 6.4–8.2)
RBC # BLD AUTO: 4.76 M/UL (ref 4.2–5.4)
SODIUM SERPL-SCNC: 142 MMOL/L (ref 136–145)
TRIGL SERPL-MCNC: 159 MG/DL (ref 35–150)
VLDLC SERPL-MCNC: 32 MG/DL
WBC # BLD AUTO: 8.33 K/UL (ref 4.5–11)

## 2024-08-08 PROCEDURE — 82043 UR ALBUMIN QUANTITATIVE: CPT | Mod: ,,, | Performed by: CLINICAL MEDICAL LABORATORY

## 2024-08-08 PROCEDURE — 82570 ASSAY OF URINE CREATININE: CPT | Mod: ,,, | Performed by: CLINICAL MEDICAL LABORATORY

## 2024-08-08 PROCEDURE — 80061 LIPID PANEL: CPT | Mod: ,,, | Performed by: CLINICAL MEDICAL LABORATORY

## 2024-08-08 PROCEDURE — 80053 COMPREHEN METABOLIC PANEL: CPT | Mod: ,,, | Performed by: CLINICAL MEDICAL LABORATORY

## 2024-08-08 PROCEDURE — 83036 HEMOGLOBIN GLYCOSYLATED A1C: CPT | Mod: ,,, | Performed by: CLINICAL MEDICAL LABORATORY

## 2024-08-08 PROCEDURE — 1159F MED LIST DOCD IN RCRD: CPT | Mod: CPTII,,, | Performed by: NURSE PRACTITIONER

## 2024-08-08 PROCEDURE — 3079F DIAST BP 80-89 MM HG: CPT | Mod: CPTII,,, | Performed by: NURSE PRACTITIONER

## 2024-08-08 PROCEDURE — 85025 COMPLETE CBC W/AUTO DIFF WBC: CPT | Mod: ,,, | Performed by: CLINICAL MEDICAL LABORATORY

## 2024-08-08 PROCEDURE — 4010F ACE/ARB THERAPY RXD/TAKEN: CPT | Mod: CPTII,,, | Performed by: NURSE PRACTITIONER

## 2024-08-08 PROCEDURE — 3008F BODY MASS INDEX DOCD: CPT | Mod: CPTII,,, | Performed by: NURSE PRACTITIONER

## 2024-08-08 PROCEDURE — 3075F SYST BP GE 130 - 139MM HG: CPT | Mod: CPTII,,, | Performed by: NURSE PRACTITIONER

## 2024-08-08 PROCEDURE — 99214 OFFICE O/P EST MOD 30 MIN: CPT | Mod: ,,, | Performed by: NURSE PRACTITIONER

## 2024-08-08 RX ORDER — FAMOTIDINE 20 MG/1
20 TABLET, FILM COATED ORAL NIGHTLY
Qty: 30 TABLET | Refills: 5 | Status: SHIPPED | OUTPATIENT
Start: 2024-08-08 | End: 2025-08-08

## 2024-08-08 RX ORDER — PANTOPRAZOLE SODIUM 40 MG/1
40 TABLET, DELAYED RELEASE ORAL DAILY
Qty: 30 TABLET | Refills: 3 | Status: SHIPPED | OUTPATIENT
Start: 2024-08-08 | End: 2025-08-08

## 2024-08-09 DIAGNOSIS — R73.03 PREDIABETES: ICD-10-CM

## 2024-08-09 DIAGNOSIS — I10 PRIMARY HYPERTENSION: Chronic | ICD-10-CM

## 2024-08-09 DIAGNOSIS — E66.01 MORBID OBESITY: Primary | ICD-10-CM

## 2024-08-09 DIAGNOSIS — G47.33 OSA ON CPAP: Chronic | ICD-10-CM

## 2024-08-09 LAB
EST. AVERAGE GLUCOSE BLD GHB EST-MCNC: 108 MG/DL
HBA1C MFR BLD HPLC: 5.4 % (ref 4.5–6.6)

## 2024-08-09 RX ORDER — SEMAGLUTIDE 0.25 MG/.5ML
0.25 INJECTION, SOLUTION SUBCUTANEOUS
Qty: 2 ML | Refills: 0 | Status: SHIPPED | OUTPATIENT
Start: 2024-08-09

## 2024-09-30 RX ORDER — SIMVASTATIN 40 MG/1
40 TABLET, FILM COATED ORAL NIGHTLY
Qty: 90 TABLET | Refills: 3 | Status: SHIPPED | OUTPATIENT
Start: 2024-09-30

## 2024-09-30 NOTE — TELEPHONE ENCOUNTER
Pt called requesting a refill on her Zocor.     Last OV - 8/8/24  Last labs - 8/8/24    Result note: Notify Glendy that her urine test  was stable.. Potassium is a little low and needs to eat foods with potassium such as baked potatoes with skin on, greens, bananas, kiwi, strawberries.  Liver tests are down just a little.  Cholesterol looks good. Blood count is normal and A1c is still prediabetes at 5.4.  If she would like I will send in meds for weight loss as we discussed and see if insurance will pay.  If she is able to get them tell her to come in so we can show her how to administer/tm

## 2024-10-09 ENCOUNTER — OFFICE VISIT (OUTPATIENT)
Dept: FAMILY MEDICINE | Facility: CLINIC | Age: 49
End: 2024-10-09
Payer: COMMERCIAL

## 2024-10-09 VITALS
HEIGHT: 63 IN | HEART RATE: 67 BPM | TEMPERATURE: 98 F | OXYGEN SATURATION: 99 % | RESPIRATION RATE: 20 BRPM | BODY MASS INDEX: 45.18 KG/M2 | SYSTOLIC BLOOD PRESSURE: 130 MMHG | DIASTOLIC BLOOD PRESSURE: 90 MMHG | WEIGHT: 255 LBS

## 2024-10-09 DIAGNOSIS — I73.9 PVD (PERIPHERAL VASCULAR DISEASE): Chronic | ICD-10-CM

## 2024-10-09 DIAGNOSIS — G47.33 OSA ON CPAP: Chronic | ICD-10-CM

## 2024-10-09 DIAGNOSIS — K76.0 STEATOSIS OF LIVER: Chronic | ICD-10-CM

## 2024-10-09 DIAGNOSIS — I10 PRIMARY HYPERTENSION: Primary | Chronic | ICD-10-CM

## 2024-10-09 DIAGNOSIS — E11.9 DIABETES MELLITUS WITHOUT COMPLICATION: Chronic | ICD-10-CM

## 2024-10-09 PROCEDURE — 3066F NEPHROPATHY DOC TX: CPT | Mod: CPTII,,, | Performed by: NURSE PRACTITIONER

## 2024-10-09 PROCEDURE — 3080F DIAST BP >= 90 MM HG: CPT | Mod: CPTII,,, | Performed by: NURSE PRACTITIONER

## 2024-10-09 PROCEDURE — 99214 OFFICE O/P EST MOD 30 MIN: CPT | Mod: ,,, | Performed by: NURSE PRACTITIONER

## 2024-10-09 PROCEDURE — 3075F SYST BP GE 130 - 139MM HG: CPT | Mod: CPTII,,, | Performed by: NURSE PRACTITIONER

## 2024-10-09 PROCEDURE — 1159F MED LIST DOCD IN RCRD: CPT | Mod: CPTII,,, | Performed by: NURSE PRACTITIONER

## 2024-10-09 PROCEDURE — 3008F BODY MASS INDEX DOCD: CPT | Mod: CPTII,,, | Performed by: NURSE PRACTITIONER

## 2024-10-09 PROCEDURE — 3060F POS MICROALBUMINURIA REV: CPT | Mod: CPTII,,, | Performed by: NURSE PRACTITIONER

## 2024-10-09 PROCEDURE — 3044F HG A1C LEVEL LT 7.0%: CPT | Mod: CPTII,,, | Performed by: NURSE PRACTITIONER

## 2024-10-09 RX ORDER — SEMAGLUTIDE 0.25 MG/.5ML
0.5 INJECTION, SOLUTION SUBCUTANEOUS WEEKLY
COMMUNITY
Start: 2024-08-09 | End: 2024-10-09

## 2024-10-09 RX ORDER — LISINOPRIL 20 MG/1
20 TABLET ORAL DAILY
Qty: 90 TABLET | Refills: 3 | Status: SHIPPED | OUTPATIENT
Start: 2024-10-09 | End: 2025-10-09

## 2024-10-19 LAB — HBA1C MFR BLD: 5.5 % (ref 4–6)

## 2024-10-22 LAB
LEFT EYE DM RETINOPATHY: NEGATIVE
RIGHT EYE DM RETINOPATHY: NEGATIVE

## 2024-10-28 DIAGNOSIS — F41.1 GENERALIZED ANXIETY DISORDER: Chronic | ICD-10-CM

## 2024-10-28 RX ORDER — PAROXETINE HYDROCHLORIDE 20 MG/1
20 TABLET, FILM COATED ORAL NIGHTLY
Qty: 90 TABLET | Refills: 1 | Status: SHIPPED | OUTPATIENT
Start: 2024-10-28

## 2024-10-30 ENCOUNTER — PATIENT OUTREACH (OUTPATIENT)
Facility: HOSPITAL | Age: 49
End: 2024-10-30
Payer: COMMERCIAL

## 2024-11-05 ENCOUNTER — OFFICE VISIT (OUTPATIENT)
Dept: FAMILY MEDICINE | Facility: CLINIC | Age: 49
End: 2024-11-05
Payer: COMMERCIAL

## 2024-11-05 VITALS
SYSTOLIC BLOOD PRESSURE: 150 MMHG | WEIGHT: 254.81 LBS | BODY MASS INDEX: 45.15 KG/M2 | RESPIRATION RATE: 18 BRPM | HEART RATE: 78 BPM | TEMPERATURE: 98 F | DIASTOLIC BLOOD PRESSURE: 94 MMHG | OXYGEN SATURATION: 97 % | HEIGHT: 63 IN

## 2024-11-05 DIAGNOSIS — R73.03 PREDIABETES: Chronic | ICD-10-CM

## 2024-11-05 DIAGNOSIS — E66.01 MORBID OBESITY: Primary | Chronic | ICD-10-CM

## 2024-11-05 DIAGNOSIS — Z23 NEED FOR INFLUENZA VACCINATION: ICD-10-CM

## 2024-11-05 DIAGNOSIS — Z23 NEED FOR COVID-19 VACCINE: ICD-10-CM

## 2024-11-05 DIAGNOSIS — I10 PRIMARY HYPERTENSION: Chronic | ICD-10-CM

## 2024-11-05 DIAGNOSIS — K75.81 METABOLIC DYSFUNCTION-ASSOCIATED STEATOHEPATITIS (MASH): Chronic | ICD-10-CM

## 2024-11-05 PROBLEM — R79.89 ABNORMAL LFTS: Status: RESOLVED | Noted: 2024-11-05 | Resolved: 2024-11-05

## 2024-11-05 PROBLEM — F41.9 ANXIETY: Status: RESOLVED | Noted: 2024-10-07 | Resolved: 2024-11-05

## 2024-11-05 PROBLEM — N39.46 MIXED STRESS AND URGE URINARY INCONTINENCE: Status: RESOLVED | Noted: 2022-07-27 | Resolved: 2024-11-05

## 2024-11-05 PROCEDURE — 90656 IIV3 VACC NO PRSV 0.5 ML IM: CPT | Mod: ,,, | Performed by: NURSE PRACTITIONER

## 2024-11-05 PROCEDURE — 3060F POS MICROALBUMINURIA REV: CPT | Mod: CPTII,,, | Performed by: NURSE PRACTITIONER

## 2024-11-05 PROCEDURE — 3080F DIAST BP >= 90 MM HG: CPT | Mod: CPTII,,, | Performed by: NURSE PRACTITIONER

## 2024-11-05 PROCEDURE — 91322 SARSCOV2 VAC 50 MCG/0.5ML IM: CPT | Mod: ,,, | Performed by: NURSE PRACTITIONER

## 2024-11-05 PROCEDURE — 1159F MED LIST DOCD IN RCRD: CPT | Mod: CPTII,,, | Performed by: NURSE PRACTITIONER

## 2024-11-05 PROCEDURE — 90471 IMMUNIZATION ADMIN: CPT | Mod: ,,, | Performed by: NURSE PRACTITIONER

## 2024-11-05 PROCEDURE — 3044F HG A1C LEVEL LT 7.0%: CPT | Mod: CPTII,,, | Performed by: NURSE PRACTITIONER

## 2024-11-05 PROCEDURE — 3008F BODY MASS INDEX DOCD: CPT | Mod: CPTII,,, | Performed by: NURSE PRACTITIONER

## 2024-11-05 PROCEDURE — 3077F SYST BP >= 140 MM HG: CPT | Mod: CPTII,,, | Performed by: NURSE PRACTITIONER

## 2024-11-05 PROCEDURE — 3066F NEPHROPATHY DOC TX: CPT | Mod: CPTII,,, | Performed by: NURSE PRACTITIONER

## 2024-11-05 PROCEDURE — 99214 OFFICE O/P EST MOD 30 MIN: CPT | Mod: 25,,, | Performed by: NURSE PRACTITIONER

## 2024-11-05 PROCEDURE — 4010F ACE/ARB THERAPY RXD/TAKEN: CPT | Mod: CPTII,,, | Performed by: NURSE PRACTITIONER

## 2024-11-05 PROCEDURE — 90480 ADMN SARSCOV2 VAC 1/ONLY CMP: CPT | Mod: ,,, | Performed by: NURSE PRACTITIONER

## 2024-11-05 RX ORDER — MINERAL OIL
180 ENEMA (ML) RECTAL DAILY
COMMUNITY

## 2024-11-05 RX ORDER — SEMAGLUTIDE 0.25 MG/.5ML
0.25 INJECTION, SOLUTION SUBCUTANEOUS
Qty: 2 ML | Refills: 0 | Status: SHIPPED | OUTPATIENT
Start: 2024-11-05

## 2024-11-05 RX ORDER — METOPROLOL SUCCINATE 25 MG/1
25 TABLET, EXTENDED RELEASE ORAL DAILY
Qty: 90 TABLET | Refills: 3 | Status: SHIPPED | OUTPATIENT
Start: 2024-11-05 | End: 2025-11-05

## 2024-11-05 NOTE — PROGRESS NOTES
SUNI Cruz   [facility]  [address]   [place] [address]     PATIENT NAME: Glendy Engle  : 1975  DATE: 24  MRN: [id number]      Billing Provider: SUNI Cruz  Level of Service: UT OFFICE/OUTPT VISIT, EST, LEVL IV, 30-39 MIN  Patient PCP Information       Provider PCP Type    SUNI Cruz General            Reason for Visit / Chief Complaint: Hypertension (Pt is having elevated BP readings and brought her BP log) and Ankle Pain (Left ankle pain )         History of Present Illness / Problem Focused Workflow     Glendy Engle is a 49 y.o. female presents to the clinic  with HTN  and recentl elevated bp readings.   138 -159/80-90 and is currently  taking lisinopril 20mg only. She had chronic hypokalemia with use of HCTZ.    She brought glucose log as well and  glucoses are  110-237--most recent A1c was 5.5 last month.    She has histroy of fatty liver  disease as well as GERD and follows with Dr Navarrete      Review of Systems     Review of Systems   Constitutional:  Negative for fatigue.   HENT:  Negative for nasal congestion and sore throat.    Respiratory:  Negative for cough, chest tightness and shortness of breath.    Cardiovascular:  Positive for leg swelling. Negative for chest pain and palpitations.   Gastrointestinal:  Negative for nausea, vomiting and reflux.   Musculoskeletal:  Positive for arthralgias (left foot).   Neurological:  Negative for weakness and memory loss.   Psychiatric/Behavioral:  Negative for confusion and sleep disturbance.         Medical / Social / Family History     Past Medical History:   Diagnosis Date    Abnormal LFTs 2024    Abnormal pulmonary function test 2019    Abnormal radiograph     Abnormal thyroid blood test 2016    Anxiety 10/07/2024    Asthma     Exposure to severe acute respiratory syndrome coronavirus 2 (SARS-CoV-2) 2021    GERD (gastroesophageal reflux disease)     Hypertension     Mixed stress and urge  urinary incontinence 07/27/2022    SHERLYN on CPAP     Thyroid nodule        Past Surgical History:   Procedure Laterality Date    CHOLECYSTECTOMY      COLONOSCOPY      ESOPHAGOGASTRODUODENOSCOPY      HYSTERECTOMY      LEG SURGERY      vein surgery on right leg    REDUCTION OF BOTH BREASTS      VASCULAR SURGERY      Rt GSV Laser Ablation Phlebectomy: Dr. Sergey Esteves  2017       Social History  Ms.  reports that she has never smoked. She has never been exposed to tobacco smoke. She has never used smokeless tobacco. She reports that she does not currently use alcohol. She reports that she does not use drugs.    Family History  Ms.'s family history includes Cancer in her father and maternal aunt; Diabetes in her maternal grandmother and paternal grandmother; Heart disease in her mother and paternal grandfather; Heart failure in her mother; Hypertension in her mother.    Medications and Allergies     Medications  Outpatient Medications Marked as Taking for the 11/5/24 encounter (Office Visit) with Kelli Paez FNP   Medication Sig Dispense Refill    albuterol (PROVENTIL/VENTOLIN HFA) 90 mcg/actuation inhaler Inhale 2 puffs into the lungs every 6 (six) hours as needed for Wheezing. Rescue 18 g 5    budesonide-formoterol 160-4.5 mcg (SYMBICORT) 160-4.5 mcg/actuation HFAA Inhale 2 puffs into the lungs every 12 (twelve) hours. Controller 10.2 g 5    cetirizine (ZYRTEC) 10 MG tablet Take 1 tablet (10 mg total) by mouth once daily. 90 tablet 1    cyclobenzaprine (FLEXERIL) 10 MG tablet Take 10 mg by mouth 3 (three) times daily as needed for Muscle spasms.      famotidine (PEPCID) 20 MG tablet Take 1 tablet (20 mg total) by mouth every evening. 30 tablet 5    fexofenadine (ALLEGRA) 180 MG tablet Take 180 mg by mouth once daily.      HYDROcodone-acetaminophen (NORCO)  mg per tablet Take 1 tablet by mouth 2 (two) times daily.      lisinopriL (PRINIVIL,ZESTRIL) 20 MG tablet Take 1 tablet (20 mg total) by mouth once daily. 90  "tablet 3    metFORMIN (GLUCOPHAGE) 500 MG tablet Take 1 tablet (500 mg total) by mouth 2 (two) times daily with meals. 180 tablet 1    pantoprazole (PROTONIX) 40 MG tablet Take 1 tablet (40 mg total) by mouth once daily. 30 tablet 3    paroxetine (PAXIL) 20 MG tablet Take 1 tablet (20 mg total) by mouth every evening. 90 tablet 1    simvastatin (ZOCOR) 40 MG tablet Take 1 tablet (40 mg total) by mouth every evening. 90 tablet 3     Current Facility-Administered Medications for the 11/5/24 encounter (Office Visit) with Kelli Paez FNP   Medication Dose Route Frequency Provider Last Rate Last Admin    [COMPLETED] COVID-19 (Moderna) 50 mcg/0.5 mL IM vaccine (>/= 13 yo) 0.5 mL  0.5 mL Intramuscular 1 time in Clinic/HOD Kelli Paez, FNP   0.5 mL at 11/05/24 1516    [COMPLETED] influenza (Flulaval, Fluzone, Fluarix) 45 mcg/0.5 mL IM vaccine (> or = 6 mo) 0.5 mL  0.5 mL Intramuscular 1 time in Clinic/HOD Kelli Paez, FNP   0.5 mL at 11/05/24 1517       Allergies  Review of patient's allergies indicates:   Allergen Reactions    Sulfa (sulfonamide antibiotics) Hives    Bactrim [sulfamethoxazole-trimethoprim] Hives       Physical Examination     Vitals:    11/05/24 1423   BP: (!) 150/94   Pulse: 78   Resp: 18   Temp: 98.2 °F (36.8 °C)   TempSrc: Oral   SpO2: 97%   Weight: 115.6 kg (254 lb 12.8 oz)   Height: 5' 3" (1.6 m)      Physical Exam  Constitutional:       Appearance: Normal appearance.   Cardiovascular:      Rate and Rhythm: Normal rate and regular rhythm.      Pulses: Normal pulses.      Heart sounds: Normal heart sounds.   Pulmonary:      Effort: Pulmonary effort is normal.      Breath sounds: Normal breath sounds.   Musculoskeletal:      Right lower leg: No edema.      Left lower leg: No edema.      Comments: Tender to palp left lower leg and ankle/heel area with mild swelling.  Hx of  bone spur in area   Skin:     General: Skin is warm and dry.   Neurological:      Mental Status: She is alert and oriented to " person, place, and time.          Assessment and Plan (including Health Maintenance)     :    Plan:  will  continue lisinopril 20mg daily and add metoprolol 25mg daily  and she will keep bp log x 2 weeks and follow up  Will try ordering wegovy for weight loss, fatty liver disease (MASH) along with prediabetes and see if can get paid.   Pt will continue to watch diet , avoding eating late, get regular exercise and avoid starchy foods and sweets.    Recommend she use ice water bottles to massage soles and  heel spur and strecthing exercises frequently.         Health Maintenance Due   Topic Date Due    Eye Exam  Never done    HIV Screening  Never done    Mammogram  01/17/2025       Problem List Items Addressed This Visit          Cardiac/Vascular    Hypertension (Chronic)       Endocrine    Prediabetes    Morbid obesity - Primary     Other Visit Diagnoses       Need for influenza vaccination        Need for COVID-19 vaccine        Metabolic dysfunction-associated steatohepatitis (MASH)  (Chronic)           .  Morbid obesity  -     semaglutide, weight loss, (WEGOVY) 0.25 mg/0.5 mL PnIj; Inject 0.25 mg into the skin every 7 days.  Dispense: 2 mL; Refill: 0    Need for influenza vaccination  -     influenza (Flulaval, Fluzone, Fluarix) 45 mcg/0.5 mL IM vaccine (> or = 6 mo) 0.5 mL    Need for COVID-19 vaccine  -     COVID-19 (Moderna) 50 mcg/0.5 mL IM vaccine (>/= 11 yo) 0.5 mL    Primary hypertension  -     metoprolol succinate (TOPROL-XL) 25 MG 24 hr tablet; Take 1 tablet (25 mg total) by mouth once daily.  Dispense: 90 tablet; Refill: 3    Metabolic dysfunction-associated steatohepatitis (MASH)  -     semaglutide, weight loss, (WEGOVY) 0.25 mg/0.5 mL PnIj; Inject 0.25 mg into the skin every 7 days.  Dispense: 2 mL; Refill: 0    Prediabetes  -     semaglutide, weight loss, (WEGOVY) 0.25 mg/0.5 mL PnIj; Inject 0.25 mg into the skin every 7 days.  Dispense: 2 mL; Refill: 0       Health Maintenance Topics with due status:  Not Due       Topic Last Completion Date    TETANUS VACCINE 05/30/2017    Colorectal Cancer Screening 09/14/2023    Diabetes Urine Screening 08/08/2024    Foot Exam 08/08/2024    Lipid Panel 08/08/2024    Hemoglobin A1c 09/30/2024    Low Dose Statin 10/09/2024    RSV Vaccine (Age 60+ and Pregnant patients) Not Due       Procedures     Future Appointments   Date Time Provider Department Center   11/19/2024  9:30 AM Kelli Paez FNP [facility]   12/5/2024  2:30 PM Kelli Paez FNP [facility]   12/17/2024 10:00 AM [facility] [address] [facility]    12/17/2024 10:15 AM Ed German MD [facility]        No follow-ups on file.     Signature:  SUNI Cruz    Date of encounter: 11/5/24

## 2024-11-19 ENCOUNTER — OFFICE VISIT (OUTPATIENT)
Dept: FAMILY MEDICINE | Facility: CLINIC | Age: 49
End: 2024-11-19
Payer: COMMERCIAL

## 2024-11-19 VITALS
HEIGHT: 63 IN | WEIGHT: 255.81 LBS | BODY MASS INDEX: 45.32 KG/M2 | RESPIRATION RATE: 18 BRPM | DIASTOLIC BLOOD PRESSURE: 82 MMHG | HEART RATE: 65 BPM | OXYGEN SATURATION: 65 % | SYSTOLIC BLOOD PRESSURE: 125 MMHG | TEMPERATURE: 98 F

## 2024-11-19 DIAGNOSIS — Z11.4 SCREENING FOR HIV (HUMAN IMMUNODEFICIENCY VIRUS): ICD-10-CM

## 2024-11-19 DIAGNOSIS — E87.6 HYPOKALEMIA: Chronic | ICD-10-CM

## 2024-11-19 DIAGNOSIS — Z13.220 SCREENING FOR LIPID DISORDERS: ICD-10-CM

## 2024-11-19 DIAGNOSIS — G47.33 OSA ON CPAP: Chronic | ICD-10-CM

## 2024-11-19 DIAGNOSIS — K21.9 GASTROESOPHAGEAL REFLUX DISEASE WITHOUT ESOPHAGITIS: Chronic | ICD-10-CM

## 2024-11-19 DIAGNOSIS — K76.0 STEATOSIS OF LIVER: Chronic | ICD-10-CM

## 2024-11-19 DIAGNOSIS — Z13.1 SCREENING FOR DIABETES MELLITUS: ICD-10-CM

## 2024-11-19 DIAGNOSIS — R73.03 PREDIABETES: Chronic | ICD-10-CM

## 2024-11-19 DIAGNOSIS — Z00.00 ROUTINE GENERAL MEDICAL EXAMINATION AT A HEALTH CARE FACILITY: Primary | ICD-10-CM

## 2024-11-19 DIAGNOSIS — I10 PRIMARY HYPERTENSION: Chronic | ICD-10-CM

## 2024-11-19 PROBLEM — R13.10 DYSPHAGIA: Status: RESOLVED | Noted: 2019-10-10 | Resolved: 2024-11-19

## 2024-11-19 PROBLEM — Z87.19 PERSONAL HISTORY OF OTHER DISEASES OF THE DIGESTIVE SYSTEM: Status: ACTIVE | Noted: 2024-10-07

## 2024-11-19 PROBLEM — G89.29 CHRONIC PAIN: Status: ACTIVE | Noted: 2024-10-07

## 2024-11-19 LAB
ANION GAP SERPL CALCULATED.3IONS-SCNC: 13 MMOL/L (ref 7–16)
BUN SERPL-MCNC: 11 MG/DL (ref 7–19)
BUN/CREAT SERPL: 15 (ref 6–20)
CALCIUM SERPL-MCNC: 9 MG/DL (ref 8.4–10.2)
CHLORIDE SERPL-SCNC: 104 MMOL/L (ref 98–107)
CHOLEST SERPL-MCNC: 146 MG/DL
CHOLEST/HDLC SERPL: 2.9 {RATIO}
CO2 SERPL-SCNC: 29 MMOL/L (ref 22–29)
CREAT SERPL-MCNC: 0.73 MG/DL (ref 0.55–1.02)
CREAT UR-MCNC: 140 MG/DL (ref 15–325)
EGFR (NO RACE VARIABLE) (RUSH/TITUS): 102 ML/MIN/1.73M2
GLUCOSE SERPL-MCNC: 99 MG/DL (ref 74–100)
HDLC SERPL-MCNC: 50 MG/DL (ref 35–60)
LDLC SERPL CALC-MCNC: 75 MG/DL
LDLC/HDLC SERPL: 1.5 {RATIO}
MICROALBUMIN UR-MCNC: <5 MG/DL (ref 0–2.8)
MICROALBUMIN/CREAT RATIO PNL UR: <35.7 MG/G (ref 0–30)
NONHDLC SERPL-MCNC: 96 MG/DL
POTASSIUM SERPL-SCNC: 4 MMOL/L (ref 3.5–5.1)
SODIUM SERPL-SCNC: 142 MMOL/L (ref 136–145)
TRIGL SERPL-MCNC: 103 MG/DL (ref 37–140)
VLDLC SERPL-MCNC: 21 MG/DL

## 2024-11-19 PROCEDURE — 82570 ASSAY OF URINE CREATININE: CPT | Mod: ,,, | Performed by: CLINICAL MEDICAL LABORATORY

## 2024-11-19 PROCEDURE — 82043 UR ALBUMIN QUANTITATIVE: CPT | Mod: ,,, | Performed by: CLINICAL MEDICAL LABORATORY

## 2024-11-19 PROCEDURE — 3008F BODY MASS INDEX DOCD: CPT | Mod: CPTII,,, | Performed by: NURSE PRACTITIONER

## 2024-11-19 PROCEDURE — 80061 LIPID PANEL: CPT | Mod: ,,, | Performed by: CLINICAL MEDICAL LABORATORY

## 2024-11-19 PROCEDURE — 80048 BASIC METABOLIC PNL TOTAL CA: CPT | Mod: ,,, | Performed by: CLINICAL MEDICAL LABORATORY

## 2024-11-19 PROCEDURE — 3060F POS MICROALBUMINURIA REV: CPT | Mod: CPTII,,, | Performed by: NURSE PRACTITIONER

## 2024-11-19 PROCEDURE — 87389 HIV-1 AG W/HIV-1&-2 AB AG IA: CPT | Mod: ,,, | Performed by: CLINICAL MEDICAL LABORATORY

## 2024-11-19 PROCEDURE — 1159F MED LIST DOCD IN RCRD: CPT | Mod: CPTII,,, | Performed by: NURSE PRACTITIONER

## 2024-11-19 PROCEDURE — 3066F NEPHROPATHY DOC TX: CPT | Mod: CPTII,,, | Performed by: NURSE PRACTITIONER

## 2024-11-19 PROCEDURE — 3044F HG A1C LEVEL LT 7.0%: CPT | Mod: CPTII,,, | Performed by: NURSE PRACTITIONER

## 2024-11-19 PROCEDURE — 4010F ACE/ARB THERAPY RXD/TAKEN: CPT | Mod: CPTII,,, | Performed by: NURSE PRACTITIONER

## 2024-11-19 PROCEDURE — 3079F DIAST BP 80-89 MM HG: CPT | Mod: CPTII,,, | Performed by: NURSE PRACTITIONER

## 2024-11-19 PROCEDURE — 3074F SYST BP LT 130 MM HG: CPT | Mod: CPTII,,, | Performed by: NURSE PRACTITIONER

## 2024-11-19 PROCEDURE — 99396 PREV VISIT EST AGE 40-64: CPT | Mod: ,,, | Performed by: NURSE PRACTITIONER

## 2024-11-19 NOTE — PATIENT INSTRUCTIONS
will  check labs today and refill meds as needed. Encouraged to continue with diet and weight loss.  Recommended heart healthy diet--avoidance of snack foods, sugary foods ad should avoid spicy foods due to her reflux.   Will get eye exam from [facility].

## 2024-11-19 NOTE — PROGRESS NOTES
SUNI Cruz   [facility]  [address]   [place] [address]     PATIENT NAME: Glendy Engle  : 1975  DATE: 24  MRN: [id number]      Billing Provider: SUNI Cruz  Level of Service:   Patient PCP Information       Provider PCP Type    SUNI Cruz General            Reason for Visit / Chief Complaint: [facility]         History of Present Illness / Problem Focused Workflow     Glendy Engle is a 49 y.o. female presents to the clinic  for wellness exam.   She is doing fairly well in general--she has been haivng  normal blood pressure readings recently  with lisinopril and metoprolol.    She has  pre diabetes and is  on metformin and controlled. She has [facility] and is following  with Dr Rico on a regular basis. We have tried to get her on  weight loss meds (GLP1) and has been declined by insurance company.  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  She has bad reflux and is on pantoprazole  and pepcid.   Her LE edema has improved with use of her compression socks.  She had eye exam done recently  in [place] and all ok, got new glasses.   She is using her ventolin prn  and has Symbicort if she needs to resume.    Mammogram  is not due until  2025 at [facility].       Review of Systems     Review of Systems   Constitutional:  Negative for fatigue.   HENT:  Negative for nasal congestion and sore throat.    Respiratory:  Negative for cough, chest tightness and shortness of breath.    Cardiovascular:  Negative for chest pain, palpitations and leg swelling.   Gastrointestinal:  Positive for reflux. Negative for nausea and vomiting.   Neurological:  Negative for weakness and memory loss.   Psychiatric/Behavioral:  Negative for confusion and sleep disturbance.         Medical / Social / Family History     Past Medical History:   Diagnosis Date    Abnormal LFTs 2024     Abnormal pulmonary function test 12/2019    Abnormal radiograph     Abnormal thyroid blood test 12/01/2016    Anxiety 10/07/2024    Asthma     Exposure to severe acute respiratory syndrome coronavirus 2 (SARS-CoV-2) 04/02/2021    GERD (gastroesophageal reflux disease)     Hypertension     Mixed stress and urge urinary incontinence 07/27/2022    SHERLYN on CPAP     Thyroid nodule        Past Surgical History:   Procedure Laterality Date    CHOLECYSTECTOMY      COLONOSCOPY      ESOPHAGOGASTRODUODENOSCOPY      HYSTERECTOMY      LEG SURGERY      vein surgery on right leg    REDUCTION OF BOTH BREASTS      VASCULAR SURGERY      Rt GSV Laser Ablation Phlebectomy: Dr. Sergey Esteves  2017       Social History  Ms.  reports that she has never smoked. She has never been exposed to tobacco smoke. She has never used smokeless tobacco. She reports that she does not currently use alcohol. She reports that she does not use drugs.    Family History  Ms.'s family history includes Cancer in her father and maternal aunt; Diabetes in her maternal grandmother and paternal grandmother; Heart disease in her mother and paternal grandfather; Heart failure in her mother; Hypertension in her mother.    Medications and Allergies     Medications  Outpatient Medications Marked as Taking for the 11/19/24 encounter (Office Visit) with Kelli Paez FNP   Medication Sig Dispense Refill    albuterol (PROVENTIL/VENTOLIN HFA) 90 mcg/actuation inhaler Inhale 2 puffs into the lungs every 6 (six) hours as needed for Wheezing. Rescue 18 g 5    budesonide-formoterol 160-4.5 mcg (SYMBICORT) 160-4.5 mcg/actuation HFAA Inhale 2 puffs into the lungs every 12 (twelve) hours. Controller 10.2 g 5    cetirizine (ZYRTEC) 10 MG tablet Take 1 tablet (10 mg total) by mouth once daily. 90 tablet 1    cyclobenzaprine (FLEXERIL) 10 MG tablet Take 10 mg by mouth 3 (three) times daily as needed for Muscle spasms.      famotidine (PEPCID) 20 MG tablet Take 1 tablet (20 mg total) by  "mouth every evening. 30 tablet 5    fexofenadine (ALLEGRA) 180 MG tablet Take 180 mg by mouth once daily.      HYDROcodone-acetaminophen (NORCO)  mg per tablet Take 1 tablet by mouth 2 (two) times daily.      lisinopriL (PRINIVIL,ZESTRIL) 20 MG tablet Take 1 tablet (20 mg total) by mouth once daily. 90 tablet 3    metFORMIN (GLUCOPHAGE) 500 MG tablet Take 1 tablet (500 mg total) by mouth 2 (two) times daily with meals. 180 tablet 1    metoprolol succinate (TOPROL-XL) 25 MG 24 hr tablet Take 1 tablet (25 mg total) by mouth once daily. 90 tablet 3    pantoprazole (PROTONIX) 40 MG tablet Take 1 tablet (40 mg total) by mouth once daily. 30 tablet 3    paroxetine (PAXIL) 20 MG tablet Take 1 tablet (20 mg total) by mouth every evening. 90 tablet 1    simvastatin (ZOCOR) 40 MG tablet Take 1 tablet (40 mg total) by mouth every evening. 90 tablet 3       Allergies  Review of patient's allergies indicates:   Allergen Reactions    Sulfa (sulfonamide antibiotics) Hives    Bactrim [sulfamethoxazole-trimethoprim] Hives       Physical Examination     Vitals:    11/19/24 0930   BP: 125/82   Pulse: 65   Resp: 18   Temp: 98.1 °F (36.7 °C)   TempSrc: Oral   SpO2: (!) 65%   Weight: 116 kg (255 lb 12.8 oz)   Height: 5' 3" (1.6 m)      Physical Exam  Constitutional:       Appearance: Normal appearance. She is obese.   HENT:      Mouth/Throat:      Mouth: Mucous membranes are dry.      Comments: Dental caries noted  Eyes:      Extraocular Movements: Extraocular movements intact.   Cardiovascular:      Rate and Rhythm: Normal rate and regular rhythm.      Pulses: Normal pulses.      Heart sounds: Normal heart sounds.   Pulmonary:      Effort: Pulmonary effort is normal.      Breath sounds: Normal breath sounds.   Musculoskeletal:      Cervical back: Neck supple.      Right lower leg: Edema (trace) present.      Left lower leg: Edema (trace) present.   Lymphadenopathy:      Cervical: No cervical adenopathy.   Skin:     General: Skin " is warm and dry.   Neurological:      Mental Status: She is alert and oriented to person, place, and time.   Psychiatric:         Mood and Affect: Mood normal.         Behavior: Behavior normal.          Assessment and Plan (including Health Maintenance)     :    Plan:  will  check labs today and refill meds as needed. Encouraged to continue with diet and weight loss.  Recommended heart healthy diet--avoidance of snack foods, sugary foods ad should avoid spicy foods due to her reflux.   Will get eye exam from [facility].         Health Maintenance Due   Topic Date Due    Eye Exam  Never done    HIV Screening  Never done    Mammogram  01/17/2025       Problem List Items Addressed This Visit    None  .  There are no diagnoses linked to this encounter.   Health Maintenance Topics with due status: Not Due       Topic Last Completion Date    TETANUS VACCINE 05/30/2017    Colorectal Cancer Screening 09/14/2023    Diabetes Urine Screening 08/08/2024    Foot Exam 08/08/2024    Lipid Panel 08/08/2024    Hemoglobin A1c 09/30/2024    Low Dose Statin 11/19/2024    RSV Vaccine (Age 60+ and Pregnant patients) Not Due       Procedures     Future Appointments   Date Time Provider Department Center   12/5/2024  2:30 PM Kelli Paez FNP [facility]   12/17/2024 10:00 AM [facility]   12/17/2024 10:15 AM [facility]   11/20/2025  9:30 AM Kelli Paez FNP [facility] [place]        No follow-ups on file.     Signature:  SUNI Cruz    Date of encounter: 11/19/24

## 2024-11-20 NOTE — PROGRESS NOTES
Notify Crystal that  her urine test  shows kidney function is stable.   Cholesterol is good.  Electrolytes are all normal /tm

## 2024-11-21 LAB — HIV 1+O+2 AB SERPL QL: NORMAL

## 2024-11-25 ENCOUNTER — PATIENT OUTREACH (OUTPATIENT)
Facility: HOSPITAL | Age: 49
End: 2024-11-25
Payer: COMMERCIAL

## 2024-11-25 NOTE — LETTER
AUTHORIZATION FOR RELEASE OF   CONFIDENTIAL INFORMATION    Dear Maurisio [facility],    We are seeing Glendy Engle, date of birth 1975, in the clinic at [facility]. Kelli Paez FNP is the patient's PCP. Glendy Engle has an outstanding lab/procedure at the time we reviewed her chart. In order to help keep her health information updated, she has authorized us to request the following medical record(s):        (  )  MAMMOGRAM                                      (  )  COLONOSCOPY      (  )  PAP SMEAR                                          (  )  OUTSIDE LAB RESULTS     (  )  DEXA SCAN                                          (X)  EYE EXAM            (  )  FOOT EXAM                                          (  )  ENTIRE RECORD     (  )  OUTSIDE IMMUNIZATIONS                 (  )  _______________         Please fax records to [facility], Daksha Long, [phone].     If you have any questions, please contact [phone].          Patient Name: Glendy Engle  : 1975  Patient Phone #: [phone]

## 2024-11-25 NOTE — PROGRESS NOTES
11/25/2024   --Chart accessed for: Care Gaps  Requested eye exam records from [facility] Due   Topic Date Due    Eye Exam  Never done    Mammogram  01/17/2025

## 2024-12-05 ENCOUNTER — OFFICE VISIT (OUTPATIENT)
Dept: FAMILY MEDICINE | Facility: CLINIC | Age: 49
End: 2024-12-05
Payer: COMMERCIAL

## 2024-12-05 VITALS
BODY MASS INDEX: 45.07 KG/M2 | RESPIRATION RATE: 18 BRPM | HEIGHT: 63 IN | OXYGEN SATURATION: 96 % | TEMPERATURE: 99 F | DIASTOLIC BLOOD PRESSURE: 79 MMHG | WEIGHT: 254.38 LBS | SYSTOLIC BLOOD PRESSURE: 121 MMHG | HEART RATE: 67 BPM

## 2024-12-05 DIAGNOSIS — K76.0 STEATOSIS OF LIVER: Chronic | ICD-10-CM

## 2024-12-05 DIAGNOSIS — I10 PRIMARY HYPERTENSION: Primary | Chronic | ICD-10-CM

## 2024-12-05 DIAGNOSIS — E11.9 DIABETES MELLITUS WITHOUT COMPLICATION: Chronic | ICD-10-CM

## 2024-12-05 DIAGNOSIS — G47.33 OSA ON CPAP: Chronic | ICD-10-CM

## 2024-12-05 PROCEDURE — 3078F DIAST BP <80 MM HG: CPT | Mod: CPTII,,, | Performed by: NURSE PRACTITIONER

## 2024-12-05 PROCEDURE — 1159F MED LIST DOCD IN RCRD: CPT | Mod: CPTII,,, | Performed by: NURSE PRACTITIONER

## 2024-12-05 PROCEDURE — 3008F BODY MASS INDEX DOCD: CPT | Mod: CPTII,,, | Performed by: NURSE PRACTITIONER

## 2024-12-05 PROCEDURE — 3061F NEG MICROALBUMINURIA REV: CPT | Mod: CPTII,,, | Performed by: NURSE PRACTITIONER

## 2024-12-05 PROCEDURE — 4010F ACE/ARB THERAPY RXD/TAKEN: CPT | Mod: CPTII,,, | Performed by: NURSE PRACTITIONER

## 2024-12-05 PROCEDURE — 99213 OFFICE O/P EST LOW 20 MIN: CPT | Mod: ,,, | Performed by: NURSE PRACTITIONER

## 2024-12-05 PROCEDURE — 3066F NEPHROPATHY DOC TX: CPT | Mod: CPTII,,, | Performed by: NURSE PRACTITIONER

## 2024-12-05 PROCEDURE — 3044F HG A1C LEVEL LT 7.0%: CPT | Mod: CPTII,,, | Performed by: NURSE PRACTITIONER

## 2024-12-05 PROCEDURE — 3074F SYST BP LT 130 MM HG: CPT | Mod: CPTII,,, | Performed by: NURSE PRACTITIONER

## 2024-12-05 RX ORDER — VALSARTAN 160 MG/1
160 TABLET ORAL DAILY
Qty: 30 TABLET | Refills: 1 | Status: SHIPPED | OUTPATIENT
Start: 2024-12-05 | End: 2025-12-05

## 2024-12-05 NOTE — PROGRESS NOTES
Patient ID: Glendy Engle is a 49 y.o. female.    Chief Complaint: Hypertension    History of Present Illness    CHIEF COMPLAINT:  Patient presents today for blood pressure follow-up.    HYPERTENSION:  She reports elevated blood pressure readings, particularly in the morning. Morning systolic readings range from 147 to 179 mmHg, with the lowest recorded at 131 mmHg. Diastolic readings vary between 60 and 95 mmHg. Heart rate remains within normal range, typically in the 60s and 70s. She denies increased salt intake and states she has been actively reducing salt consumption. She currently takes Lisinopril 20 mg and Metoprolol 25 mg for blood pressure control. Previous use of diuretics resulted in persistently low potassium levels, leading to discontinuation. Blood pressure remained stable after stopping hydrochlorothiazide. Prior use of amlodipine caused significant edema. The combination of diuretics and amlodipine led to consistently low potassium levels.    MEDICATIONS:  Current medications include Lisinopril 20 mg, Metoprolol 25 mg, and Hydrocodone for chronic pain management.    WEIGHT MANAGEMENT:  She reports actively trying to lose weight through dietary modifications. She has previously attempted to obtain GLP-1 medications for weight loss, but these requests have been denied by her insurance provider multiple times. The insurance company has provided alternative weight loss options, including Contrave, which is not suitable due to her chronic pain management regimen involving hydrocodone.    PREVENTIVE CARE:  Her mammogram is due in March of next year. She recently completed an eye exam, and the report is pending from her eye doctor.    RECENT LAB RESULTS:  HIV test was negative. Urine test revealed a small amount of protein, which may be related to blood pressure. Cholesterol levels were good, with LDL at 75. Electrolytes were all within normal range. Kidney function was good, with GFR at 102. A1C in  October was 5.5.      ROS:  General: -fever, -chills, -fatigue, -weight gain, +weight loss  Eyes: -vision changes, -redness, -discharge  ENT: -ear pain, -nasal congestion, -sore throat  Cardiovascular: -chest pain, -palpitations, -lower extremity edema  Respiratory: -cough, -shortness of breath  Gastrointestinal: -abdominal pain, -nausea, -vomiting, -diarrhea, -constipation, -blood in stool  Genitourinary: -dysuria, -hematuria, -frequency  Musculoskeletal: -joint pain, -muscle pain  Skin: -rash, -lesion  Neurological: -headache, -dizziness, -numbness, -tingling  Psychiatric: -anxiety, -depression, -sleep difficulty         Physical Exam    Vitals: Heart rate: 67 bpm.  General: No acute distress. Well-developed. Well-nourished.  Eyes: EOMI. Sclerae anicteric.  HENT: Normocephalic. Atraumatic. Nares patent. Moist oral mucosa.  Ears: Bilateral TMs clear. Bilateral EACs clear.  Cardiovascular: Regular rate. Regular rhythm. No murmurs. No rubs. No gallops. Normal S1, S2. Heart sounds regular.  Respiratory: Normal respiratory effort. Clear to auscultation bilaterally. No rales. No rhonchi. No wheezing.  Abdomen: Soft. Non-tender. Non-distended. Normoactive bowel sounds.  Musculoskeletal: No  obvious deformity.  Extremities: No lower extremity edema.  Neurological: Alert & oriented x3. No slurred speech. Normal gait.  Psychiatric: Normal mood. Normal affect. Good insight. Good judgment.  Skin: Warm. Dry. No rash.         Assessment & Plan    IMPRESSION:  - Changed antihypertensive regimen due to persistently elevated blood pressure readings  - Considered weight loss medication options, but patient ineligible for Contrave due to concurrent hydrocodone use for chronic pain management  - Decided against prescribing Phentermine or Phendemocrazine due to lack of familiarity and potential adverse effects  - Opted to switch from lisinopril to valsartan for improved blood pressure control  - Maintained current metoprolol dose to  avoid multiple medication changes simultaneously  - Reviewed recent lab results, noting adequate cholesterol levels, normal electrolytes, and kidney function    HYPERTENSION:  - Monitored the patient's blood pressure readings, noting consistently high values with morning readings ranging from 131 to 179 systolic and 60s to 95 diastolic.  - Heart rate has been normal in the 60s and 70s.  - Evaluated current blood pressure medications: lisinopril 20 mg and metoprolol 25 mg.  - Measured heart rate at 67 during the visit.  - Auscultated the heart, finding it regular with no abnormalities.  - Assessed that blood pressure remains elevated and requires medication adjustment.  - Discontinued lisinopril 20 mg and initiated valsartan 160 mg daily.  - Continued metoprolol 25 mg daily.  - Explained the difference between valsartan and lisinopril, and the relationship between proteinuria and hypertension.  - Instructed the patient to continue monitoring blood pressure at home and bring logs for review.  - Advised the patient to contact the office if experiencing any problems with the new medication (valsartan).  - Scheduled follow up in 3 months for blood pressure management and review of home blood pressure logs.    CHRONIC PAIN:  - Assessed the patient's current chronic pain management with hydrocodone.  - Considered pain management when discussing weight loss options, avoiding medications that would interfere with current pain treatment.    HYPOKALEMIA:  - Noted the patient's history of hypokalemia when on diuretics.  - Evaluated current electrolyte levels, including potassium, which are normal.  - Avoided prescribing diuretics due to past issues with potassium levels.    WEIGHT MANAGEMENT:  - Discussed potential adverse effects of weight loss medications, including elevated blood pressure and amphetamine-like properties.  - Considered various weight loss medication options, including GLP-1s, Contrave, Phentermine, and  Phendemocrazine, taking into account the patient's other health conditions.  - Referred the patient to Sara Garay, nurse at [facility], for potential weight loss medication management (Phentermine or Phendemocrazine).  - Patient to continue current weight loss efforts.    HYPERLIPIDEMIA:  - Monitored the patient's cholesterol levels.  - Evaluated cholesterol levels, noting LDL at 75, which is within the desired range.  - Continued current cholesterol medication.    PROTEINURIA:  - Monitored urine test results, which showed mild proteinuria.  - Assessed the relationship between hypertension and proteinuria, noting the importance of monitoring to prevent worsening kidney damage.    OTHER INSTRUCTIONS:  - Inquired about the patient's diet and salt intake.  - Patient to maintain current efforts to reduce salt intake.    MAMMOGRAM:  - Verified the patient's last mammogram was in March.  - Noted that the next mammogram is due in March of the following year.              No follow-ups on file.    This note was generated with the assistance of ambient listening technology. Verbal consent was obtained by the patient and accompanying visitor(s) for the recording of patient appointment to facilitate this note. I attest to having reviewed and edited the generated note for accuracy, though some syntax or spelling errors may persist. Please contact the author of this note for any clarification.

## 2024-12-06 ENCOUNTER — PATIENT OUTREACH (OUTPATIENT)
Facility: HOSPITAL | Age: 49
End: 2024-12-06
Payer: COMMERCIAL

## 2024-12-09 RX ORDER — PANTOPRAZOLE SODIUM 40 MG/1
40 TABLET, DELAYED RELEASE ORAL DAILY
Qty: 90 TABLET | Refills: 1 | Status: SHIPPED | OUTPATIENT
Start: 2024-12-09

## 2024-12-09 NOTE — TELEPHONE ENCOUNTER
----- Message from Ronna sent at 12/9/2024 10:02 AM CST -----  Needs refills on protonix called in to [facility] pharmacy in [place]

## 2024-12-16 ENCOUNTER — OFFICE VISIT (OUTPATIENT)
Dept: SURGERY | Facility: CLINIC | Age: 49
End: 2024-12-16
Attending: SURGERY
Payer: COMMERCIAL

## 2024-12-16 ENCOUNTER — HOSPITAL ENCOUNTER (OUTPATIENT)
Dept: RADIOLOGY | Facility: HOSPITAL | Age: 49
Discharge: HOME | End: 2024-12-16
Attending: SURGERY
Payer: COMMERCIAL

## 2024-12-16 ENCOUNTER — TELEPHONE (OUTPATIENT)
Dept: FAMILY MEDICINE | Facility: CLINIC | Age: 49
End: 2024-12-16
Payer: COMMERCIAL

## 2024-12-16 VITALS — HEIGHT: 63 IN | WEIGHT: 255 LBS | BODY MASS INDEX: 45.18 KG/M2

## 2024-12-16 DIAGNOSIS — E04.1 THYROID NODULE: Primary | ICD-10-CM

## 2024-12-16 DIAGNOSIS — E04.1 THYROID NODULE: ICD-10-CM

## 2024-12-16 PROCEDURE — 76536 US EXAM OF HEAD AND NECK: CPT | Mod: 26,,, | Performed by: RADIOLOGY

## 2024-12-16 PROCEDURE — 4010F ACE/ARB THERAPY RXD/TAKEN: CPT | Mod: CPTII,,, | Performed by: SURGERY

## 2024-12-16 PROCEDURE — 76536 US EXAM OF HEAD AND NECK: CPT | Mod: TC

## 2024-12-16 PROCEDURE — 99213 OFFICE O/P EST LOW 20 MIN: CPT | Mod: PBBFAC,25 | Performed by: SURGERY

## 2024-12-16 PROCEDURE — 3066F NEPHROPATHY DOC TX: CPT | Mod: CPTII,,, | Performed by: SURGERY

## 2024-12-16 PROCEDURE — 3008F BODY MASS INDEX DOCD: CPT | Mod: CPTII,,, | Performed by: SURGERY

## 2024-12-16 PROCEDURE — 1159F MED LIST DOCD IN RCRD: CPT | Mod: CPTII,,, | Performed by: SURGERY

## 2024-12-16 PROCEDURE — 3044F HG A1C LEVEL LT 7.0%: CPT | Mod: CPTII,,, | Performed by: SURGERY

## 2024-12-16 PROCEDURE — 99999 PR PBB SHADOW E&M-EST. PATIENT-LVL III: CPT | Mod: PBBFAC,,, | Performed by: SURGERY

## 2024-12-16 PROCEDURE — 99203 OFFICE O/P NEW LOW 30 MIN: CPT | Mod: S$PBB,,, | Performed by: SURGERY

## 2024-12-16 PROCEDURE — 3061F NEG MICROALBUMINURIA REV: CPT | Mod: CPTII,,, | Performed by: SURGERY

## 2024-12-16 NOTE — TELEPHONE ENCOUNTER
Clinic received form from [employer] with [employer] that pt is DM and should be taking Valsartan or Lisinopril; pt verified that she is taking Valsartan 160mg and was refilled 10/05/2024 at [facility] in [place].

## 2024-12-16 NOTE — PROGRESS NOTES
General Surgery Progress Note    Subjective:      Patient ID: Glendy Engle is a 49 y.o. female.    Chief Complaint: Follow-up (US thryoid)      HPI:  A 9-year-old female here for yearly follow-up for thyroid nodule.  She has been doing well overall.  Little bit of throat soreness.  No dysphagia, neck swelling, choking, lumps.  She denies any symptoms of hypo or hyperthyroidism.  Ultrasound was performed today showed stable in size nodule    Past Medical History:   Diagnosis Date    Abnormal LFTs 11/05/2024    Abnormal pulmonary function test 12/2019    Abnormal radiograph     Abnormal thyroid blood test 12/01/2016    Anxiety 10/07/2024    Asthma     BMI 45.0-49.9, adult     Dysphagia 10/10/2019    Exposure to severe acute respiratory syndrome coronavirus 2 (SARS-CoV-2) 04/02/2021    GERD (gastroesophageal reflux disease)     Hypertension     Mixed stress and urge urinary incontinence 07/27/2022    SHERLYN on CPAP     Thyroid nodule      Past Surgical History:   Procedure Laterality Date    CHOLECYSTECTOMY      COLONOSCOPY      ESOPHAGOGASTRODUODENOSCOPY      HYSTERECTOMY      LEG SURGERY      vein surgery on right leg    REDUCTION OF BOTH BREASTS      VASCULAR SURGERY      Rt GSV Laser Ablation Phlebectomy: Dr. Sergey Esteves  2017     Social History     Socioeconomic History    Marital status:    Tobacco Use    Smoking status: Never     Passive exposure: Never    Smokeless tobacco: Never   Substance and Sexual Activity    Alcohol use: Not Currently    Drug use: Never    Sexual activity: Yes     Social Drivers of Health     Financial Resource Strain: Low Risk  (7/12/2023)    Overall Financial Resource Strain (CARDIA)     Difficulty of Paying Living Expenses: Not very hard   Food Insecurity: Food Insecurity Present (7/12/2023)    Hunger Vital Sign     Worried About Running Out of Food in the Last Year: Sometimes true     Ran Out of Food in the Last Year:  Sometimes true   Transportation Needs: No Transportation Needs (7/12/2023)    PRAPARE - Transportation     Lack of Transportation (Medical): No     Lack of Transportation (Non-Medical): No   Physical Activity: Inactive (7/12/2023)    Exercise Vital Sign     Days of Exercise per Week: 5 days     Minutes of Exercise per Session: 0 min   Stress: No Stress Concern Present (7/12/2023)    Citizen of [place] [employer] - Occupational Stress Questionnaire     Feeling of Stress : Only a little   Housing Stability: Low Risk  (7/12/2023)    Housing Stability Vital Sign     Unable to Pay for Housing in the Last Year: No     Number of Places Lived in the Last Year: 1     Unstable Housing in the Last Year: No         Current Outpatient Medications:     albuterol (PROVENTIL/VENTOLIN HFA) 90 mcg/actuation inhaler, Inhale 2 puffs into the lungs every 6 (six) hours as needed for Wheezing. Rescue, Disp: 18 g, Rfl: 5    budesonide-formoterol 160-4.5 mcg (SYMBICORT) 160-4.5 mcg/actuation HFAA, Inhale 2 puffs into the lungs every 12 (twelve) hours. Controller, Disp: 10.2 g, Rfl: 5    cetirizine (ZYRTEC) 10 MG tablet, Take 1 tablet (10 mg total) by mouth once daily., Disp: 90 tablet, Rfl: 1    cyclobenzaprine (FLEXERIL) 10 MG tablet, Take 10 mg by mouth 3 (three) times daily as needed for Muscle spasms., Disp: , Rfl:     famotidine (PEPCID) 20 MG tablet, Take 1 tablet (20 mg total) by mouth every evening., Disp: 30 tablet, Rfl: 5    HYDROcodone-acetaminophen (NORCO)  mg per tablet, Take 1 tablet by mouth 2 (two) times daily., Disp: , Rfl:     metFORMIN (GLUCOPHAGE) 500 MG tablet, Take 1 tablet (500 mg total) by mouth 2 (two) times daily with meals., Disp: 180 tablet, Rfl: 1    metoprolol succinate (TOPROL-XL) 25 MG 24 hr tablet, Take 1 tablet (25 mg total) by mouth once daily., Disp: 90 tablet, Rfl: 3    pantoprazole (PROTONIX) 40 MG tablet, Take 1 tablet (40 mg total) by mouth once daily., Disp: 90 tablet, Rfl: 1     "paroxetine (PAXIL) 20 MG tablet, Take 1 tablet (20 mg total) by mouth every evening., Disp: 90 tablet, Rfl: 1    simvastatin (ZOCOR) 40 MG tablet, Take 1 tablet (40 mg total) by mouth every evening., Disp: 90 tablet, Rfl: 3    valsartan (DIOVAN) 160 MG tablet, Take 1 tablet (160 mg total) by mouth once daily., Disp: 30 tablet, Rfl: 1    fexofenadine (ALLEGRA) 180 MG tablet, Take 180 mg by mouth once daily. (Patient not taking: Reported on 12/16/2024), Disp: , Rfl:     lisinopriL (PRINIVIL,ZESTRIL) 20 MG tablet, Take 1 tablet (20 mg total) by mouth once daily. (Patient not taking: Reported on 12/16/2024), Disp: 90 tablet, Rfl: 3  Review of patient's allergies indicates:   Allergen Reactions    Sulfa (sulfonamide antibiotics) Hives    Bactrim [sulfamethoxazole-trimethoprim] Hives       Ht 5' 3" (1.6 m)   Wt 115.7 kg (255 lb)   LMP 08/17/2007 (Approximate)   BMI 45.17 kg/m²     ROS      Objective:     Constitutional: Well, No apparent distress  Mental Status: Alert and oriented  x 3  Eyes: Normal sclera, normal eyelid  Neck: Trachea midline, no masses on neck exam  Respiratory: Clear to auscultation bilaterally with no wheezes/crackles/cough  Cardiac: Regular rate and rhythm, no murmur, rub, or gallops  Abdominal: Soft, non-tender, non-distented  Hernia: No appreciated hernias  Musculoskeletal: 5/5 strength no weakess no decreased range of montion  Neurologic: Cranial nerves II - XII intact    Labs/ Imaging: CBC:   Lab Results   Component Value Date/Time    WBC 8.33 08/08/2024 04:35 PM    RBC 4.76 08/08/2024 04:35 PM    HGB 14.9 08/08/2024 04:35 PM    HCT 44.3 08/08/2024 04:35 PM     (L) 08/08/2024 04:35 PM    MCV 93.1 08/08/2024 04:35 PM    MCH 31.3 (H) 08/08/2024 04:35 PM    MCHC 33.6 08/08/2024 04:35 PM     BMP:   Lab Results   Component Value Date/Time    GLU 99 11/19/2024 04:39 PM    CO2 29 11/19/2024 04:39 PM    BUN 11 11/19/2024 04:39 PM    CREATININE 0.73 11/19/2024 04:39 PM    CALCIUM 9.0 11/19/2024 " 04:39 PM     CMP:   Lab Results   Component Value Date/Time    GLU 99 11/19/2024 04:39 PM    CALCIUM 9.0 11/19/2024 04:39 PM    ALBUMIN 3.9 08/08/2024 04:35 PM    PROT 7.8 08/08/2024 04:35 PM     11/19/2024 04:39 PM    K 4.0 11/19/2024 04:39 PM    CO2 29 11/19/2024 04:39 PM     11/19/2024 04:39 PM    BUN 11 11/19/2024 04:39 PM    CREATININE 0.73 11/19/2024 04:39 PM    ALKPHOS 126 (H) 08/08/2024 04:35 PM    ALT 49 08/08/2024 04:35 PM    AST 48 (H) 08/08/2024 04:35 PM    BILITOT 0.7 08/08/2024 04:35 PM       Ultrasound showed stable in size nodule    Assessment:             1. Thyroid nodule          Plan:          No follow-ups on file.      Nodules in his staple last couple years.  No symptoms from it.  Advised patient likely no need for any interventions in the future.  To come back and see me for any worsening pain swelling or issues.  Will be discharged from clinic.  All questions answered.

## 2025-02-05 ENCOUNTER — OFFICE VISIT (OUTPATIENT)
Dept: FAMILY MEDICINE | Facility: CLINIC | Age: 50
End: 2025-02-05
Payer: COMMERCIAL

## 2025-02-05 VITALS
OXYGEN SATURATION: 97 % | WEIGHT: 257.38 LBS | SYSTOLIC BLOOD PRESSURE: 130 MMHG | BODY MASS INDEX: 45.61 KG/M2 | DIASTOLIC BLOOD PRESSURE: 80 MMHG | HEIGHT: 63 IN | TEMPERATURE: 98 F | HEART RATE: 66 BPM | RESPIRATION RATE: 18 BRPM

## 2025-02-05 DIAGNOSIS — I10 PRIMARY HYPERTENSION: Chronic | ICD-10-CM

## 2025-02-05 PROCEDURE — 3008F BODY MASS INDEX DOCD: CPT | Mod: CPTII,,, | Performed by: NURSE PRACTITIONER

## 2025-02-05 PROCEDURE — 3079F DIAST BP 80-89 MM HG: CPT | Mod: CPTII,,, | Performed by: NURSE PRACTITIONER

## 2025-02-05 PROCEDURE — 99213 OFFICE O/P EST LOW 20 MIN: CPT | Mod: ,,, | Performed by: NURSE PRACTITIONER

## 2025-02-05 PROCEDURE — 1159F MED LIST DOCD IN RCRD: CPT | Mod: CPTII,,, | Performed by: NURSE PRACTITIONER

## 2025-02-05 PROCEDURE — 4010F ACE/ARB THERAPY RXD/TAKEN: CPT | Mod: CPTII,,, | Performed by: NURSE PRACTITIONER

## 2025-02-05 PROCEDURE — 3075F SYST BP GE 130 - 139MM HG: CPT | Mod: CPTII,,, | Performed by: NURSE PRACTITIONER

## 2025-02-05 RX ORDER — VALSARTAN 160 MG/1
160 TABLET ORAL DAILY
Qty: 90 TABLET | Refills: 3 | Status: SHIPPED | OUTPATIENT
Start: 2025-02-05 | End: 2026-02-05

## 2025-02-05 NOTE — PROGRESS NOTES
SUNI Cruz   [facility]  [address]   [place] [address]     PATIENT NAME: Glendy Engle  : 1975  DATE: 25  MRN: [id number]      Billing Provider: SUNI Cruz  Level of Service: [place] OFFICE/OUTPT VISIT, EST, LEVL III, 20-29 MIN  Patient PCP Information       Provider PCP Type    SUNI Cruz General              Reason for Visit / Chief Complaint: Follow-up (2 month follow up) and Hypertension (Following up on HTN. Forgot the BP log at home but she says they have been doing much better.)       History of Present Illness / Problem Focused Workflow     History of Present Illness    CHIEF COMPLAINT:  Patient presents today for blood pressure follow up.    HYPERTENSION:  She uses a wrist blood pressure monitor at home for self-monitoring. Blood pressure was elevated during initial office check. She continues Valsartan 160 mg.    MUSCULOSKELETAL:  She reports bilateral knee pain that is exacerbated by prolonged driving. She also notes lower extremity swelling has returned, which she attributes to dry bed conditions and working in the chicken house. The swelling was not present during her recent trip.      ROS:  General: -fever, -chills, -fatigue, -weight gain, -weight loss  Eyes: -vision changes, -redness, -discharge  ENT: -ear pain, -nasal congestion, -sore throat  Cardiovascular: -chest pain, -palpitations, -lower extremity edema  Respiratory: -cough, -shortness of breath  Gastrointestinal: -abdominal pain, -nausea, -vomiting, -diarrhea, -constipation, -blood in stool  Genitourinary: -dysuria, -hematuria, -frequency  Musculoskeletal: +joint pain, -muscle pain  Skin: -rash, -lesion  Neurological: -headache, -dizziness, -numbness, -tingling  Psychiatric: -anxiety, -depression, -sleep difficulty           Medical / Social / Family History     Past Medical History:   Diagnosis Date    Abnormal LFTs 2024    Abnormal pulmonary function test 2019    Abnormal radiograph      Abnormal thyroid blood test 12/01/2016    Anxiety 10/07/2024    Asthma     BMI 45.0-49.9, adult     Dysphagia 10/10/2019    Exposure to severe acute respiratory syndrome coronavirus 2 (SARS-CoV-2) 04/02/2021    GERD (gastroesophageal reflux disease)     Hypertension     Mixed stress and urge urinary incontinence 07/27/2022    SHERLYN on CPAP     Thyroid nodule        Past Surgical History:   Procedure Laterality Date    CHOLECYSTECTOMY      COLONOSCOPY      ESOPHAGOGASTRODUODENOSCOPY      HYSTERECTOMY      LEG SURGERY      vein surgery on right leg    REDUCTION OF BOTH BREASTS      VASCULAR SURGERY      Rt GSV Laser Ablation Phlebectomy: Dr. Sergey Esteves  2017       Social History  Ms.  reports that she has never smoked. She has never been exposed to tobacco smoke. She has never used smokeless tobacco. She reports that she does not currently use alcohol. She reports that she does not use drugs.    Family History  Ms.'s family history includes Cancer in her father and maternal aunt; Diabetes in her maternal grandmother and paternal grandmother; Heart disease in her mother and paternal grandfather; Heart failure in her mother; Hypertension in her mother.    Medications and Allergies     Medications  Outpatient Medications Marked as Taking for the 2/5/25 encounter (Office Visit) with Kelli Paez FNP   Medication Sig Dispense Refill    albuterol (PROVENTIL/VENTOLIN HFA) 90 mcg/actuation inhaler Inhale 2 puffs into the lungs every 6 (six) hours as needed for Wheezing. Rescue 18 g 5    budesonide-formoterol 160-4.5 mcg (SYMBICORT) 160-4.5 mcg/actuation HFAA Inhale 2 puffs into the lungs every 12 (twelve) hours. Controller 10.2 g 5    cetirizine (ZYRTEC) 10 MG tablet Take 1 tablet (10 mg total) by mouth once daily. 90 tablet 1    cyclobenzaprine (FLEXERIL) 10 MG tablet Take 10 mg by mouth 3 (three) times daily as needed for Muscle spasms.      famotidine (PEPCID) 20 MG tablet Take 1 tablet (20 mg total) by mouth every evening. 30  "tablet 5    HYDROcodone-acetaminophen (NORCO)  mg per tablet Take 1 tablet by mouth 2 (two) times daily.      metoprolol succinate (TOPROL-XL) 25 MG 24 hr tablet Take 1 tablet (25 mg total) by mouth once daily. 90 tablet 3    pantoprazole (PROTONIX) 40 MG tablet Take 1 tablet (40 mg total) by mouth once daily. 90 tablet 1    paroxetine (PAXIL) 20 MG tablet Take 1 tablet (20 mg total) by mouth every evening. 90 tablet 1    simvastatin (ZOCOR) 40 MG tablet Take 1 tablet (40 mg total) by mouth every evening. 90 tablet 3    [DISCONTINUED] metFORMIN (GLUCOPHAGE) 500 MG tablet Take 1 tablet (500 mg total) by mouth 2 (two) times daily with meals. 180 tablet 1    [DISCONTINUED] valsartan (DIOVAN) 160 MG tablet Take 1 tablet (160 mg total) by mouth once daily. 30 tablet 1       Allergies  Review of patient's allergies indicates:   Allergen Reactions    Sulfa (sulfonamide antibiotics) Hives    Bactrim [sulfamethoxazole-trimethoprim] Hives       Physical Examination     Vitals:    02/05/25 1455 02/05/25 1518   BP: (!) 148/84 130/80   BP Location:  Right arm   Patient Position:  Sitting   Pulse: 66    Resp: 18    Temp: 98.4 °F (36.9 °C)    TempSrc: Oral    SpO2: 97%    Weight: 116.8 kg (257 lb 6.4 oz)    Height: 5' 3" (1.6 m)         Physical Exam    General: No acute distress. Well-developed. Well-nourished.  Eyes: EOMI. Sclerae anicteric.  HENT: Normocephalic. Atraumatic. Nares patent. Moist oral mucosa.  Ears: Bilateral TMs clear. Bilateral EACs clear.  Cardiovascular: Regular rate. Regular rhythm. No murmurs. No rubs. No gallops. Normal S1, S2.  Respiratory: Normal respiratory effort. Clear to auscultation bilaterally. No rales. No rhonchi. No wheezing.  Abdomen: Soft. Non-tender. Non-distended. Normoactive bowel sounds.  Musculoskeletal: No  obvious deformity.  Extremities: No lower extremity edema.  Neurological: Alert & oriented x3. No slurred speech. Normal gait.  Psychiatric: Normal mood. Normal affect. Good " insight. Good judgment.  Skin: Warm. Dry. No rash.       Physical Exam     Assessment and Plan (including Health Maintenance)     :Assessment & Plan    IMPRESSION:  - Evaluated blood pressure management, noting improvement with new wrist monitor  - Assessed knee pain, likely exacerbated by recent travel and climbing stairs  - Reviewed medication regimen, focusing on Valsartan for blood pressure control  - Monitored for recurrence of edema, noting its return possibly due to environmental factors    HYPERTENSION:  - Monitored the patient's blood pressure using a new wrist monitor at home.  - Evaluated blood pressure at the clinic, which was initially high.  - Explained proper use of wrist blood pressure monitor, emphasizing importance of correct positioning for accurate readings.  - Continued Valsartan 160 mg daily for hypertension management.  - Refilled Valsartan prescription.    KNEE PAIN:  - Monitored the patient's knee pain, which is reported after driving.  - Evaluated current knee pain as reported by the patient.    GERD:  - Discussed OTC availability of Famotidine as an alternative to prescription.  - Recommend purchasing OTC Famotidine 20 mg to be taken at night, as an alternative to prescription due to prior authorization issues.  - Instructed the patient to inquire at [facility] pharmacy about prior authorization status for Famotidine.  - Noted that the patient is currently taking Famotidine for GERD, which requires prior authorization.    WEIGHT MANAGEMENT:  - Monitored the patient's mobility, noting difficulty climbing stairs due to obesity.  - Monitored the patient's mobility, noting experienced difficulty climbing stairs.             Problem List Items Addressed This Visit       Hypertension (Chronic)   .      Health Maintenance Due   Topic Date Due    Mammogram  01/17/2025     Health Maintenance Topics with due status: Not Due       Topic Last Completion Date    TETANUS VACCINE 05/30/2017    Colorectal  Cancer Screening 09/14/2023    Foot Exam 08/08/2024    Hemoglobin A1c 09/30/2024    Diabetic Eye Exam 10/22/2024    Diabetes Urine Screening 11/19/2024    Lipid Panel 11/19/2024    Low Dose Statin 02/05/2025    RSV Vaccine (Age 60+ and Pregnant patients) Not Due       Procedures     Future Appointments   Date Time Provider Department Center   11/20/2025  9:20 AM [facility] [place]        No follow-ups on file.     Signature:  SUNI Cruz    Date of encounter: 2/5/25      This note was generated with the assistance of ambient listening technology. Verbal consent was obtained by the patient and accompanying visitor(s) for the recording of patient appointment to facilitate this note. I attest to having reviewed and edited the generated note for accuracy, though some syntax or spelling errors may persist. Please contact the author of this note for any clarification.

## 2025-02-06 DIAGNOSIS — E11.9 DIABETES MELLITUS WITHOUT COMPLICATION: Chronic | ICD-10-CM

## 2025-02-06 NOTE — TELEPHONE ENCOUNTER
----- Message from Angela sent at 2/6/2025 12:49 PM CST -----  Regarding: REFILL  metFORMIN (GLUCOPHAGE) 500 MG tablet       [facility]  [place]

## 2025-02-07 RX ORDER — METFORMIN HYDROCHLORIDE 500 MG/1
500 TABLET ORAL 2 TIMES DAILY WITH MEALS
Qty: 180 TABLET | Refills: 1 | Status: SHIPPED | OUTPATIENT
Start: 2025-02-07

## 2025-02-25 ENCOUNTER — OFFICE VISIT (OUTPATIENT)
Dept: FAMILY MEDICINE | Facility: CLINIC | Age: 50
End: 2025-02-25
Payer: COMMERCIAL

## 2025-02-25 VITALS
WEIGHT: 262.19 LBS | SYSTOLIC BLOOD PRESSURE: 140 MMHG | DIASTOLIC BLOOD PRESSURE: 83 MMHG | HEIGHT: 63 IN | RESPIRATION RATE: 18 BRPM | HEART RATE: 71 BPM | BODY MASS INDEX: 46.46 KG/M2 | TEMPERATURE: 98 F | OXYGEN SATURATION: 97 %

## 2025-02-25 DIAGNOSIS — J02.9 PHARYNGITIS, UNSPECIFIED ETIOLOGY: Primary | ICD-10-CM

## 2025-02-25 DIAGNOSIS — K21.00 GASTROESOPHAGEAL REFLUX DISEASE WITH ESOPHAGITIS WITHOUT HEMORRHAGE: Chronic | ICD-10-CM

## 2025-02-25 PROBLEM — E78.00 PURE HYPERCHOLESTEROLEMIA: Status: ACTIVE | Noted: 2024-10-07

## 2025-02-25 LAB
CTP QC/QA: YES
MOLECULAR STREP A: NEGATIVE

## 2025-02-25 PROCEDURE — 3008F BODY MASS INDEX DOCD: CPT | Mod: CPTII,,, | Performed by: NURSE PRACTITIONER

## 2025-02-25 PROCEDURE — 87651 STREP A DNA AMP PROBE: CPT | Mod: QW,,, | Performed by: NURSE PRACTITIONER

## 2025-02-25 PROCEDURE — 4010F ACE/ARB THERAPY RXD/TAKEN: CPT | Mod: CPTII,,, | Performed by: NURSE PRACTITIONER

## 2025-02-25 PROCEDURE — 99214 OFFICE O/P EST MOD 30 MIN: CPT | Mod: ,,, | Performed by: NURSE PRACTITIONER

## 2025-02-25 PROCEDURE — 3077F SYST BP >= 140 MM HG: CPT | Mod: CPTII,,, | Performed by: NURSE PRACTITIONER

## 2025-02-25 PROCEDURE — 3079F DIAST BP 80-89 MM HG: CPT | Mod: CPTII,,, | Performed by: NURSE PRACTITIONER

## 2025-02-25 PROCEDURE — 1159F MED LIST DOCD IN RCRD: CPT | Mod: CPTII,,, | Performed by: NURSE PRACTITIONER

## 2025-02-25 RX ORDER — FAMOTIDINE 20 MG/1
20 TABLET, FILM COATED ORAL NIGHTLY
Qty: 90 TABLET | Refills: 1 | Status: SHIPPED | OUTPATIENT
Start: 2025-02-25 | End: 2026-02-25

## 2025-02-25 NOTE — PROGRESS NOTES
SUNI Cruz   [facility]  [address] [address]   [place] [address]     PATIENT NAME: Glendy Engle  : 1975  DATE: 25  MRN: [id number]      Billing Provider: SUNI Cruz  Level of Service: [facility] OFFICE/OUTPT VISIT, EST, LEVL IV, 30-39 MIN  Patient PCP Information       Provider PCP Type    SUNI Cruz General              Reason for Visit / Chief Complaint: Sore Throat and Hypertension (Pt brought her BP log)       History of Present Illness / Problem Focused Workflow     History of Present Illness    CHIEF COMPLAINT:  Patient presents today for throat discomfort    THROAT AND RESPIRATORY SYMPTOMS:  She reports throat discomfort with a sensation of something stuck in her throat accompanied by pain. Her voice has been scratchy, and she experiences pain with deep breathing. She also reports feeling cold and unable to get warm.    GERD:  She reports worsening acid reflux symptoms. She takes Pepcid nightly and Protonix in the morning for management.    HYPERTENSION:  She reports fluctuating blood pressure readings ranging from 126-168 systolic, with several readings of 159 and 161. She takes Valsartan 160 mg and Metoprolol 25 mg for management.    GASTROINTESTINAL:  She reports nausea.           Medical / Social / Family History     Past Medical History:   Diagnosis Date    Abnormal LFTs 2024    Abnormal pulmonary function test 2019    Abnormal radiograph     Abnormal thyroid blood test 2016    Anxiety 10/07/2024    Asthma     BMI 45.0-49.9, adult     Dysphagia 10/10/2019    Exposure to severe acute respiratory syndrome coronavirus 2 (SARS-CoV-2) 2021    GERD (gastroesophageal reflux disease)     Hypertension     Mixed stress and urge urinary incontinence 2022    SHERLYN on CPAP     Thyroid nodule        Past Surgical History:   Procedure Laterality Date    CHOLECYSTECTOMY      COLONOSCOPY      ESOPHAGOGASTRODUODENOSCOPY      HYSTERECTOMY      LEG SURGERY      vein  surgery on right leg    REDUCTION OF BOTH BREASTS      VASCULAR SURGERY      Rt GSV Laser Ablation Phlebectomy: Dr. Sergey Esteves  2017       Social History  Ms.  reports that she has never smoked. She has never been exposed to tobacco smoke. She has never used smokeless tobacco. She reports that she does not currently use alcohol. She reports that she does not use drugs.    Family History  Ms.'s family history includes Cancer in her father and maternal aunt; Diabetes in her maternal grandmother and paternal grandmother; Heart disease in her mother and paternal grandfather; Heart failure in her mother; Hypertension in her mother.    Medications and Allergies     Medications  Outpatient Medications Marked as Taking for the 2/25/25 encounter (Office Visit) with Kelli Paez FNP   Medication Sig Dispense Refill    albuterol (PROVENTIL/VENTOLIN HFA) 90 mcg/actuation inhaler Inhale 2 puffs into the lungs every 6 (six) hours as needed for Wheezing. Rescue 18 g 5    budesonide-formoterol 160-4.5 mcg (SYMBICORT) 160-4.5 mcg/actuation HFAA Inhale 2 puffs into the lungs every 12 (twelve) hours. Controller 10.2 g 5    cetirizine (ZYRTEC) 10 MG tablet Take 1 tablet (10 mg total) by mouth once daily. 90 tablet 1    cyclobenzaprine (FLEXERIL) 10 MG tablet Take 10 mg by mouth 3 (three) times daily as needed for Muscle spasms.      HYDROcodone-acetaminophen (NORCO)  mg per tablet Take 1 tablet by mouth 2 (two) times daily.      metFORMIN (GLUCOPHAGE) 500 MG tablet Take 1 tablet (500 mg total) by mouth 2 (two) times daily with meals. 180 tablet 1    metoprolol succinate (TOPROL-XL) 25 MG 24 hr tablet Take 1 tablet (25 mg total) by mouth once daily. 90 tablet 3    pantoprazole (PROTONIX) 40 MG tablet Take 1 tablet (40 mg total) by mouth once daily. 90 tablet 1    paroxetine (PAXIL) 20 MG tablet Take 1 tablet (20 mg total) by mouth every evening. 90 tablet 1    simvastatin (ZOCOR) 40 MG tablet Take 1 tablet (40 mg total) by mouth  "every evening. 90 tablet 3    valsartan (DIOVAN) 160 MG tablet Take 1 tablet (160 mg total) by mouth once daily. 90 tablet 3    [DISCONTINUED] famotidine (PEPCID) 20 MG tablet Take 1 tablet (20 mg total) by mouth every evening. 30 tablet 5       Allergies  Review of patient's allergies indicates:   Allergen Reactions    Sulfa (sulfonamide antibiotics) Hives    Bactrim [sulfamethoxazole-trimethoprim] Hives       Physical Examination     Vitals:    02/25/25 1331 02/25/25 1334   BP: (!) 150/87 (!) 140/83   Pulse: 71    Resp: 18    Temp: 98.3 °F (36.8 °C)    TempSrc: Oral    SpO2: 97%    Weight: 118.9 kg (262 lb 3.2 oz)    Height: 5' 3" (1.6 m)         Physical Exam    Vitals: Blood pressure readings: 168, 159, 161, 130s, 126 systolic.  General: No acute distress. Well-developed. Well-nourished.  Eyes: EOMI. Sclerae anicteric.  HENT: Normocephalic. Atraumatic. Nares patent. Moist oral mucosa. Pharyngeal erythema.  Ears: Bilateral TMs clear. Bilateral EACs clear.  Cardiovascular: Regular rate. Regular rhythm. No murmurs. No rubs. No gallops. Normal S1, S2.  Respiratory: Normal respiratory effort. Clear to auscultation bilaterally. No rales. No rhonchi. No wheezing.  Abdomen: Soft. Non-tender. Non-distended. Normoactive bowel sounds.  Musculoskeletal: No  obvious deformity.  Extremities: No lower extremity edema.  Neurological: Alert & oriented x3. No slurred speech. Normal gait.  Psychiatric: Normal mood. Normal affect. Good insight. Good judgment.  Skin: Warm. Dry. No rash.  Neck: All normal.       Physical Exam     Assessment and Plan (including Health Maintenance)     :Assessment & Plan    IMPRESSION:  - Assessed throat discomfort, likely due to acid reflux exacerbation  - Ruled out strep throat based on physical exam findings  - Evaluated blood pressure control, noting recent elevations above target range  - Will increase metoprolol dose to address blood pressure concerns  - Recommend intensifying acid reflux " treatment with increased pantoprazole dosing    ACID REFLUX:  - Explained the connection between acid reflux and throat discomfort to the patient.  - Increased pantoprazole (Protonix) to 2 times daily for 5 days.  - Continued Famotidine (Pepcid) daily at night.  - Refilled Famotidine prescription.  - Observed redness in the posterior pharynx, likely due to reflux.  - Assessed that the throat irritation is likely caused by acid reflux rather than streptococcal pharyngitis.  - Recommend consuming a teaspoon of honey for throat comfort and avoiding spicy and greasy foods.  - Focused treatment on managing acid reflux, which is believed to be the cause of throat symptoms.  - Noted the patient's report of feeling nauseated.  - Focused on managing acid reflux, which may be contributing to nausea, rather than prescribing specific treatment for nausea.  - Discussed dietary modifications to manage acid reflux symptoms.  - Patient to avoid spicy and greasy foods to prevent worsening of acid reflux.    HYPERTENSION:  - Instructed the patient to continue monitoring and logging blood pressure readings.  - Increased metoprolol from 25 mg to 50 mg daily.  - Continued valsartan 160 mg daily.  - Requested the patient to send blood pressure log back to the office via "[employer]" or bring to [facility].  - Evaluated that blood pressure is still running higher than desired, with home readings ranging from 126 to 168 systolic.  - Acknowledged that blood pressure is not well-controlled and decided to adjust medication.  - Requested the patient to report back in 2 weeks with updated blood pressure readings.    THROAT IRRITATION:  - Advised the patient to consider using honey for throat comfort.  - Examined the throat and observed erythema, but no signs of streptococcal pharyngitis.  - Noted absence of cervical lymphadenopathy.  - Assessed that the throat irritation is likely caused by acid reflux rather than pharyngitis.    FOLLOW UP:  - Follow up in  a few weeks.  - Contact the office if symptoms worsen or persist.             Problem List Items Addressed This Visit    None  Visit Diagnoses         Pharyngitis, unspecified etiology    -  Primary        .      Health Maintenance Due   Topic Date Due    Mammogram  01/17/2025     Health Maintenance Topics with due status: Not Due       Topic Last Completion Date    TETANUS VACCINE 05/30/2017    Colorectal Cancer Screening 09/14/2023    Foot Exam 08/08/2024    Hemoglobin A1c 09/30/2024    Diabetic Eye Exam 10/22/2024    Diabetes Urine Screening 11/19/2024    Lipid Panel 11/19/2024    Low Dose Statin 02/05/2025    RSV Vaccine (Age 60+ and Pregnant patients) Not Due       Procedures     Future Appointments   Date Time Provider Department Center   2/25/2025  3:20 PM Kelli Paez FNP [facility]   11/20/2025  9:20 AM Philippe, Kelli, FNP [facility] [place]        No follow-ups on file.     Signature:  SUNI Cruz    Date of encounter: 2/25/25      This note was generated with the assistance of ambient listening technology. Verbal consent was obtained by the patient and accompanying visitor(s) for the recording of patient appointment to facilitate this note. I attest to having reviewed and edited the generated note for accuracy, though some syntax or spelling errors may persist. Please contact the author of this note for any clarification.

## 2025-03-03 ENCOUNTER — TELEPHONE (OUTPATIENT)
Dept: FAMILY MEDICINE | Facility: CLINIC | Age: 50
End: 2025-03-03
Payer: COMMERCIAL

## 2025-03-03 DIAGNOSIS — Z12.31 ENCOUNTER FOR SCREENING MAMMOGRAM FOR MALIGNANT NEOPLASM OF BREAST: Primary | ICD-10-CM

## 2025-03-03 NOTE — TELEPHONE ENCOUNTER
----- Message from Ronna sent at 3/3/2025  9:03 AM CST -----  Needs a mammogram scheduled at [facility] when possible [phone]

## 2025-03-20 ENCOUNTER — DOCUMENTATION ONLY (OUTPATIENT)
Dept: FAMILY MEDICINE | Facility: CLINIC | Age: 50
End: 2025-03-20
Payer: COMMERCIAL

## 2025-03-20 NOTE — PROGRESS NOTES
Pt with persistently elevated blood pressure on Valsartan 160mg and metoprolol 25mg--advised  on 2/25/25 to increase metoprolol to 50mg and conttijue valsartan 160mg.    3week bp log shows some decline finally occurring with readings down to 136/80 and 135/85 and heart rate has been stale.   Will advise to continue with valsartan 160mg daily along with metoprolol 50mg daily and keep checking bp additional 2 weeks and record and bring me log for further reviieew/tm

## 2025-03-27 LAB — BCS RECOMMENDATION EXT: NORMAL

## 2025-04-08 DIAGNOSIS — I10 PRIMARY HYPERTENSION: Chronic | ICD-10-CM

## 2025-04-08 RX ORDER — METOPROLOL SUCCINATE 50 MG/1
50 TABLET, EXTENDED RELEASE ORAL DAILY
Qty: 90 TABLET | Refills: 1 | Status: SHIPPED | OUTPATIENT
Start: 2025-04-08

## 2025-04-08 NOTE — TELEPHONE ENCOUNTER
----- Message from Ronna sent at 4/8/2025  3:01 PM CDT -----  Needs refills on metoprolol called in to [facility] pharmacy in [place]

## 2025-04-08 NOTE — TELEPHONE ENCOUNTER
Last OV - 2/25/25  Last labs - 11/19/24    Metoprolol dose was increased to 50 mg daily on 2/25/25.

## 2025-04-11 ENCOUNTER — PATIENT OUTREACH (OUTPATIENT)
Facility: HOSPITAL | Age: 50
End: 2025-04-11
Payer: COMMERCIAL

## 2025-05-05 DIAGNOSIS — F41.1 GENERALIZED ANXIETY DISORDER: Chronic | ICD-10-CM

## 2025-05-05 RX ORDER — PAROXETINE HYDROCHLORIDE 20 MG/1
20 TABLET, FILM COATED ORAL NIGHTLY
Qty: 90 TABLET | Refills: 1 | Status: SHIPPED | OUTPATIENT
Start: 2025-05-05

## 2025-05-05 NOTE — TELEPHONE ENCOUNTER
----- Message from Ronna sent at 5/5/2025  8:23 AM CDT -----  Needs refills on paroxetine called in to [facility]

## 2025-05-05 NOTE — TELEPHONE ENCOUNTER
Last OV - 2/25/25  Last labs - 11/19/24    Result note: Notify Crystal that  her urine test  shows kidney function is stable.   Cholesterol is good.  Electrolytes are all normal /tm

## 2025-05-20 ENCOUNTER — OFFICE VISIT (OUTPATIENT)
Dept: FAMILY MEDICINE | Facility: CLINIC | Age: 50
End: 2025-05-20
Payer: COMMERCIAL

## 2025-05-20 VITALS — SYSTOLIC BLOOD PRESSURE: 150 MMHG | DIASTOLIC BLOOD PRESSURE: 70 MMHG

## 2025-05-20 DIAGNOSIS — M17.11 PRIMARY OSTEOARTHRITIS OF RIGHT KNEE: Primary | ICD-10-CM

## 2025-05-20 DIAGNOSIS — M71.22 BAKER'S CYST OF KNEE, LEFT: ICD-10-CM

## 2025-05-20 DIAGNOSIS — F41.1 GENERALIZED ANXIETY DISORDER: Chronic | ICD-10-CM

## 2025-05-20 PROCEDURE — 3078F DIAST BP <80 MM HG: CPT | Mod: CPTII,,, | Performed by: NURSE PRACTITIONER

## 2025-05-20 PROCEDURE — 3077F SYST BP >= 140 MM HG: CPT | Mod: CPTII,,, | Performed by: NURSE PRACTITIONER

## 2025-05-20 PROCEDURE — 99214 OFFICE O/P EST MOD 30 MIN: CPT | Mod: ,,, | Performed by: NURSE PRACTITIONER

## 2025-05-20 PROCEDURE — 4010F ACE/ARB THERAPY RXD/TAKEN: CPT | Mod: CPTII,,, | Performed by: NURSE PRACTITIONER

## 2025-05-20 PROCEDURE — 1159F MED LIST DOCD IN RCRD: CPT | Mod: CPTII,,, | Performed by: NURSE PRACTITIONER

## 2025-05-20 RX ORDER — METOPROLOL SUCCINATE 25 MG/1
25 TABLET, EXTENDED RELEASE ORAL DAILY
Qty: 90 TABLET | Refills: 1 | Status: SHIPPED | OUTPATIENT
Start: 2025-05-20

## 2025-05-20 RX ORDER — CETIRIZINE HYDROCHLORIDE 10 MG/1
10 TABLET ORAL DAILY
Qty: 90 TABLET | Refills: 1 | Status: SHIPPED | OUTPATIENT
Start: 2025-05-20 | End: 2026-05-20

## 2025-05-20 RX ORDER — BUSPIRONE HYDROCHLORIDE 5 MG/1
5 TABLET ORAL 3 TIMES DAILY PRN
COMMUNITY

## 2025-05-20 RX ORDER — PAROXETINE 20 MG/1
20 TABLET, FILM COATED ORAL NIGHTLY
Qty: 90 TABLET | Refills: 1 | Status: SHIPPED | OUTPATIENT
Start: 2025-05-20

## 2025-05-20 RX ORDER — FAMOTIDINE 20 MG/1
20 TABLET, FILM COATED ORAL NIGHTLY
Qty: 90 TABLET | Refills: 1 | Status: SHIPPED | OUTPATIENT
Start: 2025-05-20 | End: 2026-05-20

## 2025-05-20 RX ORDER — METOPROLOL SUCCINATE 25 MG/1
25 TABLET, EXTENDED RELEASE ORAL DAILY
COMMUNITY
End: 2025-05-20

## 2025-05-20 RX ORDER — PANTOPRAZOLE SODIUM 40 MG/1
40 TABLET, DELAYED RELEASE ORAL DAILY
Qty: 90 TABLET | Refills: 1 | Status: SHIPPED | OUTPATIENT
Start: 2025-05-20

## 2025-05-20 NOTE — PROGRESS NOTES
SUNI Cruz   [facility]  [address] [address]   [place] [address]     PATIENT NAME: Glendy nEgle  : 1975  DATE: 25  MRN: [id number]      Billing Provider: SUNI Cruz  Level of Service: [facility] OFFICE/OUTPT VISIT, EST, LEVL IV, 30-39 MIN  Patient PCP Information       Provider PCP Type    SUNI Cruz General              Reason for Visit / Chief Complaint: Leg Pain (Right hip pain radiating to the back of right knee/leg and further down leg.) and Health Maintenance (Hemoglobin A1c due on 2025 )       History of Present Illness / Problem Focused Workflow     History of Present Illness                Medical / Social / Family History     Past Medical History:   Diagnosis Date    Abnormal LFTs 2024    Abnormal pulmonary function test 2019    Abnormal radiograph     Abnormal thyroid blood test 2016    Anxiety 10/07/2024    Asthma     BMI 45.0-49.9, adult     Dysphagia 10/10/2019    Exposure to severe acute respiratory syndrome coronavirus 2 (SARS-CoV-2) 2021    GERD (gastroesophageal reflux disease)     Hypertension     Mixed stress and urge urinary incontinence 2022    SHERLYN on CPAP     Thyroid nodule        Past Surgical History:   Procedure Laterality Date    CHOLECYSTECTOMY      COLONOSCOPY      ESOPHAGOGASTRODUODENOSCOPY      HYSTERECTOMY      LEG SURGERY      vein surgery on right leg    REDUCTION OF BOTH BREASTS      VASCULAR SURGERY      Rt GSV Laser Ablation Phlebectomy: Dr. Sergey Esteves         Social History  Ms.  reports that she has never smoked. She has never been exposed to tobacco smoke. She has never used smokeless tobacco. She reports that she does not currently use alcohol. She reports that she does not use drugs.    Family History  Ms.'s family history includes Cancer in her father and maternal aunt; Diabetes in her maternal grandmother and paternal grandmother; Heart disease in her mother and paternal grandfather; Heart failure in her  mother; Hypertension in her mother.    Medications and Allergies     Medications  Outpatient Medications Marked as Taking for the 5/20/25 encounter (Office Visit) with Kelli Paez FNP   Medication Sig Dispense Refill    albuterol (PROVENTIL/VENTOLIN HFA) 90 mcg/actuation inhaler Inhale 2 puffs into the lungs every 6 (six) hours as needed for Wheezing. Rescue 18 g 5    budesonide-formoterol 160-4.5 mcg (SYMBICORT) 160-4.5 mcg/actuation HFAA Inhale 2 puffs into the lungs every 12 (twelve) hours. Controller 10.2 g 5    cyclobenzaprine (FLEXERIL) 10 MG tablet Take 10 mg by mouth 3 (three) times daily as needed for Muscle spasms.      HYDROcodone-acetaminophen (NORCO)  mg per tablet Take 1 tablet by mouth 2 (two) times daily.      metFORMIN (GLUCOPHAGE) 500 MG tablet Take 1 tablet (500 mg total) by mouth 2 (two) times daily with meals. 180 tablet 1    simvastatin (ZOCOR) 40 MG tablet Take 1 tablet (40 mg total) by mouth every evening. 90 tablet 3    valsartan (DIOVAN) 160 MG tablet Take 1 tablet (160 mg total) by mouth once daily. 90 tablet 3    [DISCONTINUED] cetirizine (ZYRTEC) 10 MG tablet Take 1 tablet (10 mg total) by mouth once daily. 90 tablet 1    [DISCONTINUED] famotidine (PEPCID) 20 MG tablet Take 1 tablet (20 mg total) by mouth every evening. 90 tablet 1    [DISCONTINUED] metoprolol succinate (TOPROL-XL) 25 MG 24 hr tablet Take 25 mg by mouth once daily.      [DISCONTINUED] pantoprazole (PROTONIX) 40 MG tablet Take 1 tablet (40 mg total) by mouth once daily. 90 tablet 1    [DISCONTINUED] paroxetine (PAXIL) 20 MG tablet Take 1 tablet (20 mg total) by mouth every evening. 90 tablet 1       Allergies  Review of patient's allergies indicates:   Allergen Reactions    Sulfa (sulfonamide antibiotics) Hives    Bactrim [sulfamethoxazole-trimethoprim] Hives       Physical Examination     Vitals:    05/20/25 1539 05/20/25 1609   BP: (!) 161/75 (!) 150/70   BP Location:  Right arm   Patient Position:  Sitting     "    Physical Exam            Physical Exam     Laboratory     Lab Results   Component Value Date    GLU 99 11/19/2024     11/19/2024    K 4.0 11/19/2024     11/19/2024    CO2 29 11/19/2024    BUN 11 11/19/2024    CREATININE 0.73 11/19/2024    CALCIUM 9.0 11/19/2024    PROT 7.8 08/08/2024    ALBUMIN 3.9 08/08/2024    BILITOT 0.7 08/08/2024    ALKPHOS 126 (H) 08/08/2024    AST 48 (H) 08/08/2024    ALT 49 08/08/2024    ANIONGAP 13 11/19/2024    EGFRNONAA 75 07/13/2021       Lab Results   Component Value Date    WBC 8.33 08/08/2024    RBC 4.76 08/08/2024    HGB 14.9 08/08/2024    HCT 44.3 08/08/2024    MCV 93.1 08/08/2024    RDW 12.6 08/08/2024     (L) 08/08/2024        Lab Results   Component Value Date    CHOL 146 11/19/2024    TRIG 103 11/19/2024    HDL 50 11/19/2024    LDLCALC 75 11/19/2024       Lab Results   Component Value Date    TSH 1.690 07/12/2023       Lab Results   Component Value Date    HGBA1C 5.5 09/30/2024    ESTIMATEDAVG 108 08/08/2024        Lab Results   Component Value Date    PIRPUWVD45 521 11/15/2022       No results found for: "EPIKQYCQ44VL"    No results found for: "PSA"    Assessment and Plan (including Health Maintenance)     :Assessment & Plan                  Problem List Items Addressed This Visit    None  Visit Diagnoses         Generalized anxiety disorder  (Chronic)           .      Health Maintenance Due   Topic Date Due    Hemoglobin A1c  03/30/2025     Health Maintenance Topics with due status: Not Due       Topic Last Completion Date    TETANUS VACCINE 05/30/2017    Colorectal Cancer Screening 09/14/2023    Foot Exam 08/08/2024    Diabetic Eye Exam 10/22/2024    Diabetes Urine Screening 11/19/2024    Lipid Panel 11/19/2024    Low Dose Statin 02/25/2025    Mammogram 03/27/2025    RSV Vaccine (Age 60+ and Pregnant patients) Not Due       Procedures     Future Appointments   Date Time Provider Department Center   11/20/2025  9:20 AM eKlli Paez FNP [facility]        No follow-ups on file.     Signature:  SUNI Crzu    Date of encounter: 5/20/25      This note was generated with the assistance of ambient listening technology. Verbal consent was obtained by the patient and accompanying visitor(s) for the recording of patient appointment to facilitate this note. I attest to having reviewed and edited the generated note for accuracy, though some syntax or spelling errors may persist. Please contact the author of this note for any clarification.

## 2025-05-21 ENCOUNTER — CLINICAL SUPPORT (OUTPATIENT)
Dept: FAMILY MEDICINE | Facility: CLINIC | Age: 50
End: 2025-05-21
Payer: COMMERCIAL

## 2025-05-21 ENCOUNTER — RESULTS FOLLOW-UP (OUTPATIENT)
Dept: FAMILY MEDICINE | Facility: CLINIC | Age: 50
End: 2025-05-21

## 2025-05-21 VITALS — DIASTOLIC BLOOD PRESSURE: 71 MMHG | SYSTOLIC BLOOD PRESSURE: 128 MMHG

## 2025-05-21 DIAGNOSIS — M17.11 PRIMARY OSTEOARTHRITIS OF RIGHT KNEE: Primary | ICD-10-CM

## 2025-05-21 DIAGNOSIS — I10 HYPERTENSION, UNSPECIFIED TYPE: Primary | ICD-10-CM

## 2025-06-04 ENCOUNTER — OFFICE VISIT (OUTPATIENT)
Dept: FAMILY MEDICINE | Facility: CLINIC | Age: 50
End: 2025-06-04
Payer: COMMERCIAL

## 2025-06-04 VITALS
RESPIRATION RATE: 18 BRPM | TEMPERATURE: 98 F | SYSTOLIC BLOOD PRESSURE: 136 MMHG | HEIGHT: 63 IN | DIASTOLIC BLOOD PRESSURE: 85 MMHG | OXYGEN SATURATION: 95 % | HEART RATE: 67 BPM | WEIGHT: 262.63 LBS | BODY MASS INDEX: 46.54 KG/M2

## 2025-06-04 DIAGNOSIS — M17.11 PRIMARY OSTEOARTHRITIS OF RIGHT KNEE: Primary | ICD-10-CM

## 2025-06-10 ENCOUNTER — OFFICE VISIT (OUTPATIENT)
Dept: FAMILY MEDICINE | Facility: CLINIC | Age: 50
End: 2025-06-10
Payer: COMMERCIAL

## 2025-06-10 VITALS
DIASTOLIC BLOOD PRESSURE: 78 MMHG | HEIGHT: 63 IN | SYSTOLIC BLOOD PRESSURE: 138 MMHG | HEART RATE: 72 BPM | TEMPERATURE: 98 F | OXYGEN SATURATION: 96 % | WEIGHT: 259.81 LBS | RESPIRATION RATE: 18 BRPM | BODY MASS INDEX: 46.04 KG/M2

## 2025-06-10 DIAGNOSIS — J30.9 ALLERGIC RHINITIS, UNSPECIFIED SEASONALITY, UNSPECIFIED TRIGGER: Primary | ICD-10-CM

## 2025-06-10 RX ORDER — ONDANSETRON 4 MG/1
4 TABLET, FILM COATED ORAL EVERY 8 HOURS PRN
Qty: 20 TABLET | Refills: 0 | Status: SHIPPED | OUTPATIENT
Start: 2025-06-10

## 2025-06-10 RX ORDER — MONTELUKAST SODIUM 10 MG/1
10 TABLET ORAL NIGHTLY
Qty: 30 TABLET | Refills: 0 | Status: SHIPPED | OUTPATIENT
Start: 2025-06-10 | End: 2025-07-10

## 2025-06-10 NOTE — PROGRESS NOTES
ARMOND Radford   [facility]  [address] [address]   [place] [address]     PATIENT NAME: Glendy Engle  : 1975  DATE: 6/10/25  MRN: [id number]      Billing Provider: ARMOND Radford  Level of Service:   Patient PCP Information       Provider PCP Type    SUNI Cruz [facility]            Reason for Visit / Chief Complaint: Nausea (N/V x 2 days; reports that it is worse when she gets up in the morning and when she lays down at night. )         History of Present Illness / Problem Focused Workflow     Glendy Engle is a 50 y.o. female presents to the clinic for nausea, congestion, sneezing, x2 days. Denies diarrhea. States she has taken pepto with mild relief of symptoms. She has a history of allergic rhinitis. Reports normally getting allergies this time of year. States she has taken claritin before for allergic rhinitis with no relief of symptoms. She has been on zyrtec for several years, but does not feel it is working anymore. Denies cough, sore throat, fever, or vomiting.      Review of Systems     Review of Systems   Constitutional:  Positive for appetite change. Negative for chills, fatigue and fever.   HENT:  Positive for nasal congestion and sneezing. Negative for ear discharge, ear pain, nosebleeds, sinus pressure/congestion and sore throat.    Gastrointestinal:  Positive for nausea. Negative for blood in stool, change in bowel habit, constipation, diarrhea and vomiting.      Medical / Social / Family History     Past Medical History:   Diagnosis Date    Abnormal LFTs 2024    Abnormal pulmonary function test 2019    Abnormal radiograph     Abnormal thyroid blood test 2016    Anxiety 10/07/2024    Asthma     BMI 45.0-49.9, adult     Dysphagia 10/10/2019    Exposure to severe acute respiratory syndrome coronavirus 2 (SARS-CoV-2) 2021    GERD (gastroesophageal reflux disease)     Hypertension     Mixed stress and urge urinary incontinence 2022     SHERLYN on CPAP     Thyroid nodule        Past Surgical History:   Procedure Laterality Date    CHOLECYSTECTOMY      COLONOSCOPY      ESOPHAGOGASTRODUODENOSCOPY      HYSTERECTOMY      LEG SURGERY      vein surgery on right leg    REDUCTION OF BOTH BREASTS      VASCULAR SURGERY      Rt GSV Laser Ablation Phlebectomy: Dr. Sergey Esteves  2017       Social History  Ms.  reports that she has never smoked. She has never been exposed to tobacco smoke. She has never used smokeless tobacco. She reports that she does not currently use alcohol. She reports that she does not use drugs.    Family History  Ms.'s family history includes Cancer in her father and maternal aunt; Diabetes in her maternal grandmother and paternal grandmother; Heart disease in her mother and paternal grandfather; Heart failure in her mother; Hypertension in her mother.    Medications and Allergies     Medications  Outpatient Medications Marked as Taking for the 6/10/25 encounter (Office Visit) with Chelsey Lima FNP-C   Medication Sig Dispense Refill    albuterol (PROVENTIL/VENTOLIN HFA) 90 mcg/actuation inhaler Inhale 2 puffs into the lungs every 6 (six) hours as needed for Wheezing. Rescue 18 g 5    budesonide-formoterol 160-4.5 mcg (SYMBICORT) 160-4.5 mcg/actuation HFAA Inhale 2 puffs into the lungs every 12 (twelve) hours. Controller 10.2 g 5    cetirizine (ZYRTEC) 10 MG tablet Take 1 tablet (10 mg total) by mouth once daily. 90 tablet 1    cyclobenzaprine (FLEXERIL) 10 MG tablet Take 10 mg by mouth 3 (three) times daily as needed for Muscle spasms.      famotidine (PEPCID) 20 MG tablet Take 1 tablet (20 mg total) by mouth every evening. 90 tablet 1    HYDROcodone-acetaminophen (NORCO)  mg per tablet Take 1 tablet by mouth 2 (two) times daily.      metFORMIN (GLUCOPHAGE) 500 MG tablet Take 1 tablet (500 mg total) by mouth 2 (two) times daily with meals. 180 tablet 1    metoprolol succinate (TOPROL-XL) 25 MG 24 hr tablet Take 1 tablet (25 mg  "total) by mouth once daily. 90 tablet 1    pantoprazole (PROTONIX) 40 MG tablet Take 1 tablet (40 mg total) by mouth once daily. 90 tablet 1    paroxetine (PAXIL) 20 MG tablet Take 1 tablet (20 mg total) by mouth every evening. 90 tablet 1    simvastatin (ZOCOR) 40 MG tablet Take 1 tablet (40 mg total) by mouth every evening. 90 tablet 3    valsartan (DIOVAN) 160 MG tablet Take 1 tablet (160 mg total) by mouth once daily. 90 tablet 3       Allergies  Review of patient's allergies indicates:   Allergen Reactions    Sulfa (sulfonamide antibiotics) Hives    Bactrim [sulfamethoxazole-trimethoprim] Hives       Physical Examination     Vitals:    06/10/25 1309   BP: 138/78   Pulse: 72   Resp: 18   Temp: 97.9 °F (36.6 °C)   TempSrc: Oral   SpO2: 96%   Weight: 117.8 kg (259 lb 12.8 oz)   Height: 5' 3" (1.6 m)      Physical Exam  Constitutional:       Appearance: She is obese.   HENT:      Head: Normocephalic and atraumatic.      Right Ear: Tympanic membrane, ear canal and external ear normal.      Left Ear: Tympanic membrane, ear canal and external ear normal.      Nose: Rhinorrhea present.      Mouth/Throat:      Pharynx: Oropharynx is clear. No oropharyngeal exudate or posterior oropharyngeal erythema.   Eyes:      General:         Right eye: No discharge.         Left eye: No discharge.      Pupils: Pupils are equal, round, and reactive to light.   Cardiovascular:      Rate and Rhythm: Normal rate and regular rhythm.      Pulses: Normal pulses.      Heart sounds: Normal heart sounds.   Pulmonary:      Effort: Pulmonary effort is normal.      Breath sounds: Normal breath sounds.   Abdominal:      General: There is no distension.      Tenderness: There is no abdominal tenderness.   Musculoskeletal:         General: Normal range of motion.      Cervical back: Normal range of motion and neck supple.   Skin:     General: Skin is warm and dry.   Neurological:      Mental Status: She is alert and oriented to person, place, and " time.   Psychiatric:         Mood and Affect: Mood normal.        Assessment and Plan (including Health Maintenance)     :    Plan: Allergic Rhinitis  Take zyrtec in the morning and singulair at night.  Drink plenty of fluids  Can use flonase.  Inhalers prn  Avoid allergens  Rx zofran for nausea for reported postnasal drip at night        Health Maintenance Due   Topic Date Due    Hemoglobin A1c  03/30/2025    Foot Exam  08/08/2025       Problem List Items Addressed This Visit    None  .  There are no diagnoses linked to this encounter.   Health Maintenance Topics with due status: Not Due       Topic Last Completion Date    TETANUS VACCINE 05/30/2017    Colorectal Cancer Screening 09/14/2023    Diabetic Eye Exam 10/22/2024    Diabetes Urine Screening 11/19/2024    Lipid Panel 11/19/2024    Mammogram 03/27/2025    Low Dose Statin 06/04/2025    RSV Vaccine (Age 60+ and Pregnant patients) Not Due       Procedures     Future Appointments   Date Time Provider Department Center   6/10/2025  2:00 PM Chelsey Lima FNP-C [facility]   6/23/2025  2:20 PM Mehdi Nickerson MD [facility]   11/20/2025  9:20 AM Kelli Paez FNP [facility] [place]        No follow-ups on file.     Signature:  ARMOND Radford    Date of encounter: 6/10/25

## 2025-06-17 ENCOUNTER — HOSPITAL ENCOUNTER (EMERGENCY)
Facility: HOSPITAL | Age: 50
Discharge: HOME | End: 2025-06-17
Payer: COMMERCIAL

## 2025-06-17 VITALS
DIASTOLIC BLOOD PRESSURE: 97 MMHG | HEART RATE: 86 BPM | BODY MASS INDEX: 45.89 KG/M2 | WEIGHT: 259 LBS | OXYGEN SATURATION: 100 % | HEIGHT: 63 IN | SYSTOLIC BLOOD PRESSURE: 159 MMHG | RESPIRATION RATE: 20 BRPM | TEMPERATURE: 98 F

## 2025-06-17 DIAGNOSIS — K21.9 GASTROESOPHAGEAL REFLUX DISEASE, UNSPECIFIED WHETHER ESOPHAGITIS PRESENT: Primary | ICD-10-CM

## 2025-06-17 DIAGNOSIS — R11.2 NAUSEA AND VOMITING: ICD-10-CM

## 2025-06-17 LAB
ALBUMIN SERPL BCP-MCNC: 3.9 G/DL (ref 3.5–5)
ALBUMIN/GLOB SERPL: 1.1 {RATIO}
ALP SERPL-CCNC: 128 U/L (ref 40–150)
ALT SERPL W P-5'-P-CCNC: 37 U/L
ANION GAP SERPL CALCULATED.3IONS-SCNC: 14 MMOL/L (ref 7–16)
AST SERPL W P-5'-P-CCNC: 55 U/L (ref 11–45)
BASOPHILS # BLD AUTO: 0.04 K/UL (ref 0–0.2)
BASOPHILS NFR BLD AUTO: 0.5 % (ref 0–1)
BILIRUB SERPL-MCNC: 0.8 MG/DL
BILIRUB UR QL STRIP: NEGATIVE
BUN SERPL-MCNC: 8 MG/DL (ref 7–19)
BUN/CREAT SERPL: 11 (ref 6–20)
CALCIUM SERPL-MCNC: 10 MG/DL (ref 8.4–10.2)
CHLORIDE SERPL-SCNC: 104 MMOL/L (ref 98–107)
CLARITY UR: CLEAR
CO2 SERPL-SCNC: 31 MMOL/L (ref 22–29)
COLOR UR: YELLOW
CREAT SERPL-MCNC: 0.75 MG/DL (ref 0.55–1.02)
DIFFERENTIAL METHOD BLD: ABNORMAL
EGFR (NO RACE VARIABLE) (RUSH/TITUS): 98 ML/MIN/1.73M2
EOSINOPHIL # BLD AUTO: 0.22 K/UL (ref 0–0.5)
EOSINOPHIL NFR BLD AUTO: 3 % (ref 1–4)
ERYTHROCYTE [DISTWIDTH] IN BLOOD BY AUTOMATED COUNT: 13.1 % (ref 11.5–14.5)
GLOBULIN SER-MCNC: 3.4 G/DL (ref 2–4)
GLUCOSE SERPL-MCNC: 131 MG/DL (ref 74–100)
GLUCOSE UR STRIP-MCNC: NEGATIVE MG/DL
HCT VFR BLD AUTO: 46.6 % (ref 38–47)
HGB BLD-MCNC: 15.5 G/DL (ref 12–16)
KETONES UR STRIP-SCNC: NEGATIVE MG/DL
LEUKOCYTE ESTERASE UR QL STRIP: NEGATIVE
LYMPHOCYTES # BLD AUTO: 2.45 K/UL (ref 1–4.8)
LYMPHOCYTES NFR BLD AUTO: 33 % (ref 27–41)
MCH RBC QN AUTO: 30.9 PG (ref 27–31)
MCHC RBC AUTO-ENTMCNC: 33.3 G/DL (ref 32–36)
MCV RBC AUTO: 92.8 FL (ref 80–96)
MONOCYTES # BLD AUTO: 0.84 K/UL (ref 0–0.8)
MONOCYTES NFR BLD AUTO: 11.3 % (ref 2–6)
MPC BLD CALC-MCNC: 11.4 FL (ref 9.4–12.4)
NEUTROPHILS # BLD AUTO: 3.87 K/UL (ref 1.8–7.7)
NEUTROPHILS NFR BLD AUTO: 52.2 % (ref 53–65)
NITRITE UR QL STRIP: NEGATIVE
PH UR STRIP: 6.5 PH UNITS
PLATELET # BLD AUTO: 142 K/UL (ref 150–400)
POTASSIUM SERPL-SCNC: 3.5 MMOL/L (ref 3.5–5.1)
PROT SERPL-MCNC: 7.3 G/DL (ref 6.4–8.3)
PROT UR QL STRIP: NEGATIVE
RBC # BLD AUTO: 5.02 M/UL (ref 4.2–5.4)
RBC # UR STRIP: NEGATIVE /UL
SODIUM SERPL-SCNC: 145 MMOL/L (ref 136–145)
SP GR UR STRIP: 1.01
UREA BREATH TEST QL: NEGATIVE
UROBILINOGEN UR STRIP-ACNC: 1 MG/DL
WBC # BLD AUTO: 7.42 K/UL (ref 4.5–11)

## 2025-06-17 PROCEDURE — 81003 URINALYSIS AUTO W/O SCOPE: CPT | Performed by: NURSE PRACTITIONER

## 2025-06-17 PROCEDURE — 96375 TX/PRO/DX INJ NEW DRUG ADDON: CPT

## 2025-06-17 PROCEDURE — 80053 COMPREHEN METABOLIC PANEL: CPT | Performed by: NURSE PRACTITIONER

## 2025-06-17 PROCEDURE — 25500020 PHARM REV CODE 255: Performed by: NURSE PRACTITIONER

## 2025-06-17 PROCEDURE — 96374 THER/PROPH/DIAG INJ IV PUSH: CPT | Mod: 59

## 2025-06-17 PROCEDURE — 85025 COMPLETE CBC W/AUTO DIFF WBC: CPT | Performed by: NURSE PRACTITIONER

## 2025-06-17 PROCEDURE — 99285 EMERGENCY DEPT VISIT HI MDM: CPT | Mod: 25

## 2025-06-17 PROCEDURE — 83014 H PYLORI DRUG ADMIN: CPT | Performed by: NURSE PRACTITIONER

## 2025-06-17 PROCEDURE — 36415 COLL VENOUS BLD VENIPUNCTURE: CPT | Performed by: NURSE PRACTITIONER

## 2025-06-17 PROCEDURE — 63600175 PHARM REV CODE 636 W HCPCS: Performed by: NURSE PRACTITIONER

## 2025-06-17 PROCEDURE — 99284 EMERGENCY DEPT VISIT MOD MDM: CPT | Mod: ,,, | Performed by: NURSE PRACTITIONER

## 2025-06-17 RX ORDER — ONDANSETRON 4 MG/1
8 TABLET, ORALLY DISINTEGRATING ORAL EVERY 6 HOURS PRN
Qty: 12 TABLET | Refills: 0 | Status: SHIPPED | OUTPATIENT
Start: 2025-06-17

## 2025-06-17 RX ORDER — ONDANSETRON HYDROCHLORIDE 2 MG/ML
4 INJECTION, SOLUTION INTRAVENOUS
Status: COMPLETED | OUTPATIENT
Start: 2025-06-17 | End: 2025-06-17

## 2025-06-17 RX ORDER — IOPAMIDOL 755 MG/ML
100 INJECTION, SOLUTION INTRAVASCULAR
Status: COMPLETED | OUTPATIENT
Start: 2025-06-17 | End: 2025-06-17

## 2025-06-17 RX ORDER — PANTOPRAZOLE SODIUM 40 MG/10ML
40 INJECTION, POWDER, LYOPHILIZED, FOR SOLUTION INTRAVENOUS
Status: COMPLETED | OUTPATIENT
Start: 2025-06-17 | End: 2025-06-17

## 2025-06-17 RX ADMIN — ONDANSETRON 4 MG: 2 INJECTION INTRAMUSCULAR; INTRAVENOUS at 09:06

## 2025-06-17 RX ADMIN — IOPAMIDOL 100 ML: 755 INJECTION, SOLUTION INTRAVENOUS at 10:06

## 2025-06-17 RX ADMIN — PANTOPRAZOLE SODIUM 40 MG: 40 INJECTION, POWDER, LYOPHILIZED, FOR SOLUTION INTRAVENOUS at 09:06

## 2025-06-17 NOTE — ED TRIAGE NOTES
Pt states she has been vomiting/ dry heaviing x 10 days. Pt was seen in clinic on 6/10 for symptoms and started zofran and Singulair but has not seen any improvement.

## 2025-06-17 NOTE — PROGRESS NOTES
SUNI Cruz   [facility]  [address]   [place] [address]     PATIENT NAME: Glendy Engle  : 1975  DATE: 10/9/24  MRN: [id number]      Billing Provider: SUNI Cruz  Level of Service:   Patient PCP Information       Provider PCP Type    SUNI Cruz General            Reason for Visit / Chief Complaint: Edema (To left lower leg, worse since last night. )         History of Present Illness / Problem Focused Workflow     Glendy Engle is a 49 y.o. female presents to the clinic  with onset last pm with swelling of left leg last night after working > 12 hours and sits/stands and did not have her compression sock on.    She has noted that her glucose is haywire.  AM glucoses around 200 and at night is around 140 at night.  She is eating  late  at night usually around 10pm or after due to work.  She gets up at 6-7am and at work around 7:30 and comes home for about 2 hours in the middle of day when possible.     She had sleep appt earlier in the week and not changes with settings but  got new machine and mask and is much better.    She has some occipital headaches and has history of diabetes and has not seen  optometrist in a while--she should be wearing glasses.  --she will need to  set up appt at [facility].    She has  HTN and is on lisinopril 20mg daily and needs new rx.          Review of Systems     Review of Systems   Constitutional:  Negative for fatigue.   HENT:  Negative for nasal congestion and sore throat.    Respiratory:  Negative for cough, chest tightness and shortness of breath.    Cardiovascular:  Positive for leg swelling (left leg swelling). Negative for chest pain and palpitations.   Gastrointestinal:  Negative for nausea, vomiting and reflux.   Neurological:  Positive for headaches (occipital area). Negative for weakness and memory loss.   Psychiatric/Behavioral:  Negative for confusion and sleep disturbance.         Medical / Social / Family History      Past Medical History:   Diagnosis Date    Abnormal pulmonary function test 12/2019    Abnormal radiograph     Abnormal thyroid blood test 12/01/2016    Asthma     Exposure to severe acute respiratory syndrome coronavirus 2 (SARS-CoV-2) 04/02/2021    GERD (gastroesophageal reflux disease)     Hypertension     SHERLYN on CPAP     Thyroid nodule        Past Surgical History:   Procedure Laterality Date    CHOLECYSTECTOMY      COLONOSCOPY      ESOPHAGOGASTRODUODENOSCOPY      HYSTERECTOMY      LEG SURGERY      vein surgery on right leg    REDUCTION OF BOTH BREASTS      VASCULAR SURGERY      Rt GSV Laser Ablation Phlebectomy: Dr. Sergey Esteves  2017       Social History  Ms.  reports that she has never smoked. She has never been exposed to tobacco smoke. She has never used smokeless tobacco. She reports that she does not currently use alcohol. She reports that she does not use drugs.    Family History  Ms.'s family history includes Cancer in her father and maternal aunt; Diabetes in her maternal grandmother and paternal grandmother; Heart disease in her mother and paternal grandfather; Heart failure in her mother; Hypertension in her mother.    Medications and Allergies     Medications  Outpatient Medications Marked as Taking for the 10/9/24 encounter (Office Visit) with Kelli Paez FNP   Medication Sig Dispense Refill    albuterol (PROVENTIL/VENTOLIN HFA) 90 mcg/actuation inhaler Inhale 2 puffs into the lungs every 6 (six) hours as needed for Wheezing. Rescue 18 g 5    budesonide-formoterol 160-4.5 mcg (SYMBICORT) 160-4.5 mcg/actuation HFAA Inhale 2 puffs into the lungs every 12 (twelve) hours. Controller 10.2 g 5    cetirizine (ZYRTEC) 10 MG tablet Take 1 tablet (10 mg total) by mouth once daily. 90 tablet 1    cyclobenzaprine (FLEXERIL) 10 MG tablet Take 10 mg by mouth 3 (three) times daily as needed for Muscle spasms.      famotidine (PEPCID) 20 MG tablet Take 1 tablet (20 mg total) by mouth every evening. 30 tablet 5     "HYDROcodone-acetaminophen (NORCO)  mg per tablet Take 1 tablet by mouth 2 (two) times daily.      lisinopriL (PRINIVIL,ZESTRIL) 20 MG tablet Take 1 tablet (20 mg total) by mouth once daily. 90 tablet 3    metFORMIN (GLUCOPHAGE) 500 MG tablet Take 1 tablet (500 mg total) by mouth 2 (two) times daily with meals. 180 tablet 1    pantoprazole (PROTONIX) 40 MG tablet Take 1 tablet (40 mg total) by mouth once daily. 30 tablet 3    paroxetine (PAXIL) 20 MG tablet Take 1 tablet (20 mg total) by mouth every evening. 90 tablet 0    simvastatin (ZOCOR) 40 MG tablet Take 1 tablet (40 mg total) by mouth every evening. 90 tablet 3       Allergies  Review of patient's allergies indicates:   Allergen Reactions    Sulfa (sulfonamide antibiotics) Hives    Bactrim [sulfamethoxazole-trimethoprim] Hives       Physical Examination     Vitals:    10/09/24 1316   BP: (!) 144/92   Pulse: 67   Resp: 20   Temp: 98.3 °F (36.8 °C)   TempSrc: Oral   SpO2: 99%   Weight: 115.7 kg (255 lb)   Height: 5' 3" (1.6 m)      Physical Exam  Constitutional:       Appearance: She is obese.   Cardiovascular:      Rate and Rhythm: Normal rate and regular rhythm.      Pulses: Normal pulses.      Heart sounds: Normal heart sounds.   Pulmonary:      Effort: Pulmonary effort is normal.      Breath sounds: Normal breath sounds.   Musculoskeletal:      Right lower leg: No edema.      Left lower leg: Edema (1+ edema of left lower leg) present.   Skin:     General: Skin is warm and dry.   Neurological:      Mental Status: She is alert and oriented to person, place, and time.          Assessment and Plan (including Health Maintenance)     :    Plan:  pt advised to wear compression socks daily and elevate feet throughout the day as she can.  Recommend eye exam asap with [facility].  Discussed need  for weight loss, regulation of blood pressure.         Health Maintenance Due   Topic Date Due    Eye Exam  Never done    HIV Screening  Never done    Influenza " Vaccine (1) 09/01/2024    COVID-19 Vaccine (5 - 2024-25 season) 09/01/2024       Problem List Items Addressed This Visit    None  .  There are no diagnoses linked to this encounter.   Health Maintenance Topics with due status: Not Due       Topic Last Completion Date    TETANUS VACCINE 05/30/2017    Colorectal Cancer Screening 09/14/2023    Mammogram 03/26/2024    Diabetes Urine Screening 08/08/2024    Foot Exam 08/08/2024    Lipid Panel 08/08/2024    Hemoglobin A1c 08/08/2024    Low Dose Statin 09/30/2024    RSV Vaccine (Age 60+ and Pregnant patients) Not Due       Procedures     Future Appointments   Date Time Provider Department Center   11/19/2024  9:30 AM Kelli Paez FNP [facility]   12/17/2024 10:00 AM [facility]    12/17/2024 10:15 AM [facility] [place] [place] [facility]        No follow-ups on file.     Signature:  SUNI Cruz    Date of encounter: 10/9/24     None known

## 2025-06-17 NOTE — ED PROVIDER NOTES
Encounter Date: 6/17/2025       History     Chief Complaint   Patient presents with    Vomiting     History of Present Illness    Patient Identification  Glendy Engle is a 50 y.o. female.    Patient information was obtained from patient.  History/Exam limitations: none.  Patient presented to the Emergency Department by private vehicle.    Chief Complaint   Vomiting        Emesis   This is a recurrent problem. The current episode started several days ago. The problem occurs 2 - 4 times per day. The problem has been waxing and waning. Emesis appearance: brownish. Associated symptoms include abdominal pain. Pertinent negatives include no arthralgias, no chills, no cough, no diarrhea, no fever, no headaches, no myalgias, no sweats and no URI.     Review of patient's allergies indicates:   Allergen Reactions    Sulfa (sulfonamide antibiotics) Hives    Bactrim [sulfamethoxazole-trimethoprim] Hives     Past Medical History:   Diagnosis Date    Abnormal LFTs 11/05/2024    Abnormal pulmonary function test 12/2019    Abnormal radiograph     Abnormal thyroid blood test 12/01/2016    Anxiety 10/07/2024    Asthma     BMI 45.0-49.9, adult     Dysphagia 10/10/2019    Exposure to severe acute respiratory syndrome coronavirus 2 (SARS-CoV-2) 04/02/2021    GERD (gastroesophageal reflux disease)     Hypertension     Mixed stress and urge urinary incontinence 07/27/2022    SHERLYN on CPAP     Thyroid nodule      Past Surgical History:   Procedure Laterality Date    CHOLECYSTECTOMY      COLONOSCOPY      ESOPHAGOGASTRODUODENOSCOPY      HYSTERECTOMY      LEG SURGERY      vein surgery on right leg    REDUCTION OF BOTH BREASTS      VASCULAR SURGERY      Rt GSV Laser Ablation Phlebectomy: Dr. Sergey Esteves  2017     Family History   Problem Relation Name Age of Onset    Heart failure Mother      Heart disease Mother      Hypertension Mother      Cancer Father      Cancer Maternal Aunt      Diabetes Maternal Grandmother      Diabetes Paternal  Grandmother      Heart disease Paternal Grandfather       Social History[1]  Review of Systems   Constitutional:  Negative for chills and fever.   HENT:  Negative for sore throat.    Respiratory:  Negative for cough and shortness of breath.    Cardiovascular:  Negative for chest pain.   Gastrointestinal:  Positive for abdominal pain, nausea and vomiting. Negative for constipation and diarrhea.   Genitourinary:  Negative for dysuria.   Musculoskeletal:  Negative for arthralgias, back pain and myalgias.   Skin:  Negative for rash.   Neurological:  Negative for weakness and headaches.   Hematological:  Does not bruise/bleed easily.       Physical Exam     Initial Vitals [06/17/25 0816]   BP Pulse Resp Temp SpO2   (!) 159/97 86 20 98.1 °F (36.7 °C) 100 %      MAP       --         Physical Exam    Constitutional: Vital signs are normal. She appears well-developed and well-nourished. She is cooperative.   HENT:   Head: Normocephalic and atraumatic.   Right Ear: Hearing normal.   Left Ear: Hearing normal.   Nose: Nose normal. Mouth/Throat: Mucous membranes are normal.   Eyes: Conjunctivae and lids are normal. Right eye exhibits no nystagmus. Left eye exhibits no nystagmus.   Neck: Trachea normal.   Normal range of motion.  Cardiovascular:  Normal rate and regular rhythm.           No murmur heard.  Abdominal: Bowel sounds are decreased. There is abdominal tenderness in the epigastric area and periumbilical area.   Musculoskeletal:      Cervical back: Normal range of motion.     Neurological: She is alert and oriented to person, place, and time. Gait normal.   Skin: Skin is warm.   Psychiatric: She has a normal mood and affect. Her speech is normal and behavior is normal. Judgment and thought content normal. Cognition and memory are normal.         Medical Screening Exam   See Full Note    ED Course   Procedures  Labs Reviewed   COMPREHENSIVE METABOLIC PANEL - Abnormal       Result Value    Sodium 145      Potassium 3.5       Chloride 104      CO2 31 (*)     Anion Gap 14      Glucose 131 (*)     BUN 8      Creatinine 0.75      BUN/Creatinine Ratio 11      Calcium 10.0      Total Protein 7.3      Albumin 3.9      Globulin 3.4      A/G Ratio 1.1      Bilirubin, Total 0.8      Alk Phos 128      ALT 37      AST 55 (*)     eGFR 98     CBC WITH DIFFERENTIAL - Abnormal    WBC 7.42      RBC 5.02      Hemoglobin 15.5      Hematocrit 46.6      MCV 92.8      MCH 30.9      MCHC 33.3      RDW 13.1      Platelet Count 142 (*)     MPV 11.4      Neutrophils % 52.2 (*)     Lymphocytes % 33.0      Neutrophils, Abs 3.87      Lymphocytes, Absolute 2.45      Diff Type Auto      Monocytes % 11.3 (*)     Eosinophils % 3.0      Basophils % 0.5      Monocytes, Absolute 0.84 (*)     Eosinophils, Absolute 0.22      Basophils, Absolute 0.04     H. PYLORI BREATH TEST - Normal    H. Pylori Breath Test Negative     CBC W/ AUTO DIFFERENTIAL    Narrative:     The following orders were created for panel order CBC auto differential.  Procedure                               Abnormality         Status                     ---------                               -----------         ------                     CBC with Differential[[id number]]       Abnormal            Final result                 Please view results for these tests on the individual orders.   URINALYSIS, REFLEX TO URINE CULTURE    Color, UA Yellow      Clarity, UA Clear      pH, UA 6.5      Leukocytes, UA Negative      Nitrites, UA Negative      Protein, UA Negative      Glucose, UA Negative      Ketones, UA Negative      Urobilinogen, UA 1.0      Bilirubin, UA Negative      Blood, UA Negative      Specific Gravity, UA 1.015            Imaging Results              CT Abdomen Pelvis With IV Contrast NO Oral Contrast (Final result)  Result time 06/17/25 11:30:33      Final result by Jammie Barrientos MD (06/17/25 11:30:33)                   Impression:      No acute finding to correlate with the clinical  history.    Cholecystectomy.  Hysterectomy.    Density of the liver is suggestive of steatosis.    Mild splenomegaly.      Electronically signed by: Jammie Barrientos MD  Date:    06/17/2025  Time:    11:30               Narrative:    EXAMINATION:  CT ABDOMEN PELVIS WITH IV CONTRAST    CLINICAL HISTORY:  Nausea/vomiting;    TECHNIQUE:  Low dose axial images, sagittal and coronal reformations were obtained from the lung bases to the pubic symphysis following the IV administration of 100 mL of Isovue 370 and no orally administered contrast    COMPARISON:  None.    FINDINGS:  The lung bases demonstrate no significant abnormality.    The liver density is suggestive of steatosis.    Spleen is mildly enlarged.    Stomach is unremarkable.  Pancreas is within normal limits.    Gallbladder has been removed.  There is no biliary ductal dilatation.    The adrenal glands demonstrate no mass.    The kidneys demonstrate no hydronephrosis or mass.    The abdominal aorta tapers normally without adjacent lymphadenopathy.  There is no pelvic lymphadenopathy.    Bladder is unremarkable.  Uterus has been removed.  Adnexal regions are unremarkable.    The bowel demonstrates no distension or adjacent inflammatory change.  The appendix is within normal limits.    The osseous structures show degenerative change.                                       X-Ray Abdomen Flat And Erect (Final result)  Result time 06/17/25 09:51:07      Final result by Jammie Barrientos MD (06/17/25 09:51:07)                   Impression:      As above      Electronically signed by: Jammie Barrientos MD  Date:    06/17/2025  Time:    09:51               Narrative:    EXAMINATION:  XR ABDOMEN FLAT AND ERECT    CLINICAL HISTORY:  Nausea with vomiting, unspecified    TECHNIQUE:  Flat and erect AP views of the abdomen were performed.    COMPARISON:  None    FINDINGS:  There is no free air.  There are surgical clips in the upper abdomen.  The bowel gas pattern is  unremarkable with no evidence of bowel distension.  Mild to moderate stool within the colon.    Osseous structures show degenerative change.                                       Medications   ondansetron injection 4 mg (4 mg Intravenous Given 6/17/25 0941)   pantoprazole injection 40 mg (40 mg Intravenous Given 6/17/25 0941)   iopamidoL (ISOVUE-370) injection 100 mL (100 mLs Intravenous Given 6/17/25 1047)     Medical Decision Making  Patient presenting with upper abdominal pain.  Differential included GERD, Gastritis, PUD, cholelithiasis, nephrolithiasis, urinary tract infection, cholecystitis, hepatitis, pancreatitis.  Symptoms at this time most consistent with gastritis.      Diagnostic testing performed: WBC normal, metabolic panel normal, urinalysis does not show infection, CT of the abdomen shows no acute findings; H.pylori breath test negative.    Given Protonix 40 mg IV and Zofran 4 mg IV, with some improvement in symptoms.     Problems Addressed:  Gastroesophageal reflux disease, unspecified whether esophagitis present: chronic illness or injury with exacerbation, progression, or side effects of treatment     Details: Increase Pepcid to 40 mg every evening  Prescribed Zofran ODT  Patient scheduled appointment with [facility] in [place], [place] for July 9th  Discussed with patient to cut down on irritating substances such as fatty foods, tobacco, alcohol, NSAIDs that could be contributing to symptoms.  Present to emergency department urgently if new or worsening symptoms develop.    Amount and/or Complexity of Data Reviewed  Labs: ordered. Decision-making details documented in ED Course.  Radiology: ordered.     Details: CT Abdomen Pelvis With IV Contrast NO Oral Contrast  Narrative: EXAMINATION:  CT ABDOMEN PELVIS WITH IV CONTRAST    CLINICAL HISTORY:  Nausea/vomiting;    TECHNIQUE:  Low dose axial images, sagittal and coronal reformations were obtained from the lung bases to the pubic symphysis following  the IV administration of 100 mL of Isovue 370 and no orally administered contrast    COMPARISON:  None.    FINDINGS:  The lung bases demonstrate no significant abnormality.    The liver density is suggestive of steatosis.    Spleen is mildly enlarged.    Stomach is unremarkable.  Pancreas is within normal limits.    Gallbladder has been removed.  There is no biliary ductal dilatation.    The adrenal glands demonstrate no mass.    The kidneys demonstrate no hydronephrosis or mass.    The abdominal aorta tapers normally without adjacent lymphadenopathy.  There is no pelvic lymphadenopathy.    Bladder is unremarkable.  Uterus has been removed.  Adnexal regions are unremarkable.    The bowel demonstrates no distension or adjacent inflammatory change.  The appendix is within normal limits.    The osseous structures show degenerative change.  Impression: No acute finding to correlate with the clinical history.    Cholecystectomy.  Hysterectomy.    Density of the liver is suggestive of steatosis.    Mild splenomegaly.    Electronically signed by: Jammie Barrientos MD  Date:    06/17/2025  Time:    11:30  X-Ray Abdomen Flat And Erect  Narrative: EXAMINATION:  XR ABDOMEN FLAT AND ERECT    CLINICAL HISTORY:  Nausea with vomiting, unspecified    TECHNIQUE:  Flat and erect AP views of the abdomen were performed.    COMPARISON:  None    FINDINGS:  There is no free air.  There are surgical clips in the upper abdomen.  The bowel gas pattern is unremarkable with no evidence of bowel distension.  Mild to moderate stool within the colon.    Osseous structures show degenerative change.  Impression: As above    Electronically signed by: Jammie Barrientos MD  Date:    06/17/2025  Time:    09:51     Risk  Prescription drug management.  Risk Details: Moderate               ED Course as of 06/17/25 2142   Tue Jun 17, 2025   0911 Color, UA: Yellow [MM]   0912 Appearance, UA: Clear [MM]   0912 pH, UA: 6.5 [MM]   0912 Leukocyte Esterase, UA:  Negative [MM]   0912 NITRITE UA: Negative [MM]   0912 Protein, UA: Negative [MM]   0912 Glucose, UA: Negative [MM]   0912 Ketones, UA: Negative [MM]   0912 UROBILINOGEN UA: 1.0 [MM]   0912 Bilirubin (UA): Negative [MM]   0912 Blood, UA: Negative [MM]   0912 Spec Grav UA: 1.015 [MM]   0956 WBC: 7.42 [MM]   0956 RBC: 5.02 [MM]   0956 Hemoglobin: 15.5 [MM]   0956 Hematocrit: 46.6 [MM]   0956 Platelet Count(!): 142 [MM]   0957 Sodium: 145 [MM]   0957 Potassium: 3.5 [MM]   0957 Chloride: 104 [MM]   0957 CO2(!): 31 [MM]   0957 Anion Gap: 14 [MM]   0957 Glucose(!): 131 [MM]   0957 BUN: 8 [MM]   0957 Creatinine: 0.75 [MM]   0957 ALP: 128 [MM]   0957 ALT: 37 [MM]   0957 AST(!): 55 [MM]   0957 eGFR: 98 [MM]      ED Course User Index  [MM] Elicia Martinez FNP                           Clinical Impression:   Final diagnoses:  [R11.2] Nausea and vomiting  [K21.9] Gastroesophageal reflux disease, unspecified whether esophagitis present (Primary)        ED Disposition Condition    Discharge Stable          ED Prescriptions       Medication Sig Dispense Start Date End Date Auth. Provider    ondansetron (ZOFRAN-ODT) 4 MG TbDL Take 2 tablets (8 mg total) by mouth every 6 (six) hours as needed (nausea). 12 tablet 6/17/2025 -- Elicia Martinez FNP          Follow-up Information       Follow up With Specialties Details Why Contact Info    PCP  In 1 week                   [1]   Social History  Tobacco Use    Smoking status: Never     Passive exposure: Never    Smokeless tobacco: Never   Substance Use Topics    Alcohol use: Not Currently    Drug use: Never        Elicia Martinez FNP  06/17/25 1054

## 2025-06-18 ENCOUNTER — RESULTS FOLLOW-UP (OUTPATIENT)
Dept: EMERGENCY MEDICINE | Facility: HOSPITAL | Age: 50
End: 2025-06-18
Payer: COMMERCIAL

## 2025-06-18 ENCOUNTER — TELEPHONE (OUTPATIENT)
Dept: EMERGENCY MEDICINE | Facility: HOSPITAL | Age: 50
End: 2025-06-18
Payer: COMMERCIAL

## 2025-06-18 NOTE — TELEPHONE ENCOUNTER
Pt states is ok, but weak. Encouraged to drink fluids to stay hydrated and to fu with pcp today or tomorrow.

## 2025-06-23 ENCOUNTER — OFFICE VISIT (OUTPATIENT)
Dept: ORTHOPEDICS | Facility: CLINIC | Age: 50
End: 2025-06-23
Payer: COMMERCIAL

## 2025-06-23 VITALS — WEIGHT: 259.06 LBS | BODY MASS INDEX: 45.9 KG/M2 | HEIGHT: 63 IN

## 2025-06-23 DIAGNOSIS — M17.11 PRIMARY OSTEOARTHRITIS OF RIGHT KNEE: ICD-10-CM

## 2025-06-23 PROCEDURE — 20610 DRAIN/INJ JOINT/BURSA W/O US: CPT | Mod: PBBFAC,RT

## 2025-06-23 PROCEDURE — 4010F ACE/ARB THERAPY RXD/TAKEN: CPT | Mod: CPTII,,,

## 2025-06-23 PROCEDURE — 3044F HG A1C LEVEL LT 7.0%: CPT | Mod: CPTII,,,

## 2025-06-23 PROCEDURE — 99213 OFFICE O/P EST LOW 20 MIN: CPT | Mod: PBBFAC

## 2025-06-23 PROCEDURE — 99999PBSHW PR PBB SHADOW TECHNICAL ONLY FILED TO HB: Mod: PBBFAC,,,

## 2025-06-23 PROCEDURE — 3008F BODY MASS INDEX DOCD: CPT | Mod: CPTII,,,

## 2025-06-23 PROCEDURE — 99999 PR PBB SHADOW E&M-EST. PATIENT-LVL III: CPT | Mod: PBBFAC,,,

## 2025-06-23 PROCEDURE — 99203 OFFICE O/P NEW LOW 30 MIN: CPT | Mod: S$PBB,25,,

## 2025-06-23 RX ADMIN — BUPIVACAINE HYDROCHLORIDE 1 ML: 2.5 INJECTION, SOLUTION EPIDURAL; INFILTRATION; INTRACAUDAL; PERINEURAL at 02:06

## 2025-06-23 RX ADMIN — TRIAMCINOLONE ACETONIDE 40 MG: 40 INJECTION, SUSPENSION INTRA-ARTICULAR; INTRAMUSCULAR at 02:06

## 2025-06-23 NOTE — PROCEDURES
Large Joint Aspiration/Injection: R knee    Date/Time: 6/23/2025 2:20 PM    Performed by: So Smith PA  Authorized by: So Smith PA    Consent Done?:  Yes (Verbal)  Indications:  Arthritis and pain  Site marked: the procedure site was marked      Details:  Needle Size:  22 G  Approach:  Anterolateral  Location:  Knee  Site:  R knee  Medications:  1 mL BUPivacaine (PF) 0.25% (2.5 mg/ml) 0.25 % (2.5 mg/mL); 40 mg triamcinolone acetonide 40 mg/mL  Patient tolerance:  Patient tolerated the procedure well with no immediate complications

## 2025-06-24 ENCOUNTER — OFFICE VISIT (OUTPATIENT)
Dept: FAMILY MEDICINE | Facility: CLINIC | Age: 50
End: 2025-06-24
Payer: COMMERCIAL

## 2025-06-24 VITALS
HEIGHT: 63 IN | DIASTOLIC BLOOD PRESSURE: 69 MMHG | WEIGHT: 256 LBS | RESPIRATION RATE: 18 BRPM | OXYGEN SATURATION: 96 % | SYSTOLIC BLOOD PRESSURE: 136 MMHG | BODY MASS INDEX: 45.36 KG/M2 | TEMPERATURE: 98 F

## 2025-06-24 DIAGNOSIS — R07.9 CHEST PAIN, UNSPECIFIED TYPE: Primary | ICD-10-CM

## 2025-06-24 DIAGNOSIS — I10 PRIMARY HYPERTENSION: Chronic | ICD-10-CM

## 2025-06-24 DIAGNOSIS — R07.89 ANTERIOR CHEST WALL PAIN: ICD-10-CM

## 2025-06-24 DIAGNOSIS — G47.33 OSA ON CPAP: Chronic | ICD-10-CM

## 2025-06-24 DIAGNOSIS — E04.2 MULTINODULAR GOITER: ICD-10-CM

## 2025-06-24 DIAGNOSIS — R73.03 PREDIABETES: ICD-10-CM

## 2025-06-24 DIAGNOSIS — K76.0 FATTY LIVER DISEASE, NONALCOHOLIC: ICD-10-CM

## 2025-06-24 DIAGNOSIS — K21.9 GASTROESOPHAGEAL REFLUX DISEASE WITHOUT ESOPHAGITIS: ICD-10-CM

## 2025-06-24 DIAGNOSIS — R06.09 DYSPNEA ON EXERTION: ICD-10-CM

## 2025-06-24 PROBLEM — E78.2 MIXED HYPERLIPIDEMIA: Status: ACTIVE | Noted: 2024-10-07

## 2025-06-24 PROBLEM — K31.7 GASTRIC POLYPS: Status: ACTIVE | Noted: 2025-03-07

## 2025-06-24 PROBLEM — F41.9 ANXIETY: Status: ACTIVE | Noted: 2024-10-07

## 2025-06-24 LAB
EKG 12-LEAD: NORMAL
EST. AVERAGE GLUCOSE BLD GHB EST-MCNC: 117 MG/DL
HBA1C MFR BLD HPLC: 5.7 %
PR INTERVAL: NORMAL
PRT AXES: NORMAL
QRS DURATION: NORMAL
QT/QTC: NORMAL
VENTRICULAR RATE: NORMAL

## 2025-06-24 PROCEDURE — 93005 ELECTROCARDIOGRAM TRACING: CPT | Mod: ,,, | Performed by: NURSE PRACTITIONER

## 2025-06-24 PROCEDURE — 3078F DIAST BP <80 MM HG: CPT | Mod: CPTII,,, | Performed by: NURSE PRACTITIONER

## 2025-06-24 PROCEDURE — 1159F MED LIST DOCD IN RCRD: CPT | Mod: CPTII,,, | Performed by: NURSE PRACTITIONER

## 2025-06-24 PROCEDURE — 3008F BODY MASS INDEX DOCD: CPT | Mod: CPTII,,, | Performed by: NURSE PRACTITIONER

## 2025-06-24 PROCEDURE — 1160F RVW MEDS BY RX/DR IN RCRD: CPT | Mod: CPTII,,, | Performed by: NURSE PRACTITIONER

## 2025-06-24 PROCEDURE — 4010F ACE/ARB THERAPY RXD/TAKEN: CPT | Mod: CPTII,,, | Performed by: NURSE PRACTITIONER

## 2025-06-24 PROCEDURE — 99214 OFFICE O/P EST MOD 30 MIN: CPT | Mod: ,,, | Performed by: NURSE PRACTITIONER

## 2025-06-24 PROCEDURE — 83036 HEMOGLOBIN GLYCOSYLATED A1C: CPT | Mod: ,,, | Performed by: CLINICAL MEDICAL LABORATORY

## 2025-06-24 PROCEDURE — 3075F SYST BP GE 130 - 139MM HG: CPT | Mod: CPTII,,, | Performed by: NURSE PRACTITIONER

## 2025-06-24 NOTE — PROGRESS NOTES
"   SUNI Cruz   [facility]  [address]   [place] [address]     PATIENT NAME: Glendy Engle  : 1975  DATE: 25  MRN: [id number]      Billing Provider: SUNI Cruz  Level of Service: [place] OFFICE/OUTPT VISIT, EST, LEVL IV, 30-39 MIN  Patient PCP Information       Provider PCP Type    SUNI Cruz General              Reason for Visit / Chief Complaint: Gastroesophageal Reflux (Has appt with [facility] 2025 in [place]) and ER follow up (Went to [facility] for vomiting 2025)       History of Present Illness / Problem Focused Workflow     History of Present Illness    CHIEF COMPLAINT:  Patient presents today for follow up after emergency room visit for persistent vomiting.    GASTROINTESTINAL:  She reports ongoing gastric reflux primarily occurring at night with epigastric pain radiating to her back. She experienced vomiting for approximately two weeks and had a reflux episode last night. She currently takes Pepcid 40mg twice daily, recently increased from 20mg. She also reports swallowing difficulties, particularly with medications, describing a sensation of food or medication getting "hung up" in her throat. Swallowing remains relatively easy overall with intermittent challenges, without choking or complete obstruction. H. pylori test was negative.    RESPIRATORY:  She reports intermittent shortness of breath with positional changes, specifically when bending over and standing back up, and after routine activities such as showering. She experiences mild chest discomfort with breathing but denies chest pain, palpitations, syncope, orthopnea, or paroxysmal nocturnal dyspnea. She uses her sleep apnea device consistently with next follow-up scheduled for October.    THYROID:  She has a history of goiter identified in , which was noted to be stable during initial evaluation. Last thyroid evaluation was completed in 2023, with no significant changes or " symptoms reported since initial diagnosis.    MUSCULOSKELETAL:  She received a knee injection in 2023 and again recenlty 2025.    FAMILY HISTORY:  Mother  from heart disease around age 60 with history of congestive heart failure. Patient is currently 50 years old.    CURRENT MEDICATIONS:  She takes Metformin with associated nausea and vomiting. She discontinued Paxil and reports feeling stable without it.    LABS:  Blood sugar 131, AST slightly elevated, platelets slightly low at 140 (stable from previous 147).      ROS:  General: -fever, -chills, -fatigue, -weight gain, -weight loss  Eyes: -vision changes, -redness, -discharge  ENT: -ear pain, -nasal congestion, -sore throat  Cardiovascular: +chest pain, -palpitations, -lower extremity edema, +shooting pain sensation  Respiratory: -cough, -shortness of breath, +exertional dyspnea  Gastrointestinal: -abdominal pain, -nausea, +vomiting, -diarrhea, +constipation, -blood in stool, +heartburn, +indigestion, +difficulty swallowing  Genitourinary: -dysuria, -hematuria, -frequency  Musculoskeletal: -joint pain, -muscle pain, +cold extremities  Skin: -rash, -lesion  Neurological: -headache, -dizziness, -numbness, -tingling, +nerve pain  Psychiatric: -anxiety, -depression, -sleep difficulty           Medical / Social / Family History     Past Medical History:   Diagnosis Date    Abnormal LFTs 2024    Abnormal pulmonary function test 2019    Abnormal radiograph     Abnormal thyroid blood test 2016    Anxiety 10/07/2024    Asthma     BMI 45.0-49.9, adult     Dysphagia 10/10/2019    Exposure to severe acute respiratory syndrome coronavirus 2 (SARS-CoV-2) 2021    GERD (gastroesophageal reflux disease)     Hypertension     Mixed stress and urge urinary incontinence 2022    SHERLYN on CPAP     Thyroid nodule        Past Surgical History:   Procedure Laterality Date    CHOLECYSTECTOMY      COLONOSCOPY      ESOPHAGOGASTRODUODENOSCOPY       HYSTERECTOMY      LEG SURGERY      vein surgery on right leg    REDUCTION OF BOTH BREASTS      VASCULAR SURGERY      Rt GSV Laser Ablation Phlebectomy: Dr. Sergey Esteves  2017       Social History  Ms.  reports that she has never smoked. She has never been exposed to tobacco smoke. She has never used smokeless tobacco. She reports that she does not currently use alcohol. She reports that she does not use drugs.    Family History  Ms.'s family history includes Cancer in her father and maternal aunt; Diabetes in her maternal grandmother and paternal grandmother; Heart disease in her mother and paternal grandfather; Heart failure in her mother; Hypertension in her mother.    Medications and Allergies     Medications  Outpatient Medications Marked as Taking for the 6/24/25 encounter (Office Visit) with Kelli Paez FNP   Medication Sig Dispense Refill    albuterol (PROVENTIL/VENTOLIN HFA) 90 mcg/actuation inhaler Inhale 2 puffs into the lungs every 6 (six) hours as needed for Wheezing. Rescue 18 g 5    budesonide-formoterol 160-4.5 mcg (SYMBICORT) 160-4.5 mcg/actuation HFAA Inhale 2 puffs into the lungs every 12 (twelve) hours. Controller 10.2 g 5    cetirizine (ZYRTEC) 10 MG tablet Take 1 tablet (10 mg total) by mouth once daily. 90 tablet 1    cyclobenzaprine (FLEXERIL) 10 MG tablet Take 10 mg by mouth 3 (three) times daily as needed for Muscle spasms.      famotidine (PEPCID) 20 MG tablet Take 1 tablet (20 mg total) by mouth every evening. (Patient taking differently: Take 40 mg by mouth every evening. Reports taking two 20mg tabs at bedtime per ER at [place]) 90 tablet 1    HYDROcodone-acetaminophen (NORCO)  mg per tablet Take 1 tablet by mouth 2 (two) times daily.      metFORMIN (GLUCOPHAGE) 500 MG tablet Take 1 tablet (500 mg total) by mouth 2 (two) times daily with meals. 180 tablet 1    metoprolol succinate (TOPROL-XL) 25 MG 24 hr tablet Take 1 tablet (25 mg total) by mouth once daily. 90 tablet 1    montelukast  "(SINGULAIR) 10 mg tablet Take 1 tablet (10 mg total) by mouth every evening. 30 tablet 0    ondansetron (ZOFRAN-ODT) 4 MG TbDL Take 2 tablets (8 mg total) by mouth every 6 (six) hours as needed (nausea). 12 tablet 0    pantoprazole (PROTONIX) 40 MG tablet Take 1 tablet (40 mg total) by mouth once daily. 90 tablet 1    simvastatin (ZOCOR) 40 MG tablet Take 1 tablet (40 mg total) by mouth every evening. 90 tablet 3    valsartan (DIOVAN) 160 MG tablet Take 1 tablet (160 mg total) by mouth once daily. 90 tablet 3       Allergies  Review of patient's allergies indicates:   Allergen Reactions    Sulfa (sulfonamide antibiotics) Hives    Bactrim [sulfamethoxazole-trimethoprim] Hives       Physical Examination     Vitals:    06/24/25 1302   BP: 136/69   BP Location: Right arm   Patient Position: Sitting   Resp: 18   Temp: 98.4 °F (36.9 °C)   TempSrc: Oral   SpO2: 96%   Weight: 116.1 kg (256 lb)   Height: 5' 3" (1.6 m)        Physical Exam    General: No acute distress. Well-developed. Well-nourished.  Eyes: EOMI. Sclerae anicteric.  HENT: Normocephalic. Atraumatic. Nares patent. Moist oral mucosa.  Ears: Bilateral TMs clear. Bilateral EACs clear.  Cardiovascular: Regular rate. Regular rhythm. No murmurs. No rubs. No gallops. Normal S1, S2.  Respiratory: Normal respiratory effort. Clear to auscultation bilaterally. No rales. No rhonchi. No wheezing.  Abdomen: Soft. Non-tender. Non-distended. Normoactive bowel sounds. Tenderness in epigastric area.  Musculoskeletal: No  obvious deformity.  Extremities: No lower extremity edema.  Neurological: Alert & oriented x3. No slurred speech. Normal gait.  Psychiatric: Normal mood. Normal affect. Good insight. Good judgment.  Skin: Warm. Dry. No rash.  MSK: Foot/Ankle - Right: Right foot: Decreased sensation in two toes.  MSK: Foot/Ankle - Left: Left foot: Normal sensation.       Physical Exam  Cardiovascular:      Pulses:           Dorsalis pedis pulses are 2+ on the right side and 2+ " "on the left side.        Posterior tibial pulses are 1+ on the right side and 1+ on the left side.   Musculoskeletal:      Right foot: Normal range of motion. No deformity, bunion, Charcot foot, foot drop or prominent metatarsal heads.      Left foot: Normal range of motion. No deformity, bunion, Charcot foot, foot drop or prominent metatarsal heads.   Feet:      Right foot:      Protective Sensation: 10 sites tested.  8 sites sensed.      Skin integrity: Skin integrity normal.      Toenail Condition: Right toenails are normal.      Left foot:      Protective Sensation: 10 sites tested.  10 sites sensed.      Skin integrity: Skin integrity normal.      Toenail Condition: Left toenails are normal.          Laboratory     Lab Results   Component Value Date     (H) 06/17/2025     06/17/2025    K 3.5 06/17/2025     06/17/2025    CO2 31 (H) 06/17/2025    BUN 8 06/17/2025    CREATININE 0.75 06/17/2025    CALCIUM 10.0 06/17/2025    PROT 7.3 06/17/2025    ALBUMIN 3.9 06/17/2025    BILITOT 0.8 06/17/2025    ALKPHOS 128 06/17/2025    AST 55 (H) 06/17/2025    ALT 37 06/17/2025    ANIONGAP 14 06/17/2025    EGFRNONAA 75 07/13/2021       Lab Results   Component Value Date    WBC 7.42 06/17/2025    RBC 5.02 06/17/2025    HGB 15.5 06/17/2025    HCT 46.6 06/17/2025    MCV 92.8 06/17/2025    RDW 13.1 06/17/2025     (L) 06/17/2025        Lab Results   Component Value Date    CHOL 146 11/19/2024    TRIG 103 11/19/2024    HDL 50 11/19/2024    LDLCALC 75 11/19/2024       Lab Results   Component Value Date    TSH 1.690 07/12/2023       Lab Results   Component Value Date    HGBA1C 5.5 09/30/2024    ESTIMATEDAVG 108 08/08/2024        Lab Results   Component Value Date    VOWCEBXK52 521 11/15/2022       No results found for: "VHROXNUK48GM"    No results found for: "PSA"    Assessment and Plan (including Health Maintenance)     :Assessment & Plan    E11.42 Type 2 diabetes mellitus with diabetic polyneuropathy  K21.9 " Gastro-esophageal reflux disease without esophagitis  R13.10 Dysphagia, unspecified  G47.33 Obstructive sleep apnea (adult) (pediatric)  E04.9 Nontoxic goiter, unspecified  R94.5 Abnormal results of liver function studies  D69.6 Thrombocytopenia, unspecified  R06.02 Shortness of breath  Z82.49 Family history of ischemic heart disease and other diseases of the circulatory system    TYPE 2 DIABETES MELLITUS WITH DIABETIC POLYNEUROPATHY:  - Evaluated neuropathy in feet, noting more pronounced symptoms in right foot compared to normal left foot.  - Monitored glucose levels (127 this morning, 120 last night).  - Assessed patient as borderline diabetic with neuropathy from both back surgeries and diabetes.  - Informed patient about potential effect of steroid injections on glucose levels.  - Considered metformin as possible contributor to nausea/vomiting symptoms.  - Recommend follow-up for A1C check to reassess metformin therapy and consider changes if indicated.    GASTRO-ESOPHAGEAL REFLUX DISEASE:  - Monitored reflux symptoms, noting episodes occur mostly at night with vomiting persisting for almost 3 weeks.  - Evaluated epigastric tenderness and pain radiating to the back during exam.  - Assessed recent ER visit for vomiting.  - Increased Pepcid dosage to 40 mg twice daily at night and continued pantoprazole in the morning.  - Reviewed recent lab work, including negative H. pylori test.  - Patient education provided: elevate head of bed by placing a brick under each bedpost at the top end, avoid fried foods which impact both reflux and cholesterol levels, avoid eating late at night, and consider weight loss as potential benefit for reflux management.    DYSPHAGIA:  - Monitored patient's complaint that medication gets hung up when swallowing.  - Assessed need for esophageal evaluation and possible dilation if swallowing difficulty persists.  - Scheduled follow-up to discuss this further.    OBSTRUCTIVE SLEEP APNEA:  -  Monitored patient's consistent use of sleep apnea machine.  - Ordered new supplies for the machine.  - Scheduled follow-up visit in October for sleep apnea reassessment.    NONTOXIC GOITER:  - Monitored goiter which was evaluated in December 2023 and showed barely any growth.  - Recommend reevaluation of thyroid if swallowing difficulty persists.    ABNORMAL LIVER FUNCTION STUDIES:  - Reviewed recent lab work, noting AST liver test was slightly elevated but not significantly.    THROMBOCYTOPENIA:  - Monitored platelet count, which remains slightly low at 140 (normal is 150) but stable.  - Ordered platelet recheck in 2 months to ensure stabilization.    SHORTNESS OF BREATH AND CARDIAC RISK FACTORS:  - Monitored shortness of breath occurring when bending over, standing up, taking a shower, and walking.  - Considered symptoms as potential cardiac risk factor, along with family history (mother's congestive heart failure and uncle's heart disease).  - Ordered EKG and cardiac workup.  - Recommend increasing physical activity, starting with short walks to mailbox.    FOLLOW-UP:  - Scheduled follow-up next month to discuss potential weight loss medications (GLP-1 agonists).             Problem List Items Addressed This Visit       SHERLYN on CPAP (Chronic)    Hypertension (Chronic)    GERD (gastroesophageal reflux disease) (Chronic)    Prediabetes    Fatty liver disease, nonalcoholic    Multinodular goiter    Dyspnea on exertion    Anterior chest wall pain     Other Visit Diagnoses         Chest pain, unspecified type    -  Primary        .      Health Maintenance Due   Topic Date Due    Hemoglobin A1c  03/30/2025     Health Maintenance Topics with due status: Not Due       Topic Last Completion Date    TETANUS VACCINE 05/30/2017    Colorectal Cancer Screening 09/14/2023    Diabetic Eye Exam 10/22/2024    Diabetes Urine Screening 11/19/2024    Lipid Panel 11/19/2024    Mammogram 03/27/2025    Low Dose Statin 06/24/2025    Foot  Exam 06/24/2025    RSV Vaccine (Age 60+ and Pregnant patients) Not Due       Procedures     Future Appointments   Date Time Provider Department Center   7/16/2025 10:00 AM Watson Long DO [facility]   9/23/2025  1:00 PM So Smith PA [facility]   11/20/2025  9:20 AM Kelli Paez FNP [facility]        Follow up in approx 3 weeks.  Advised to discuss with Dr Rico GI, regarding use of GLP1 for obesity, fatty liver disease and  prediabetes.     Signature:  SUNI Cruz    Date of encounter: 6/24/25      This note was generated with the assistance of ambient listening technology. Verbal consent was obtained by the patient and accompanying visitor(s) for the recording of patient appointment to facilitate this note. I attest to having reviewed and edited the generated note for accuracy, though some syntax or spelling errors may persist. Please contact the author of this note for any clarification.

## 2025-06-25 ENCOUNTER — RESULTS FOLLOW-UP (OUTPATIENT)
Dept: FAMILY MEDICINE | Facility: CLINIC | Age: 50
End: 2025-06-25

## 2025-07-01 NOTE — PROGRESS NOTES
CC:   Chief Complaint   Patient presents with    Right Knee - Pain     Pt is being seen for right knee steroid injection today       HPI     Pain     Additional comments: Pt is being seen for right knee steroid injection   today           Last edited by Evangelina Gonzalez CMA on 6/23/2025  2:32 PM.        Glendy Engle is a 50 y.o. female seen today for Pain of the Right Knee (Pt is being seen for right knee steroid injection today )  Patient presents today as a referral for chronic knee pain and osteoarthritis. She reports pain worsening over the past month. She reports knee pain and swelling/ X-rays on 05/21/2025 with mild arthritis and possible small effusion. No other complaints today.       PAST MEDICAL HISTORY:   Past Medical History:   Diagnosis Date    Abnormal LFTs 11/05/2024    Abnormal pulmonary function test 12/2019    Abnormal radiograph     Abnormal thyroid blood test 12/01/2016    Anxiety 10/07/2024    Asthma     BMI 45.0-49.9, adult     Dysphagia 10/10/2019    Exposure to severe acute respiratory syndrome coronavirus 2 (SARS-CoV-2) 04/02/2021    GERD (gastroesophageal reflux disease)     Hypertension     Mixed stress and urge urinary incontinence 07/27/2022    SHERLYN on CPAP     Thyroid nodule           PAST SURGICAL HISTORY:   Past Surgical History:   Procedure Laterality Date    CHOLECYSTECTOMY      COLONOSCOPY      ESOPHAGOGASTRODUODENOSCOPY      HYSTERECTOMY      LEG SURGERY      vein surgery on right leg    REDUCTION OF BOTH BREASTS      VASCULAR SURGERY      Rt GSV Laser Ablation Phlebectomy: Dr. Sergey Esteves  2017          ALLERGIES:   Review of patient's allergies indicates:   Allergen Reactions    Sulfa (sulfonamide antibiotics) Hives    Bactrim [sulfamethoxazole-trimethoprim] Hives        MEDICATIONS :  Current Medications[1]     SOCIAL HISTORY: Social History[2]     FAMILY HISTORY:   Family History   Problem Relation Name Age of Onset    Heart failure Mother      Heart disease  Mother      Hypertension Mother      Cancer Father      Cancer Maternal Aunt      Diabetes Maternal Grandmother      Diabetes Paternal Grandmother      Heart disease Paternal Grandfather            PHYSICAL EXAM:      There were no vitals filed for this visit.  Body mass index is 45.89 kg/m².    GENERAL: Well-developed, well-nourished female . The patient is alert, oriented and cooperative.    HEENT:  Normocephalic, atraumatic.  Extraocular movements are intact bilaterally.     NECK:  Nontender with good range of motion.    LUNGS:  Clear to auscultation bilaterally.    HEART:  Regular rate and rhythm.     ABDOMEN:  Soft, non-tender, non-distended.      EXTREMITIES: R knee tender to palpation, ROM from 0-120 degrees, stable to varus and valgus stress testing, NVI      RADIOGRAPHIC FINDINGS:   None today     Patient Active Problem List    Diagnosis Date Noted    Dyspnea on exertion 06/24/2025    Anterior chest wall pain 06/24/2025    Gastric polyps 03/07/2025    Anxiety 10/07/2024    Chronic pain 10/07/2024    Personal history of other diseases of the digestive system 10/07/2024    Mixed hyperlipidemia 10/07/2024    Asthma 08/08/2024    Mass of lower inner quadrant of right breast 02/27/2024    Chronic heel pain, left 08/16/2023    Diabetes 07/12/2023    Morbid obesity 12/07/2022    Peripheral polyneuropathy 11/14/2022    Prediabetes 09/22/2022    Lumbar radiculopathy 06/03/2022    Hot flashes due to menopause 05/11/2022    Dysuria 12/07/2021    Acute vaginitis 11/05/2021    Peripheral vascular disease 09/10/2021    Hypertension     GERD (gastroesophageal reflux disease)     Mild persistent asthma without complication 04/05/2021    SHERLYN on CPAP 04/05/2021    Obesity due to excess calories with serious comorbidity 04/05/2021    Multinodular goiter 04/16/2020    Fatty liver disease, nonalcoholic 12/17/2019    Thyroid nodule 12/01/2016     IMPRESSION AND PLAN: Primary osteoarthritis R knee. Personally reviewed previous  x-rays. Discussed all treatment options. Injection given today, see procedural note for details. Injection can be repeated in 3-4 months if needed. Otherwise follow up PRN.     No follow-ups on file.       So Smith PA-C      (Subject to voice recognition error, transcription service not allowed)           [1]   Current Outpatient Medications:     albuterol (PROVENTIL/VENTOLIN HFA) 90 mcg/actuation inhaler, Inhale 2 puffs into the lungs every 6 (six) hours as needed for Wheezing. Rescue, Disp: 18 g, Rfl: 5    budesonide-formoterol 160-4.5 mcg (SYMBICORT) 160-4.5 mcg/actuation HFAA, Inhale 2 puffs into the lungs every 12 (twelve) hours. Controller, Disp: 10.2 g, Rfl: 5    cetirizine (ZYRTEC) 10 MG tablet, Take 1 tablet (10 mg total) by mouth once daily., Disp: 90 tablet, Rfl: 1    cyclobenzaprine (FLEXERIL) 10 MG tablet, Take 10 mg by mouth 3 (three) times daily as needed for Muscle spasms., Disp: , Rfl:     famotidine (PEPCID) 20 MG tablet, Take 1 tablet (20 mg total) by mouth every evening. (Patient taking differently: Take 40 mg by mouth every evening. Reports taking two 20mg tabs at bedtime per ER at [place]), Disp: 90 tablet, Rfl: 1    HYDROcodone-acetaminophen (NORCO)  mg per tablet, Take 1 tablet by mouth 2 (two) times daily., Disp: , Rfl:     metFORMIN (GLUCOPHAGE) 500 MG tablet, Take 1 tablet (500 mg total) by mouth 2 (two) times daily with meals., Disp: 180 tablet, Rfl: 1    metoprolol succinate (TOPROL-XL) 25 MG 24 hr tablet, Take 1 tablet (25 mg total) by mouth once daily., Disp: 90 tablet, Rfl: 1    montelukast (SINGULAIR) 10 mg tablet, Take 1 tablet (10 mg total) by mouth every evening., Disp: 30 tablet, Rfl: 0    ondansetron (ZOFRAN-ODT) 4 MG TbDL, Take 2 tablets (8 mg total) by mouth every 6 (six) hours as needed (nausea)., Disp: 12 tablet, Rfl: 0    pantoprazole (PROTONIX) 40 MG tablet, Take 1 tablet (40 mg total) by mouth once daily., Disp: 90 tablet, Rfl: 1    simvastatin (ZOCOR) 40 MG  tablet, Take 1 tablet (40 mg total) by mouth every evening., Disp: 90 tablet, Rfl: 3    valsartan (DIOVAN) 160 MG tablet, Take 1 tablet (160 mg total) by mouth once daily., Disp: 90 tablet, Rfl: 3  [2]   Social History  Socioeconomic History    Marital status:     Number of children: 0   Tobacco Use    Smoking status: Never     Passive exposure: Never    Smokeless tobacco: Never   Substance and Sexual Activity    Alcohol use: Not Currently    Drug use: Never    Sexual activity: Yes     Social Drivers of Health     Financial Resource Strain: Low Risk  (7/12/2023)    Overall Financial Resource Strain (CARDIA)     Difficulty of Paying Living Expenses: Not very hard   Food Insecurity: Food Insecurity Present (7/12/2023)    Hunger Vital Sign     Worried About Running Out of Food in the Last Year: Sometimes true     Ran Out of Food in the Last Year: Sometimes true   Transportation Needs: No Transportation Needs (7/12/2023)    PRAPARE - Transportation     Lack of Transportation (Medical): No     Lack of Transportation (Non-Medical): No   Physical Activity: Inactive (7/12/2023)    Exercise Vital Sign     Days of Exercise per Week: 5 days     Minutes of Exercise per Session: 0 min   Stress: No Stress Concern Present (7/12/2023)    [employer] - Occupational Stress Questionnaire     Feeling of Stress : Only a little   Housing Stability: Low Risk  (7/12/2023)    Housing Stability Vital Sign     Unable to Pay for Housing in the Last Year: No     Number of Places Lived in the Last Year: 1     Unstable Housing in the Last Year: No

## 2025-07-02 RX ORDER — TRIAMCINOLONE ACETONIDE 40 MG/ML
40 INJECTION, SUSPENSION INTRA-ARTICULAR; INTRAMUSCULAR
Status: DISCONTINUED | OUTPATIENT
Start: 2025-06-23 | End: 2025-07-02 | Stop reason: HOSPADM

## 2025-07-02 RX ORDER — BUPIVACAINE HYDROCHLORIDE 2.5 MG/ML
1 INJECTION, SOLUTION EPIDURAL; INFILTRATION; INTRACAUDAL; PERINEURAL
Status: DISCONTINUED | OUTPATIENT
Start: 2025-06-23 | End: 2025-07-02 | Stop reason: HOSPADM

## 2025-07-16 ENCOUNTER — OFFICE VISIT (OUTPATIENT)
Dept: CARDIOLOGY | Facility: CLINIC | Age: 50
End: 2025-07-16
Payer: COMMERCIAL

## 2025-07-16 VITALS — HEART RATE: 81 BPM | OXYGEN SATURATION: 97 % | WEIGHT: 249 LBS | BODY MASS INDEX: 44.11 KG/M2

## 2025-07-16 DIAGNOSIS — G47.33 OSA ON CPAP: Chronic | ICD-10-CM

## 2025-07-16 DIAGNOSIS — F41.9 ANXIETY: ICD-10-CM

## 2025-07-16 DIAGNOSIS — K31.7 GASTRIC POLYPS: ICD-10-CM

## 2025-07-16 DIAGNOSIS — I73.9 PERIPHERAL VASCULAR DISEASE: Chronic | ICD-10-CM

## 2025-07-16 DIAGNOSIS — E66.01 MORBID OBESITY: ICD-10-CM

## 2025-07-16 DIAGNOSIS — E78.2 MIXED HYPERLIPIDEMIA: ICD-10-CM

## 2025-07-16 DIAGNOSIS — I10 PRIMARY HYPERTENSION: Chronic | ICD-10-CM

## 2025-07-16 DIAGNOSIS — J45.20 MILD INTERMITTENT ASTHMA WITHOUT COMPLICATION: ICD-10-CM

## 2025-07-16 DIAGNOSIS — R07.9 CHEST PAIN, UNSPECIFIED TYPE: Primary | ICD-10-CM

## 2025-07-16 DIAGNOSIS — E04.1 THYROID NODULE: ICD-10-CM

## 2025-07-16 DIAGNOSIS — E11.9 DIABETES MELLITUS WITHOUT COMPLICATION: ICD-10-CM

## 2025-07-16 PROCEDURE — 1159F MED LIST DOCD IN RCRD: CPT | Mod: CPTII,,, | Performed by: INTERNAL MEDICINE

## 2025-07-16 PROCEDURE — 99204 OFFICE O/P NEW MOD 45 MIN: CPT | Mod: S$PBB,,, | Performed by: INTERNAL MEDICINE

## 2025-07-16 PROCEDURE — 99215 OFFICE O/P EST HI 40 MIN: CPT | Mod: PBBFAC,25 | Performed by: INTERNAL MEDICINE

## 2025-07-16 PROCEDURE — 99999 PR PBB SHADOW E&M-EST. PATIENT-LVL V: CPT | Mod: PBBFAC,,, | Performed by: INTERNAL MEDICINE

## 2025-07-16 PROCEDURE — 4010F ACE/ARB THERAPY RXD/TAKEN: CPT | Mod: CPTII,,, | Performed by: INTERNAL MEDICINE

## 2025-07-16 PROCEDURE — 93010 ELECTROCARDIOGRAM REPORT: CPT | Mod: S$PBB,,, | Performed by: INTERNAL MEDICINE

## 2025-07-16 PROCEDURE — 3044F HG A1C LEVEL LT 7.0%: CPT | Mod: CPTII,,, | Performed by: INTERNAL MEDICINE

## 2025-07-16 PROCEDURE — 3008F BODY MASS INDEX DOCD: CPT | Mod: CPTII,,, | Performed by: INTERNAL MEDICINE

## 2025-07-16 PROCEDURE — 93005 ELECTROCARDIOGRAM TRACING: CPT | Mod: PBBFAC | Performed by: INTERNAL MEDICINE

## 2025-07-16 NOTE — PROGRESS NOTES
PCP: Kelli Paez FNP    Referring Provider:     Subjective:   Glendy Engle is a 50 y.o. female with hx of *** who presents for ***        Fhx:  Shx:     EKG - ***    ECHO - No results found for this or any previous visit.       CATH - No results found for this or any previous visit.       Stress - No results found for this or any previous visit.       Lab Results   Component Value Date     06/17/2025    K 3.5 06/17/2025     06/17/2025    CO2 31 (H) 06/17/2025    BUN 8 06/17/2025    CREATININE 0.75 06/17/2025    CALCIUM 10.0 06/17/2025    ANIONGAP 14 06/17/2025    EGFRNONAA 75 07/13/2021       Lab Results   Component Value Date    CHOL 146 11/19/2024    CHOL 142 08/08/2024    CHOL 135 11/15/2023     Lab Results   Component Value Date    HDL 50 11/19/2024    HDL 51 08/08/2024    HDL 59 11/15/2023     Lab Results   Component Value Date    LDLCALC 75 11/19/2024    LDLCALC 59 08/08/2024    LDLCALC 49 11/15/2023     Lab Results   Component Value Date    TRIG 103 11/19/2024    TRIG 159 (H) 08/08/2024    TRIG 136 11/15/2023     Lab Results   Component Value Date    CHOLHDL 2.9 11/19/2024    CHOLHDL 2.8 08/08/2024    CHOLHDL 2.3 11/15/2023       Lab Results   Component Value Date    WBC 7.42 06/17/2025    HGB 15.5 06/17/2025    HCT 46.6 06/17/2025    MCV 92.8 06/17/2025     (L) 06/17/2025         Current Medications[1]    ROS       Objective:   Pulse 81   Wt 112.9 kg (249 lb)   LMP 08/17/2007 (Approximate)   SpO2 97%   BMI 44.11 kg/m²       Physical Exam    Assessment:     1. Chest pain, unspecified type  EKG 12-lead    Ambulatory referral/consult to Cardiology    EKG 12-lead      2. Anxiety        3. Mild intermittent asthma without complication        4. Primary hypertension        5. Mixed hyperlipidemia        6. Peripheral vascular disease        7. Morbid obesity        8. Thyroid nodule        9. SHERLYN on CPAP        10. Gastric polyps              Plan:   No problem-specific Assessment &  Plan notes found for this encounter.                   [1]    Current Outpatient Medications:     albuterol (PROVENTIL/VENTOLIN HFA) 90 mcg/actuation inhaler, Inhale 2 puffs into the lungs every 6 (six) hours as needed for Wheezing. Rescue, Disp: 18 g, Rfl: 5    budesonide-formoterol 160-4.5 mcg (SYMBICORT) 160-4.5 mcg/actuation HFAA, Inhale 2 puffs into the lungs every 12 (twelve) hours. Controller, Disp: 10.2 g, Rfl: 5    cetirizine (ZYRTEC) 10 MG tablet, Take 1 tablet (10 mg total) by mouth once daily., Disp: 90 tablet, Rfl: 1    cyclobenzaprine (FLEXERIL) 10 MG tablet, Take 10 mg by mouth 3 (three) times daily as needed for Muscle spasms., Disp: , Rfl:     famotidine (PEPCID) 20 MG tablet, Take 1 tablet (20 mg total) by mouth every evening. (Patient taking differently: Take 40 mg by mouth every evening. Reports taking two 20mg tabs at bedtime per ER at [place]), Disp: 90 tablet, Rfl: 1    HYDROcodone-acetaminophen (NORCO)  mg per tablet, Take 1 tablet by mouth 2 (two) times daily., Disp: , Rfl:     metFORMIN (GLUCOPHAGE) 500 MG tablet, Take 1 tablet (500 mg total) by mouth 2 (two) times daily with meals., Disp: 180 tablet, Rfl: 1    metoprolol succinate (TOPROL-XL) 25 MG 24 hr tablet, Take 1 tablet (25 mg total) by mouth once daily., Disp: 90 tablet, Rfl: 1    ondansetron (ZOFRAN-ODT) 4 MG TbDL, Take 2 tablets (8 mg total) by mouth every 6 (six) hours as needed (nausea)., Disp: 12 tablet, Rfl: 0    pantoprazole (PROTONIX) 40 MG tablet, Take 1 tablet (40 mg total) by mouth once daily., Disp: 90 tablet, Rfl: 1    simvastatin (ZOCOR) 40 MG tablet, Take 1 tablet (40 mg total) by mouth every evening., Disp: 90 tablet, Rfl: 3    valsartan (DIOVAN) 160 MG tablet, Take 1 tablet (160 mg total) by mouth once daily., Disp: 90 tablet, Rfl: 3   (10 mg total) by mouth once daily., Disp: 90 tablet, Rfl: 1    cyclobenzaprine (FLEXERIL) 10 MG tablet, Take 10 mg by mouth 3 (three) times daily as needed for Muscle spasms., Disp: , Rfl:     famotidine (PEPCID) 20 MG tablet, Take 1 tablet (20 mg total) by mouth every evening. (Patient taking differently: Take 40 mg by mouth every evening. Reports taking two 20mg tabs at bedtime per ER at [place]), Disp: 90 tablet, Rfl: 1    HYDROcodone-acetaminophen (NORCO)  mg per tablet, Take 1 tablet by mouth 2 (two) times daily., Disp: , Rfl:     metFORMIN (GLUCOPHAGE) 500 MG tablet, Take 1 tablet (500 mg total) by mouth 2 (two) times daily with meals., Disp: 180 tablet, Rfl: 1    metoprolol succinate (TOPROL-XL) 25 MG 24 hr tablet, Take 1 tablet (25 mg total) by mouth once daily., Disp: 90 tablet, Rfl: 1    ondansetron (ZOFRAN-ODT) 4 MG TbDL, Take 2 tablets (8 mg total) by mouth every 6 (six) hours as needed (nausea)., Disp: 12 tablet, Rfl: 0    pantoprazole (PROTONIX) 40 MG tablet, Take 1 tablet (40 mg total) by mouth once daily., Disp: 90 tablet, Rfl: 1    simvastatin (ZOCOR) 40 MG tablet, Take 1 tablet (40 mg total) by mouth every evening., Disp: 90 tablet, Rfl: 3    valsartan (DIOVAN) 160 MG tablet, Take 1 tablet (160 mg total) by mouth once daily., Disp: 90 tablet, Rfl:     Review of Systems   Constitutional: Negative for diaphoresis, malaise/fatigue, night sweats and weight gain.   HENT:  Negative for congestion, ear pain, hearing loss, nosebleeds and sore throat.    Eyes:  Negative for blurred vision, double vision, pain, photophobia and visual disturbance.   Cardiovascular:  Positive for chest pain, dyspnea on exertion and palpitations. Negative for claudication, irregular heartbeat, leg swelling, near-syncope, orthopnea and syncope.   Respiratory:  Positive for shortness of breath. Negative for cough, sleep disturbances due to breathing, snoring and wheezing.         SHERLYN, reports is compliant with CPAP    Endocrine: Negative for cold intolerance, heat intolerance, polydipsia, polyphagia and polyuria.   Hematologic/Lymphatic: Negative for bleeding problem. Does not bruise/bleed easily.   Skin:  Negative for dry skin, flushing, itching, rash and skin cancer.   Musculoskeletal:  Negative for arthritis, back pain, falls, joint pain, muscle cramps, muscle weakness and myalgias.   Gastrointestinal:  Positive for heartburn. Negative for abdominal pain, change in bowel habit, constipation, diarrhea, dysphagia, nausea and vomiting.   Genitourinary:  Negative for bladder incontinence, dysuria, flank pain, frequency and nocturia.   Neurological:  Negative for dizziness, focal weakness, headaches, light-headedness, loss of balance, numbness, paresthesias and seizures.   Psychiatric/Behavioral:  Negative for depression, memory loss and substance abuse. The patient is not nervous/anxious.    Allergic/Immunologic: Negative for environmental allergies.          Objective:   Pulse 81   Wt 112.9 kg (249 lb)   LMP 08/17/2007 (Approximate)   SpO2 97%   BMI 44.11 kg/m²       Physical Exam  Vitals and nursing note reviewed.   Constitutional:       Appearance: Normal appearance. She is obese.   HENT:      Head: Normocephalic and atraumatic.      Right Ear: External ear normal.      Left Ear: External ear normal.   Eyes:      General: No scleral icterus.        Right eye: No discharge.         Left eye: No discharge.      Extraocular Movements: Extraocular movements intact.      Conjunctiva/sclera: Conjunctivae normal.      Pupils: Pupils are equal, round, and reactive to light.   Cardiovascular:      Rate and Rhythm: Normal rate and regular rhythm.      Pulses: Normal pulses.      Heart sounds: Normal heart sounds. No murmur heard.     No friction rub. No gallop.   Pulmonary:      Effort: Pulmonary effort is normal.      Breath sounds: Normal breath sounds. No wheezing, rhonchi or rales.   Chest:      Chest wall: No tenderness.    Abdominal:      General: Abdomen is flat. Bowel sounds are normal. There is no distension.      Palpations: Abdomen is soft.      Tenderness: There is abdominal tenderness. There is no guarding or rebound.      Comments: Mlld epigastric tenderness.    Musculoskeletal:         General: No swelling or tenderness. Normal range of motion.      Cervical back: Normal range of motion and neck supple.   Skin:     General: Skin is warm and dry.      Findings: No erythema or rash.   Neurological:      General: No focal deficit present.      Mental Status: She is alert and oriented to person, place, and time.      Cranial Nerves: No cranial nerve deficit.      Motor: No weakness.      Gait: Gait normal.   Psychiatric:         Mood and Affect: Mood normal.         Behavior: Behavior normal.         Thought Content: Thought content normal.         Judgment: Judgment normal.       Assessment:     1. Chest pain, unspecified type  EKG 12-lead    Ambulatory referral/consult to Cardiology    EKG 12-lead    NM Myocardial Perfusion Spect Multi Exer    Nuclear Stress Test    Echo    Atypical chest pain, different from GERD symptoms, multiple risk factors, abnormal EKG, will order treadmill cardiolyte stress test to evaluate for ischemia.      2. Mild intermittent asthma without complication      well controlled on MDIs.      3. Primary hypertension      appears controlled on current meds.      4. Mixed hyperlipidemia      reports is compliant with dietary restrictions, if following with primary care, reports is at goal on Zocor.      5. Morbid obesity      Unsuccessful wt loss, discussed lifestyle changes, will recommend execise RX pending results of echo and stress imaging.      6. SHERLYN on CPAP      reports she is compliant with CPAP      7. Diabetes mellitus without complication      complaint with meds, is not following dietary recs, discussed at length.            Plan:   Recommend provocative stress imaging, echo, add ASA 81 mg q  d, follow up to review results of cardiac imaging.

## 2025-07-17 LAB
OHS QRS DURATION: 78 MS
OHS QTC CALCULATION: 405 MS

## 2025-07-28 PROBLEM — R07.9 CHEST PAIN: Status: ACTIVE | Noted: 2025-07-28

## 2025-08-11 PROBLEM — R07.89 ANTERIOR CHEST WALL PAIN: Status: RESOLVED | Noted: 2025-06-24 | Resolved: 2025-08-11

## 2025-08-15 ENCOUNTER — OFFICE VISIT (OUTPATIENT)
Dept: FAMILY MEDICINE | Facility: CLINIC | Age: 50
End: 2025-08-15
Payer: COMMERCIAL

## 2025-08-15 VITALS
OXYGEN SATURATION: 96 % | HEIGHT: 63 IN | RESPIRATION RATE: 18 BRPM | BODY MASS INDEX: 43.88 KG/M2 | HEART RATE: 98 BPM | TEMPERATURE: 99 F | DIASTOLIC BLOOD PRESSURE: 80 MMHG | SYSTOLIC BLOOD PRESSURE: 132 MMHG | WEIGHT: 247.63 LBS

## 2025-08-15 DIAGNOSIS — K76.0 FATTY LIVER DISEASE, NONALCOHOLIC: Primary | ICD-10-CM

## 2025-08-15 DIAGNOSIS — I10 PRIMARY HYPERTENSION: ICD-10-CM

## 2025-08-15 DIAGNOSIS — E11.9 DIABETES MELLITUS WITHOUT COMPLICATION: Chronic | ICD-10-CM

## 2025-08-15 RX ORDER — PAROXETINE 20 MG/1
20 TABLET, FILM COATED ORAL DAILY
COMMUNITY
End: 2025-08-15

## 2025-08-15 RX ORDER — METFORMIN HYDROCHLORIDE 500 MG/1
500 TABLET ORAL 2 TIMES DAILY WITH MEALS
Qty: 180 TABLET | Refills: 1 | Status: SHIPPED | OUTPATIENT
Start: 2025-08-15

## 2025-08-15 RX ORDER — BUDESONIDE AND FORMOTEROL FUMARATE DIHYDRATE 160; 4.5 UG/1; UG/1
2 AEROSOL RESPIRATORY (INHALATION) EVERY 12 HOURS
COMMUNITY